# Patient Record
Sex: FEMALE | Race: ASIAN | NOT HISPANIC OR LATINO | Employment: UNEMPLOYED | URBAN - METROPOLITAN AREA
[De-identification: names, ages, dates, MRNs, and addresses within clinical notes are randomized per-mention and may not be internally consistent; named-entity substitution may affect disease eponyms.]

---

## 2018-07-09 ENCOUNTER — LAB (OUTPATIENT)
Dept: LAB | Facility: HOSPITAL | Age: 53
End: 2018-07-09

## 2018-07-09 ENCOUNTER — OFFICE VISIT (OUTPATIENT)
Dept: FAMILY MEDICINE CLINIC | Facility: CLINIC | Age: 53
End: 2018-07-09

## 2018-07-09 VITALS
HEIGHT: 62 IN | WEIGHT: 150 LBS | BODY MASS INDEX: 27.6 KG/M2 | TEMPERATURE: 98.3 F | OXYGEN SATURATION: 99 % | DIASTOLIC BLOOD PRESSURE: 104 MMHG | SYSTOLIC BLOOD PRESSURE: 176 MMHG | HEART RATE: 82 BPM

## 2018-07-09 DIAGNOSIS — J30.89 CHRONIC NON-SEASONAL ALLERGIC RHINITIS, UNSPECIFIED TRIGGER: ICD-10-CM

## 2018-07-09 DIAGNOSIS — N92.6 MENSTRUAL IRREGULARITY: ICD-10-CM

## 2018-07-09 DIAGNOSIS — I10 ESSENTIAL HYPERTENSION: ICD-10-CM

## 2018-07-09 DIAGNOSIS — Z00.00 GENERAL MEDICAL EXAM: Primary | ICD-10-CM

## 2018-07-09 LAB
ANION GAP SERPL CALCULATED.3IONS-SCNC: 9 MMOL/L (ref 3–11)
BASOPHILS # BLD AUTO: 0.04 10*3/MM3 (ref 0–0.2)
BASOPHILS NFR BLD AUTO: 0.5 % (ref 0–1)
BUN BLD-MCNC: 13 MG/DL (ref 9–23)
BUN/CREAT SERPL: 13.1 (ref 7–25)
CALCIUM SPEC-SCNC: 9.2 MG/DL (ref 8.7–10.4)
CHLORIDE SERPL-SCNC: 104 MMOL/L (ref 99–109)
CO2 SERPL-SCNC: 28 MMOL/L (ref 20–31)
CREAT BLD-MCNC: 0.99 MG/DL (ref 0.6–1.3)
DEPRECATED RDW RBC AUTO: 39 FL (ref 37–54)
EOSINOPHIL # BLD AUTO: 0.34 10*3/MM3 (ref 0–0.3)
EOSINOPHIL NFR BLD AUTO: 4.4 % (ref 0–3)
ERYTHROCYTE [DISTWIDTH] IN BLOOD BY AUTOMATED COUNT: 11.9 % (ref 11.3–14.5)
GFR SERPL CREATININE-BSD FRML MDRD: 59 ML/MIN/1.73
GFR SERPL CREATININE-BSD FRML MDRD: 71 ML/MIN/1.73
GLUCOSE BLD-MCNC: 167 MG/DL (ref 70–100)
HCT VFR BLD AUTO: 41.1 % (ref 34.5–44)
HGB BLD-MCNC: 13.5 G/DL (ref 11.5–15.5)
IMM GRANULOCYTES # BLD: 0.01 10*3/MM3 (ref 0–0.03)
IMM GRANULOCYTES NFR BLD: 0.1 % (ref 0–0.6)
IRON 24H UR-MRATE: 72 MCG/DL (ref 50–175)
LYMPHOCYTES # BLD AUTO: 3 10*3/MM3 (ref 0.6–4.8)
LYMPHOCYTES NFR BLD AUTO: 38.8 % (ref 24–44)
MCH RBC QN AUTO: 29.5 PG (ref 27–31)
MCHC RBC AUTO-ENTMCNC: 32.8 G/DL (ref 32–36)
MCV RBC AUTO: 89.7 FL (ref 80–99)
MONOCYTES # BLD AUTO: 0.51 10*3/MM3 (ref 0–1)
MONOCYTES NFR BLD AUTO: 6.6 % (ref 0–12)
NEUTROPHILS # BLD AUTO: 3.84 10*3/MM3 (ref 1.5–8.3)
NEUTROPHILS NFR BLD AUTO: 49.6 % (ref 41–71)
PLATELET # BLD AUTO: 298 10*3/MM3 (ref 150–450)
PMV BLD AUTO: 10.1 FL (ref 6–12)
POTASSIUM BLD-SCNC: 3.8 MMOL/L (ref 3.5–5.5)
RBC # BLD AUTO: 4.58 10*6/MM3 (ref 3.89–5.14)
SODIUM BLD-SCNC: 141 MMOL/L (ref 132–146)
WBC NRBC COR # BLD: 7.74 10*3/MM3 (ref 3.5–10.8)

## 2018-07-09 PROCEDURE — 99386 PREV VISIT NEW AGE 40-64: CPT | Performed by: PHYSICIAN ASSISTANT

## 2018-07-09 PROCEDURE — 83540 ASSAY OF IRON: CPT

## 2018-07-09 PROCEDURE — 85025 COMPLETE CBC W/AUTO DIFF WBC: CPT

## 2018-07-09 PROCEDURE — 80048 BASIC METABOLIC PNL TOTAL CA: CPT

## 2018-07-09 PROCEDURE — 93000 ELECTROCARDIOGRAM COMPLETE: CPT | Performed by: PHYSICIAN ASSISTANT

## 2018-07-09 PROCEDURE — 36415 COLL VENOUS BLD VENIPUNCTURE: CPT

## 2018-07-09 RX ORDER — ATORVASTATIN CALCIUM 20 MG/1
TABLET, FILM COATED ORAL
COMMUNITY
Start: 2018-07-01 | End: 2021-10-23 | Stop reason: HOSPADM

## 2018-07-09 RX ORDER — AMLODIPINE BESYLATE 10 MG/1
10 TABLET ORAL DAILY
Qty: 30 TABLET | Refills: 5 | Status: SHIPPED | OUTPATIENT
Start: 2018-07-09 | End: 2021-10-29 | Stop reason: SDUPTHER

## 2018-07-09 RX ORDER — AMLODIPINE BESYLATE 10 MG/1
TABLET ORAL
COMMUNITY
Start: 2018-06-04 | End: 2018-07-09 | Stop reason: SDUPTHER

## 2018-07-09 RX ORDER — AMOXICILLIN AND CLAVULANATE POTASSIUM 875; 125 MG/1; MG/1
TABLET, FILM COATED ORAL
Refills: 0 | COMMUNITY
Start: 2018-05-08 | End: 2018-07-09

## 2018-07-09 RX ORDER — MEDROXYPROGESTERONE ACETATE 10 MG/1
10 TABLET ORAL DAILY
COMMUNITY
End: 2018-08-13

## 2018-07-09 RX ORDER — AMOXICILLIN 500 MG/1
1000 CAPSULE ORAL 2 TIMES DAILY
COMMUNITY
End: 2018-07-09

## 2018-07-09 RX ORDER — LORATADINE 10 MG/1
10 TABLET ORAL DAILY
Qty: 30 TABLET | Refills: 5 | Status: SHIPPED | OUTPATIENT
Start: 2018-07-09

## 2018-07-09 NOTE — PROGRESS NOTES
Subjective   Shital Guzmán is a 53 y.o. female  Establish Care (New patient establish care ); Hypertension (blood pressure concerns ); and Annual Exam      History of Present Illness  Patient presents today as a new patient to our practice to establish care and for a preventive medical visit.  Patient is here to determine screening labs and tests that are due and to determine immunization status as well.  She states her previous doctor was Dr. Faraz Blanton at [facility].  Patient will be counseled regarding preventative medicine issues such as regular exercise and  healthy diet as well.  Patient states that she ran out of blood pressure medication 1 week ago.    The following portions of the patient's history were reviewed and updated as appropriate: allergies, current medications, past social history and problem list    Review of Systems   Constitutional: Positive for fatigue.   HENT: Positive for congestion ( Clear), postnasal drip, rhinorrhea and sneezing. Negative for sinus pain, sinus pressure, sore throat and voice change.    Eyes: Negative.    Respiratory: Negative.    Cardiovascular: Negative.    Gastrointestinal: Negative.    Endocrine: Negative.    Genitourinary: Positive for menstrual problem ( -Patient states that she has a lot of problems with heavy menstrual bleeding.).   Musculoskeletal: Negative.    Skin: Negative.    Allergic/Immunologic: Negative.    Neurological: Negative.    Hematological: Negative.    Psychiatric/Behavioral: Positive for dysphoric mood ( She states she has felt a little bit low mood lately since losing her job recently but doesn't think she needs medication.  She is not homicidal or suicidal.). Negative for hallucinations, self-injury and suicidal ideas.   All other systems reviewed and are negative.      Objective     Vitals:    07/09/18 1324   BP: (!) 176/104   Pulse: 82   Temp: 98.3 °F (36.8 °C)   SpO2: 99%       Physical Exam   Constitutional: She is oriented to person, place,  and time. She appears well-developed and well-nourished.   HENT:   Head: Normocephalic and atraumatic.   Right Ear: External ear normal.   Left Ear: External ear normal.   Nose: Nose normal.   Mouth/Throat: Oropharynx is clear and moist.   Eyes: Conjunctivae and EOM are normal. Pupils are equal, round, and reactive to light.   Neck: Normal range of motion. Neck supple. No JVD present. Carotid bruit is not present. No thyromegaly present.   Cardiovascular: Normal rate, regular rhythm, normal heart sounds and intact distal pulses.    No murmur heard.  Pulmonary/Chest: Effort normal and breath sounds normal.   Abdominal: Soft. Bowel sounds are normal. She exhibits no mass. There is no tenderness.   Musculoskeletal: Normal range of motion. She exhibits no edema.   Lymphadenopathy:     She has no cervical adenopathy.   Neurological: She is alert and oriented to person, place, and time. She has normal reflexes. No cranial nerve deficit.   Skin: Skin is warm and dry.   Psychiatric: She has a normal mood and affect. Her behavior is normal. Judgment and thought content normal. Her speech is rapid and/or pressured ( Very fast almost continuous speech throughout office visit.). She is not actively hallucinating. Cognition and memory are normal. She is attentive.   Nursing note and vitals reviewed.    ECG 12 Lead  Date/Time: 7/9/2018 4:38 PM  Performed by: CAREY AGRAWAL  Authorized by: CAREY AGRAWAL   Comparison: not compared with previous ECG   Previous ECG: no previous ECG available  Rhythm: sinus rhythm  Rate: normal  BPM: 80  Conduction: conduction normal  ST Segments: ST segments normal  T Waves: T waves normal  QRS axis: normal  Other: no other findings  Clinical impression: normal ECG          Discussed preventative medicine issues with patient including regular exercise, healthy diet, stress reduction, adequate sleep and recommended age-appropriate screening studies.  Assessment/Plan     Diagnoses and all  orders for this visit:    General medical exam    Menstrual irregularity  -     Ambulatory Referral to Gynecology  -     CBC & Differential; Future  -     Iron; Future    Essential hypertension  -     Discontinue: amLODIPine (NORVASC) 10 MG tablet;   -     amLODIPine (NORVASC) 10 MG tablet; Take 1 tablet by mouth Daily.  -     Basic Metabolic Panel; Future  -     ECG 12 Lead    Chronic non-seasonal allergic rhinitis, unspecified trigger  -     loratadine (CLARITIN) 10 MG tablet; Take 1 tablet by mouth Daily.    Other orders  -     atorvastatin (LIPITOR) 20 MG tablet; 1 daily  -     Discontinue: amoxicillin-clavulanate (AUGMENTIN) 875-125 MG per tablet; TAKE 1 TABLET BY MOUTH TWO TIMES A DAY FOR 10 DAYS  -     medroxyPROGESTERone (PROVERA) 10 MG tablet; Take 10 mg by mouth Daily.  -     Discontinue: amoxicillin (AMOXIL) 500 MG capsule; Take 1,000 mg by mouth 2 (Two) Times a Day.      Discussed with patient importance of taking her blood pressure medication daily, she'll follow-up for recheck of blood pressure on medication in 3 weeks.

## 2018-08-10 PROBLEM — Z01.419 WELL WOMAN EXAM WITH ROUTINE GYNECOLOGICAL EXAM: Status: ACTIVE | Noted: 2018-08-10

## 2018-08-13 ENCOUNTER — TELEPHONE (OUTPATIENT)
Dept: FAMILY MEDICINE CLINIC | Facility: CLINIC | Age: 53
End: 2018-08-13

## 2018-08-13 ENCOUNTER — OFFICE VISIT (OUTPATIENT)
Dept: OBSTETRICS AND GYNECOLOGY | Facility: CLINIC | Age: 53
End: 2018-08-13

## 2018-08-13 VITALS
BODY MASS INDEX: 27.98 KG/M2 | DIASTOLIC BLOOD PRESSURE: 90 MMHG | WEIGHT: 153 LBS | RESPIRATION RATE: 14 BRPM | SYSTOLIC BLOOD PRESSURE: 152 MMHG

## 2018-08-13 DIAGNOSIS — Z01.419 WELL WOMAN EXAM WITH ROUTINE GYNECOLOGICAL EXAM: Primary | ICD-10-CM

## 2018-08-13 DIAGNOSIS — N92.1 METRORRHAGIA: ICD-10-CM

## 2018-08-13 PROCEDURE — 99386 PREV VISIT NEW AGE 40-64: CPT | Performed by: OBSTETRICS & GYNECOLOGY

## 2018-08-13 NOTE — TELEPHONE ENCOUNTER
----- Message from Nenita Deng sent at 8/13/2018  4:22 PM EDT -----  Contact: PT  REQUESTS CALL FROM NURSE/MA ABOUT RECENT LABS AND WHETHER SHE NEEDS TO COME IN AND FOLLOW UP OR NOT. [phone]

## 2018-08-13 NOTE — PROGRESS NOTES
Subjective   Chief Complaint   Patient presents with   • Establish Care     Shital Guzmán is a 53 y.o. year old .  Patient's last menstrual period was 2018 (approximate).  She presents to be seen because of troubles with her cycles.  She's had issues with this for many years.  She has a reported history of fibroids.  She last saw somebody at the end of .  Pap smear was done and was normal.  She tells me she's had a history of fibroids diagnosed by ultrasound.  She is uncertain exactly where that was done.  No records are inside the computer for review regarding ultrasounds.    She has been both at [facility] in [place] for a similar problem in the past year he does records not available for review.    Her cycles can be heavy again they are unpredictable.  It is been this way for more than 12 months.  She has had a history of anemia.  She did have recent labs showing her hemoglobin was normal    OTHER THINGS SHE WANTS TO DISCUSS TODAY:  Nothing else    The following portions of the patient's history were reviewed and updated as appropriate:current medications, allergies, past medical history, past social history and past surgical history    Smoking status: Never Smoker                                                                 Smokeless tobacco: Not on file                       Review of Systems  Constitutional POS: nothing reported    NEG: anorexia or night sweats   Genitourinary POS: nothing reported    NEG: dysuria or hematuria   Gastointestinal POS: nothing reported and had colonoscopy in the past 5 year - results are not in record for review    NEG: bloating, change in bowel habits, melena or reflux symptoms   Integument POS: nothing reported    NEG: moles that are changing in size, shape, color or rashes   Breast POS: nothing reported    NEG: persistent breast lump, skin dimpling or nipple discharge         Objective   /90   Resp 14   Wt 69.4 kg (153 lb)   LMP  07/02/2018 (Approximate)   Breastfeeding? No   BMI 27.98 kg/m²     General:  well developed; well nourished  no acute distress   Skin:  No suspicious lesions seen   Thyroid: normal to inspection and palpation   Lungs:  breathing is unlabored   Heart:  Not performed.   Breasts:  Examined in supine position  Symmetric without masses or skin dimpling  Nipples normal without inversion, lesions or discharge  There are no palpable axillary nodes   Abdomen: soft, non-tender; no masses  no umbilical or inginual hernias are present  no hepato-splenomegaly   Pelvis: Clinical staff was present for exam  External genitalia:  normal appearance of the external genitalia including Bartholin's and Murillo's glands.  :  urethral meatus normal;  Vaginal:  normal pink mucosa without prolapse or lesions.  Cervix:  normal appearance.  Uterus:  normal size, shape and consistency.  Adnexa:  non palpable bilaterally.  Rectal:  digital rectal exam not performed; anus visually normal appearing.     Lab Review   No data reviewed    Imaging   No data reviewed        Assessment   1. Normal GYN exam  2. Metrorrhagia - This is a new finding that does need to be worked up further  3. She is up to date on all relevant gynecologic and colorectal screenings except mammography     Plan   1. Pap was done today.  If she does not receive the results of the Pap within 2 weeks  time, she was instructed to call to find out the results.  I explained to Shital that the recommendations for Pap smear interval in a low risk patient's has lengthened to 3 years time.  I encouraged her to be seen yearly for a full physical exam including breast and pelvic exam even during the off years when PAP's will not be performed.  2. She was encouraged to get yearly mammograms.  She should report any palpable breast lump(s) or skin changes regardless of mammographic findings.  I explained to Shital that notification regarding her mammogram results will come from the center  performing the study.  Our office will not be routinely calling with mammogram results.  It is her responsibility to make sure that the results from the mammogram are communicated to her by the breast center.  If she has any questions about the results, she is welcome to call our office anytime. The phone number to schedule a mammogram will be given to Shital today at the time of check out.  I explained that she should be able to call the center directly to schedule her screening mammogram without a physician's order.  So long as she gives them our name, a copy of the mammogram report should be sent to us for review.  3. Ultrasound needs to be done any time that is convenient for her.   4. The importance of keeping all planned follow-up and taking all medications as prescribed was emphasized.  5. Follow up after ultrasound            This note was electronically signed.    Herve Weiss M.D.  August 13, 2018    Note: Speech recognition transcription software may have been used to create portions of this document.  An attempt at proofreading has been made but errors in transcription could still be present.

## 2018-08-15 ENCOUNTER — TELEPHONE (OUTPATIENT)
Dept: OBSTETRICS AND GYNECOLOGY | Facility: CLINIC | Age: 53
End: 2018-08-15

## 2018-08-15 DIAGNOSIS — N95.0 POST-MENOPAUSAL BLEEDING: Primary | ICD-10-CM

## 2018-08-15 NOTE — TELEPHONE ENCOUNTER
Please call Shital and let her know the ultrasound was inconclusive for the reasons why she is bleeding.  I like to get her set up for a saline infusion sonogram to look into this further.  Orders for the saline infusion sonogram have been placed.

## 2018-09-10 ENCOUNTER — OFFICE VISIT (OUTPATIENT)
Dept: OBSTETRICS AND GYNECOLOGY | Facility: CLINIC | Age: 53
End: 2018-09-10

## 2018-09-10 DIAGNOSIS — N92.4 EXCESSIVE BLEEDING IN PREMENOPAUSAL PERIOD: Primary | ICD-10-CM

## 2018-09-10 PROCEDURE — 58340 CATHETER FOR HYSTEROGRAPHY: CPT | Performed by: OBSTETRICS & GYNECOLOGY

## 2018-09-10 NOTE — PROGRESS NOTES
Saline infusion ultrasound     Date of procedure:  September 10, 2018     Risks and benefits discussed? yes  All questions answered? yes  Consents given by the patient  Written consent obtained? yes     Pre-op indication: Menorrhagia     Procedure documentation:  After the patient was identified and informed consent given she was placed in dorsal lithotomy position in stirrups and draped. A sterile speculum was placed inside the vagina with good visualization of the cervix and the cervix was cleaned with Betadine swab ×3. Lidocaine ointment was applied to the anterior lip of the cervix and the cervix was grasped with a single-tooth tenaculum. A balloon catheter was introduced through the cervix without complication and filled with air. The speculum and tenaculum were removed. The uterine cavity was then evaluated with transvaginal ultrasonography. The endometrial stripe was found to be homogenous and approximately 0.6 cm in thickness. There were no signs of submucosal fibroids. The cavity was then filled with approximately 10 mL of normal saline and evaluated on ultrasound. There were signs of pathology (polyp) visualized. The cavity was then drained of saline and the balloon was released and the catheter was removed. No bleeding was noted on the cervical lip and the procedure was completed. The patient tolerated the procedure well and was given follow-up instructions.             Ultrasound Impression: 1.  Possible endometrial polyp          Plan:     1.  Await formal read and evaluation by Dr. Weiss for patient's final plan of care.  Patient voiced understanding and agrees        This note was electronically signed.     Lisandro Sandoval M.D. RAY.  September 10, 2018

## 2018-09-11 ENCOUNTER — TELEPHONE (OUTPATIENT)
Dept: OBSTETRICS AND GYNECOLOGY | Facility: CLINIC | Age: 53
End: 2018-09-11

## 2018-09-11 NOTE — TELEPHONE ENCOUNTER
Called mobile phone in attempt to discuss recent saline infusion sonogram.  Mobile phone ([phone]) with mailbox that was full and could not leave a message.  Then called the home number listed ([phone]).  A gentleman answering the phone and said she no longer had this phone and did not know her current phone number  He would pass a message to her next time he saw her that I had called.  Then called her emergency contact ([phone]) in an effort to reach her.  Left a message on his machine that I was trying to reach his mother about test results and did not have an up-to-date contact phone number with which to reach her

## 2018-09-12 ENCOUNTER — PREP FOR SURGERY (OUTPATIENT)
Dept: OTHER | Facility: HOSPITAL | Age: 53
End: 2018-09-12

## 2018-09-12 ENCOUNTER — TELEPHONE (OUTPATIENT)
Dept: OBSTETRICS AND GYNECOLOGY | Facility: CLINIC | Age: 53
End: 2018-09-12

## 2018-09-12 DIAGNOSIS — N92.1 METRORRHAGIA: Primary | ICD-10-CM

## 2018-09-12 DIAGNOSIS — D25.1 INTRAMURAL AND SUBSEROUS LEIOMYOMA OF UTERUS: ICD-10-CM

## 2018-09-12 DIAGNOSIS — D25.2 INTRAMURAL AND SUBSEROUS LEIOMYOMA OF UTERUS: ICD-10-CM

## 2018-09-12 NOTE — TELEPHONE ENCOUNTER
Called pt to schedule out pt Hysteroscopy surgery and she states she doesn't want the IUD and she wants her uterus removed, confirmed she wanted a Hysterectomy and not hysteroscopy.  She replied Yes.

## 2018-09-12 NOTE — TELEPHONE ENCOUNTER
First step in the process has to be a D&C.  Have to make sure there is no cancer or precancer as the source.

## 2018-09-12 NOTE — TELEPHONE ENCOUNTER
Returned her call and left message that I will try back later today.      Herve Weiss M.D.  September 12, 2018  12:44 PM

## 2018-09-12 NOTE — TELEPHONE ENCOUNTER
She return my call.  Explained that the saline infusion sonogram cannot exclude intracavitary disease such as polyps.  My suspicion is that polyps are not present but I cannot definitively say that.  We need to get her set up for hysteroscopy with D&C with possible Myosure.  Assuming polyps or absent and pathology is all benign, she may be an excellent candidate for either an IUD or a low-dose birth control pill (assuming there are no contraindications)

## 2018-09-16 ENCOUNTER — PREP FOR SURGERY (OUTPATIENT)
Dept: OTHER | Facility: HOSPITAL | Age: 53
End: 2018-09-16

## 2018-09-16 NOTE — H&P
Shital Guzmán  : 1965  MRN: [id number]  [facility]: [id number]    History and Physical    Subjective   Shital Guzmán is a 53 y.o. year old  who present for surgery due to a long-standing history of heavy unpredictable cycles with the saline infusion sonogram with a thickened endometrium and possible endometrial polyp.  Standard transvaginal scanning shows uterine fibroids involving the myometrium and subserosal area.  There is no extension into the cavity.  She had a normal Pap smear in 2018.    Past Medical History:   Diagnosis Date   • Anemia    • Colon polyp    • Essential hypertension    • GERD (gastroesophageal reflux disease)    • H/O gonorrhea 10/2016   • High cholesterol    • Uterine fibroid      Past Surgical History:   Procedure Laterality Date   • LAPAROSCOPIC APPENDECTOMY     • LAPAROSCOPIC TUBAL LIGATION       Obstetric History       T2      L2     SAB0   TAB1   Ectopic0   Molar0   Multiple0   Live Births2       # Outcome Date GA Lbr Todd/2nd Weight Sex Delivery Anes PTL Lv   3 TAB            2 Term     M Vag-Spont   LADI   1 Term     M Vag-Spont   LADI      Obstetric Comments    - Sae    - Pavan     Smoking status: Never Smoker                                                                 Smokeless tobacco: Not on file                         Current Outpatient Prescriptions:   •  amLODIPine (NORVASC) 10 MG tablet, Take 1 tablet by mouth Daily., Disp: 30 tablet, Rfl: 5  •  atorvastatin (LIPITOR) 20 MG tablet, 1 daily, Disp: , Rfl:   •  loratadine (CLARITIN) 10 MG tablet, Take 1 tablet by mouth Daily., Disp: 30 tablet, Rfl: 5    Allergies   Allergen Reactions   • Carvedilol Hives   • Cozaar [Losartan] Hives   • Hydrochlorothiazide Hives   • Hyzaar [Losartan Potassium-Hctz] Hives       Review of Systems      Objective     Vital Signs: See nursing documentation   General: well developed; well nourished  no acute distress   Mental  status: Alert and oriented   Heart: Not performed.   Lungs: breathing is unlabored   Abdomen: soft, non-tender; no masses  no umbilical or inginual hernias are present  no hepato-splenomegaly   Pelvis: Clinical staff was present for exam  External genitalia:  normal appearance of the external genitalia including Bartholin's and Crest's glands.  :  urethral meatus normal;  Vaginal:  normal pink mucosa without prolapse or lesions.  Cervix:  normal appearance.  Uterus:  normal size, shape and consistency.  Adnexa:  normal bimanual exam of the adnexa.  Rectal:  digital rectal exam not performed; anus visually normal appearing.   Labs  Lab Results   Component Value Date     07/09/2018    HGB 13.5 07/09/2018    HCT 41.1 07/09/2018    WBC 7.74 07/09/2018     07/09/2018    K 3.8 07/09/2018     07/09/2018    CO2 28.0 07/09/2018    BUN 13 07/09/2018    CREATININE 0.99 07/09/2018    GLUCOSE 167 (H) 07/09/2018    CALCIUM 9.2 07/09/2018        Assessment   1. Metrorrhagia with thickened endometrium and possible endometrial polypoid disease for saline infusion sonogram  2. Uterine fibroids, intramural and subserosal     Plan   1. Diagnostic hysteroscopy with endometrial sampling and resection of polypoid disease if present via Myosure        Herve Weiss MD       (Pt's PCP is Ela Brown PA-C)

## 2018-09-18 ENCOUNTER — OUTSIDE FACILITY SERVICE (OUTPATIENT)
Dept: OBSTETRICS AND GYNECOLOGY | Facility: CLINIC | Age: 53
End: 2018-09-18

## 2018-09-18 ENCOUNTER — LAB REQUISITION (OUTPATIENT)
Dept: LAB | Facility: HOSPITAL | Age: 53
End: 2018-09-18

## 2018-09-18 DIAGNOSIS — N92.1 EXCESSIVE AND FREQUENT MENSTRUATION WITH IRREGULAR CYCLE: ICD-10-CM

## 2018-09-18 PROCEDURE — 88305 TISSUE EXAM BY PATHOLOGIST: CPT | Performed by: OBSTETRICS & GYNECOLOGY

## 2018-09-18 PROCEDURE — 58558 HYSTEROSCOPY BIOPSY: CPT | Performed by: OBSTETRICS & GYNECOLOGY

## 2018-09-20 LAB
CYTO UR: NORMAL
LAB AP CASE REPORT: NORMAL
LAB AP CLINICAL INFORMATION: NORMAL
LAB AP DIAGNOSIS COMMENT: NORMAL
PATH REPORT.FINAL DX SPEC: NORMAL
PATH REPORT.GROSS SPEC: NORMAL

## 2018-09-24 ENCOUNTER — OFFICE VISIT (OUTPATIENT)
Dept: OBSTETRICS AND GYNECOLOGY | Facility: CLINIC | Age: 53
End: 2018-09-24

## 2018-09-24 VITALS
RESPIRATION RATE: 14 BRPM | DIASTOLIC BLOOD PRESSURE: 82 MMHG | BODY MASS INDEX: 27.25 KG/M2 | WEIGHT: 149 LBS | SYSTOLIC BLOOD PRESSURE: 162 MMHG

## 2018-09-24 DIAGNOSIS — Z98.890 POST-OPERATIVE STATE: Primary | ICD-10-CM

## 2018-09-24 PROBLEM — D25.2 INTRAMURAL AND SUBSEROUS LEIOMYOMA OF UTERUS: Status: ACTIVE | Noted: 2018-01-01

## 2018-09-24 PROBLEM — D25.1 INTRAMURAL AND SUBSEROUS LEIOMYOMA OF UTERUS: Status: ACTIVE | Noted: 2018-01-01

## 2018-09-24 PROCEDURE — 99024 POSTOP FOLLOW-UP VISIT: CPT | Performed by: OBSTETRICS & GYNECOLOGY

## 2018-09-24 NOTE — PROGRESS NOTES
Subjective   Chief Complaint   Patient presents with   • Post-op     Shital Guzmán is a 53 y.o. year old  presenting to be seen for her post-operative visit.  Currently she reports no problems with eating, bowel movements, voiding, or wound drainage and pain is well controlled.  She still would like to have a hysterectomy to solve her problems with bleeding and pain    The pathology results from her procedure are in Shital's record and are benign.      OTHER THINGS SHE WANTS TO DISCUSS TODAY:  Nothing else    The following portions of the patient's history were reviewed and updated as appropriate:current medications and allergies       Objective   /82   Resp 14   Wt 67.6 kg (149 lb)   Breastfeeding? No   BMI 27.25 kg/m²     General:  well developed; well nourished  no acute distress   Abdomen: soft, non-tender; no masses  no umbilical or inginual hernias are present  no hepato-splenomegaly   Pelvis: Clinical staff was present for exam  External genitalia:  normal appearance of the external genitalia including Bartholin's and Shipman's glands.  :  urethral meatus normal;  Vaginal:  normal pink mucosa without prolapse or lesions.  Cervix:  normal appearance.  Uterus:  multiple fibroids are present;  Adnexa:  normal bimanual exam of the adnexa.          Assessment   1. S/P hysteroscopy with biopsy - all benign  2. Intramural and subserosal fibroids - at this point I do not think there is appropriate surgical indication to remove the uterus as per her request  3. Metrorrhagia with dysmenorrhea impacting quality of life - anticipate can be remedied with nonsurgical options     Plan   1. Set up appointment this week for placement of Mirena  2. Product literature shared with patient and bleeding profiles explained  3. The importance of keeping all planned follow-up and taking all medications as prescribed was emphasized.  4. Follow up for IUD placement .         This note was electronically  signed.    Herve Weiss M.D.  September 24, 2018    Note: Speech recognition transcription software may have been used to create portions of this document.  An attempt at proofreading has been made but errors in transcription could still be present.

## 2018-09-26 ENCOUNTER — OFFICE VISIT (OUTPATIENT)
Dept: OBSTETRICS AND GYNECOLOGY | Facility: CLINIC | Age: 53
End: 2018-09-26

## 2018-09-26 VITALS
DIASTOLIC BLOOD PRESSURE: 84 MMHG | WEIGHT: 149 LBS | RESPIRATION RATE: 14 BRPM | SYSTOLIC BLOOD PRESSURE: 156 MMHG | BODY MASS INDEX: 27.25 KG/M2

## 2018-09-26 DIAGNOSIS — N94.6 DYSMENORRHEA: ICD-10-CM

## 2018-09-26 DIAGNOSIS — Z97.5 CONTRACEPTION, DEVICE INTRAUTERINE: Primary | ICD-10-CM

## 2018-09-26 DIAGNOSIS — D25.2 INTRAMURAL AND SUBSEROUS LEIOMYOMA OF UTERUS: ICD-10-CM

## 2018-09-26 DIAGNOSIS — N92.1 METRORRHAGIA: ICD-10-CM

## 2018-09-26 DIAGNOSIS — D25.1 INTRAMURAL AND SUBSEROUS LEIOMYOMA OF UTERUS: ICD-10-CM

## 2018-09-26 PROCEDURE — 58300 INSERT INTRAUTERINE DEVICE: CPT | Performed by: OBSTETRICS & GYNECOLOGY

## 2018-09-26 NOTE — PROGRESS NOTES
IUD Insertion    No LMP recorded.    Date of procedure:  9/26/2018    Risks and benefits discussed? yes  All questions answered? yes  Consents given by The patient  Written consent obtained? yes    Local anesthesia used:  no    Procedure documentation:    After verifying the patient had a low probability of being pregnant and met the criteria for insertion, a sterile speculum has placed and the cervix was cleansed with an antiseptic solution.  Vaginal discharge was scant.  The anterior lip of the cervix was grasped with a tenaculum and the uterine cavity was gently sounded. There was no difficulty passing the sound through the cervix.  Cervical dilation did not need to be performed prior to placing the IUD.  The uterus was anteverted and sounded to 8 cms.  The Mirena was then prepared per the manufacturers instructions.    The Mirena was advanced to a point 2 cms from the fundus and then the arms were released from the sheath.  The device was advanced to the fundus and the device was released fully from the sheath. The string was cut 2 cms in length.  Bleeding from the cervix was scant.    She tolerated the procedure without any difficulty.     Post procedure instructions: It was reviewed that the Mirena will not alter the timing of when she bleeds but it may decrease the quantity of flow and cramps.  Roughly 30% of people will be amenorrheic over time.  Efficacy rate of 99.2% over 5 years was discussed.  Spontaneous expulsion rate of 1-2% was also discussed.  If she has any issue with fever or excessive bleeding or pain she is to call the office immediately.  Otherwise I would like to see her back in 3 months to check bleeding and pain    This note was electronically signed.    Herve Weiss M.D.  September 26, 2018

## 2019-01-09 ENCOUNTER — OFFICE VISIT (OUTPATIENT)
Dept: OBSTETRICS AND GYNECOLOGY | Facility: CLINIC | Age: 54
End: 2019-01-09

## 2019-01-09 VITALS — RESPIRATION RATE: 14 BRPM | WEIGHT: 160 LBS | BODY MASS INDEX: 29.26 KG/M2

## 2019-01-09 DIAGNOSIS — D25.2 INTRAMURAL AND SUBSEROUS LEIOMYOMA OF UTERUS: Primary | ICD-10-CM

## 2019-01-09 DIAGNOSIS — Z30.431 CHECKING OF INTRAUTERINE DEVICE: ICD-10-CM

## 2019-01-09 DIAGNOSIS — N76.0 BACTERIAL VAGINOSIS: ICD-10-CM

## 2019-01-09 DIAGNOSIS — B96.89 BACTERIAL VAGINOSIS: ICD-10-CM

## 2019-01-09 DIAGNOSIS — D25.1 INTRAMURAL AND SUBSEROUS LEIOMYOMA OF UTERUS: Primary | ICD-10-CM

## 2019-01-09 PROCEDURE — 99213 OFFICE O/P EST LOW 20 MIN: CPT | Performed by: OBSTETRICS & GYNECOLOGY

## 2019-01-09 RX ORDER — METRONIDAZOLE 500 MG/1
500 TABLET ORAL 2 TIMES DAILY
Qty: 14 TABLET | Refills: 0 | Status: SHIPPED | OUTPATIENT
Start: 2019-01-09 | End: 2019-01-16

## 2019-01-09 NOTE — PROGRESS NOTES
Subjective   Chief Complaint   Patient presents with   • Imaging Only     Shital Guzmán is a 53 y.o. year old  presenting to be seen for follow-up of her recently placed Mirena.  Overall she's very happy.  The heavy unpredictable and painful bleeding is now gone.  The only issue she has is like discharge with foul odor.  She's had this in the past and used antibiotics to clear.    OTHER THINGS SHE WANTS TO DISCUSS TODAY:  Nothing else       Objective   Resp 14   Wt 72.6 kg (160 lb)   Breastfeeding? No   BMI 29.26 kg/m²     Imaging   No data reviewed       Assessment   1. Metrorrhagia with dysmenorrhea with associated intramural uterine fibroids.  Significantly improved with Mirena  2. Abnormal discharge most consistent with bacterial vaginosis from abnormal bleeding     Plan   1. Empiric use of Flagyl  2. The importance of keeping all planned follow-up and taking all medications as prescribed was emphasized.  3. Follow up for annual exam 2019     New Medications Ordered This Visit   Medications   • metroNIDAZOLE (FLAGYL) 500 MG tablet     Sig: Take 1 tablet by mouth 2 (Two) Times a Day for 7 days.     Dispense:  14 tablet     Refill:  0          Total time spent today with Shital  was 20 minutes (level 3).  Greater than 50% of the time was spent coordinating care, answering her questions and counseling regarding pathophysiology of her presenting problem along with plans for any diagnositc work-up and treatment.    This note was electronically signed.    Herve Weiss M.D.  2019    Note: Speech recognition transcription software may have been used to create portions of this document.  An attempt at proofreading has been made but errors in transcription could still be present.

## 2019-12-09 NOTE — TELEPHONE ENCOUNTER
Continued Stay Note  AMAYA Pittman     Patient Name: Yuriy Bennett  MRN: [id number]  Today's Date: 12/9/2019    Admit Date: 12/1/2019    Discharge Plan     Row Name 12/09/19 1621       Plan    Plan Comments   contacted [facility] to inquire again as to bed availability. They are full and have discharged today or 12/10.        Discharge Codes    No documentation.       Expected Discharge Date and Time     Expected Discharge Date Expected Discharge Time    Dec 9, 2019         SAILAJA Moreno    Phone # [phone]  Cell #[phone]  Fax#[phone]  [email or web address]      SAILAJA Moreno     Patient was verbally notified of lab letter

## 2020-10-02 ENCOUNTER — TELEPHONE (OUTPATIENT)
Dept: OBSTETRICS AND GYNECOLOGY | Facility: CLINIC | Age: 55
End: 2020-10-02

## 2021-01-12 ENCOUNTER — TELEPHONE (OUTPATIENT)
Dept: OBSTETRICS AND GYNECOLOGY | Facility: CLINIC | Age: 56
End: 2021-01-12

## 2021-01-12 NOTE — TELEPHONE ENCOUNTER
[facility] PT CALLED COMPLAINING OF VAGINAL ITCHING AND ODOR.  SHE SCHEDULED AN APPT ON JAN. 21.  FOR SYMPTOMS AND IUD REMOVAL.

## 2021-01-12 NOTE — TELEPHONE ENCOUNTER
Pt was advised she needed to be seen in office, pt is schedule to be seen but was advised she needed to be seen sooner call was foward to the front to schedule pt

## 2021-02-26 ENCOUNTER — LAB (OUTPATIENT)
Dept: LAB | Facility: HOSPITAL | Age: 56
End: 2021-02-26

## 2021-02-26 ENCOUNTER — OFFICE VISIT (OUTPATIENT)
Dept: OBSTETRICS AND GYNECOLOGY | Facility: CLINIC | Age: 56
End: 2021-02-26

## 2021-02-26 VITALS
RESPIRATION RATE: 14 BRPM | SYSTOLIC BLOOD PRESSURE: 138 MMHG | DIASTOLIC BLOOD PRESSURE: 84 MMHG | WEIGHT: 176 LBS | BODY MASS INDEX: 32.19 KG/M2

## 2021-02-26 DIAGNOSIS — Z11.8 SCREENING FOR CHLAMYDIAL DISEASE: ICD-10-CM

## 2021-02-26 DIAGNOSIS — L29.2 VULVAR PRURITUS: Primary | ICD-10-CM

## 2021-02-26 DIAGNOSIS — N91.2 AMENORRHEA: ICD-10-CM

## 2021-02-26 LAB — FSH SERPL-ACNC: 40.7 MIU/ML

## 2021-02-26 PROCEDURE — 83001 ASSAY OF GONADOTROPIN (FSH): CPT

## 2021-02-26 PROCEDURE — 99214 OFFICE O/P EST MOD 30 MIN: CPT | Performed by: OBSTETRICS & GYNECOLOGY

## 2021-02-26 NOTE — PROGRESS NOTES
Subjective   Chief Complaint   Patient presents with   • Contraception     remove iud     Shital Guzmán is a 55 y.o. year old .  She presents to be seen because of concerns that she may need to have her IUD removed.  The main reason she wants it out is proceeded been gaining weight.  Also has some concerns about infection.  She is having itching and odor at times.  Other than over-the-counter Monistat which she used 2 days ago she is really tried nothing else.  Typically she has a white discharge.  Itching is mostly external.  She does use cleaning products that do have scents.      She is sexually active and has had new partners.    OTHER THINGS SHE WANTS TO DISCUSS TODAY:  Nothing else    The following portions of the patient's history were reviewed and updated as appropriate:current medications and allergies    Social History    Tobacco Use      Smoking status: Never Smoker    Review of Systems  Constitutional POS: nothing reported    NEG: anorexia or night sweats   Genitourinary POS: nothing reported    NEG: dysuria or hematuria   Gastointestinal POS: nothing reported    NEG: bloating, change in bowel habits, melena or reflux symptoms   Integument POS: see HPI    NEG: moles that are changing in size, shape, color or rashes   Breast POS: nothing reported    NEG: persistent breast lump, skin dimpling or nipple discharge         Objective   /84   Resp 14   Wt 79.8 kg (176 lb)   Breastfeeding No   BMI 32.19 kg/m²     General:  well developed; well nourished  no acute distress   Pelvis: Clinical staff was present for exam  External genitalia:  normal appearance of the external genitalia including Bartholin's and [place]'s glands. Skin of the vulva is diffusely thickened and hyperkeratotic  :  urethral meatus normal;  Vaginal:  normal pink mucosa without prolapse or lesions.  Cervix:  normal appearance. IUD string present - 1 cms in length;     Lab Review   No data reviewed    Imaging   No data  reviewed        Assessment   1. Subjective weight gain.  Clearly not associated with her IUD use  2. Historical metrorrhagia currently well controlled with Mirena  3. Vulvar itching most consistent with atopic dermatitis/contact dermatitis     Plan   1. The following tests were ordered today: FSH and STD swabs for GC, chlamydia and trichimoniasis.  It was explained to Shital that all lab test should be back within the one week after they are performed. She will be notified about the results, regardless of the findings. If she has not been contacted by the office within 2 weeks after the test has been performed, it is her responsibility to contact us to learn about her results.  2. I explained to Shital that vulvar itching often is due to a contact dermatitis.  The source of this often results from products that directly contact the vulvar skin or residues from cleaning products used to launder clothing.  Any product that has a fragrance or oil the directly contacts the skin or comes in contact with the skin through clothing can be the inciting factor.  I encouraged her to examine all products used for cleansing and to move to hypoallergenic, unscented products.  If this fails to control her symptoms, additional testing for things such as food allergies may be warranted.  3. Scratch/itch cycle discussed.  Use of ice to help relieve itching also discussed.  4. The importance of keeping all planned follow-up and taking all medications as prescribed was emphasized.  5. Follow up pending FSH level     New Medications Ordered This Visit   Medications   • triamcinolone (KENALOG) 0.1 % ointment     Sig: Used twice daily for 2 weeks and then taper to once daily for 2 weeks.  If itching remains well controlled using every other day as needed.     Dispense:  80 g     Refill:  1     Please provide the 80 gm tube          This note was electronically signed.    Herve Weiss M.D.  February 26, 2021    Note: Speech  recognition transcription software may have been used to create portions of this document.  An attempt at proofreading has been made but errors in transcription could still be present.

## 2021-10-21 ENCOUNTER — APPOINTMENT (OUTPATIENT)
Dept: MRI IMAGING | Facility: HOSPITAL | Age: 56
End: 2021-10-21

## 2021-10-21 ENCOUNTER — APPOINTMENT (OUTPATIENT)
Dept: GENERAL RADIOLOGY | Facility: HOSPITAL | Age: 56
End: 2021-10-21

## 2021-10-21 ENCOUNTER — HOSPITAL ENCOUNTER (INPATIENT)
Facility: HOSPITAL | Age: 56
LOS: 2 days | Discharge: HOME | End: 2021-10-23
Attending: EMERGENCY MEDICINE | Admitting: INTERNAL MEDICINE

## 2021-10-21 DIAGNOSIS — R20.0 LEFT SIDED NUMBNESS: ICD-10-CM

## 2021-10-21 DIAGNOSIS — R73.9 HYPERGLYCEMIA: ICD-10-CM

## 2021-10-21 DIAGNOSIS — I63.9 ACUTE STROKE DUE TO ISCHEMIA (HCC): Primary | ICD-10-CM

## 2021-10-21 DIAGNOSIS — I10 ESSENTIAL HYPERTENSION: ICD-10-CM

## 2021-10-21 LAB
ALBUMIN SERPL-MCNC: 4.3 G/DL (ref 3.5–5.2)
ALBUMIN/GLOB SERPL: 1.4 G/DL
ALP SERPL-CCNC: 87 U/L (ref 39–117)
ALT SERPL W P-5'-P-CCNC: 54 U/L (ref 1–33)
ANION GAP SERPL CALCULATED.3IONS-SCNC: 12 MMOL/L (ref 5–15)
AST SERPL-CCNC: 27 U/L (ref 1–32)
BACTERIA UR QL AUTO: ABNORMAL /HPF
BASOPHILS # BLD AUTO: 0.06 10*3/MM3 (ref 0–0.2)
BASOPHILS NFR BLD AUTO: 0.7 % (ref 0–1.5)
BILIRUB SERPL-MCNC: 0.4 MG/DL (ref 0–1.2)
BILIRUB UR QL STRIP: NEGATIVE
BUN SERPL-MCNC: 15 MG/DL (ref 6–20)
BUN/CREAT SERPL: 17.4 (ref 7–25)
CALCIUM SPEC-SCNC: 9.8 MG/DL (ref 8.6–10.5)
CHLORIDE SERPL-SCNC: 100 MMOL/L (ref 98–107)
CLARITY UR: CLEAR
CO2 SERPL-SCNC: 24 MMOL/L (ref 22–29)
COLOR UR: YELLOW
CREAT SERPL-MCNC: 0.86 MG/DL (ref 0.57–1)
DEPRECATED RDW RBC AUTO: 38.1 FL (ref 37–54)
EOSINOPHIL # BLD AUTO: 0.18 10*3/MM3 (ref 0–0.4)
EOSINOPHIL NFR BLD AUTO: 2 % (ref 0.3–6.2)
ERYTHROCYTE [DISTWIDTH] IN BLOOD BY AUTOMATED COUNT: 11.4 % (ref 12.3–15.4)
GFR SERPL CREATININE-BSD FRML MDRD: 68 ML/MIN/1.73
GFR SERPL CREATININE-BSD FRML MDRD: 83 ML/MIN/1.73
GLOBULIN UR ELPH-MCNC: 3 GM/DL
GLUCOSE BLDC GLUCOMTR-MCNC: 203 MG/DL (ref 70–130)
GLUCOSE BLDC GLUCOMTR-MCNC: 368 MG/DL (ref 70–130)
GLUCOSE SERPL-MCNC: 435 MG/DL (ref 65–99)
GLUCOSE UR STRIP-MCNC: ABNORMAL MG/DL
HCT VFR BLD AUTO: 46.2 % (ref 34–46.6)
HGB BLD-MCNC: 15.2 G/DL (ref 12–15.9)
HGB UR QL STRIP.AUTO: ABNORMAL
HYALINE CASTS UR QL AUTO: ABNORMAL /LPF
IMM GRANULOCYTES # BLD AUTO: 0.02 10*3/MM3 (ref 0–0.05)
IMM GRANULOCYTES NFR BLD AUTO: 0.2 % (ref 0–0.5)
KETONES UR QL STRIP: NEGATIVE
LEUKOCYTE ESTERASE UR QL STRIP.AUTO: NEGATIVE
LYMPHOCYTES # BLD AUTO: 3.08 10*3/MM3 (ref 0.7–3.1)
LYMPHOCYTES NFR BLD AUTO: 34.8 % (ref 19.6–45.3)
MCH RBC QN AUTO: 29.7 PG (ref 26.6–33)
MCHC RBC AUTO-ENTMCNC: 32.9 G/DL (ref 31.5–35.7)
MCV RBC AUTO: 90.4 FL (ref 79–97)
MONOCYTES # BLD AUTO: 0.49 10*3/MM3 (ref 0.1–0.9)
MONOCYTES NFR BLD AUTO: 5.5 % (ref 5–12)
NEUTROPHILS NFR BLD AUTO: 5.02 10*3/MM3 (ref 1.7–7)
NEUTROPHILS NFR BLD AUTO: 56.8 % (ref 42.7–76)
NITRITE UR QL STRIP: NEGATIVE
NRBC BLD AUTO-RTO: 0 /100 WBC (ref 0–0.2)
PH UR STRIP.AUTO: <=5 [PH] (ref 5–8)
PLATELET # BLD AUTO: 290 10*3/MM3 (ref 140–450)
PMV BLD AUTO: 10.6 FL (ref 6–12)
POTASSIUM SERPL-SCNC: 4.3 MMOL/L (ref 3.5–5.2)
PROT SERPL-MCNC: 7.3 G/DL (ref 6–8.5)
PROT UR QL STRIP: NEGATIVE
RBC # BLD AUTO: 5.11 10*6/MM3 (ref 3.77–5.28)
RBC # UR: ABNORMAL /HPF
REF LAB TEST METHOD: ABNORMAL
SODIUM SERPL-SCNC: 136 MMOL/L (ref 136–145)
SP GR UR STRIP: 1.03 (ref 1–1.03)
SQUAMOUS #/AREA URNS HPF: ABNORMAL /HPF
TROPONIN T SERPL-MCNC: <0.01 NG/ML (ref 0–0.03)
UROBILINOGEN UR QL STRIP: ABNORMAL
WBC # BLD AUTO: 8.85 10*3/MM3 (ref 3.4–10.8)
WBC UR QL AUTO: ABNORMAL /HPF

## 2021-10-21 PROCEDURE — 80053 COMPREHEN METABOLIC PANEL: CPT | Performed by: EMERGENCY MEDICINE

## 2021-10-21 PROCEDURE — 93005 ELECTROCARDIOGRAM TRACING: CPT | Performed by: EMERGENCY MEDICINE

## 2021-10-21 PROCEDURE — 0 GADOBENATE DIMEGLUMINE 529 MG/ML SOLUTION: Performed by: EMERGENCY MEDICINE

## 2021-10-21 PROCEDURE — U0004 COV-19 TEST NON-CDC HGH THRU: HCPCS | Performed by: INTERNAL MEDICINE

## 2021-10-21 PROCEDURE — 36415 COLL VENOUS BLD VENIPUNCTURE: CPT

## 2021-10-21 PROCEDURE — 70553 MRI BRAIN STEM W/O & W/DYE: CPT

## 2021-10-21 PROCEDURE — 81001 URINALYSIS AUTO W/SCOPE: CPT | Performed by: INTERNAL MEDICINE

## 2021-10-21 PROCEDURE — 99284 EMERGENCY DEPT VISIT MOD MDM: CPT

## 2021-10-21 PROCEDURE — 85025 COMPLETE CBC W/AUTO DIFF WBC: CPT | Performed by: EMERGENCY MEDICINE

## 2021-10-21 PROCEDURE — 99222 1ST HOSP IP/OBS MODERATE 55: CPT | Performed by: NURSE PRACTITIONER

## 2021-10-21 PROCEDURE — 84484 ASSAY OF TROPONIN QUANT: CPT | Performed by: EMERGENCY MEDICINE

## 2021-10-21 PROCEDURE — A9577 INJ MULTIHANCE: HCPCS | Performed by: EMERGENCY MEDICINE

## 2021-10-21 PROCEDURE — 71045 X-RAY EXAM CHEST 1 VIEW: CPT

## 2021-10-21 PROCEDURE — 82962 GLUCOSE BLOOD TEST: CPT

## 2021-10-21 PROCEDURE — 99223 1ST HOSP IP/OBS HIGH 75: CPT | Performed by: INTERNAL MEDICINE

## 2021-10-21 PROCEDURE — 87086 URINE CULTURE/COLONY COUNT: CPT | Performed by: INTERNAL MEDICINE

## 2021-10-21 RX ORDER — ASPIRIN 81 MG/1
81 TABLET, CHEWABLE ORAL DAILY
Status: DISCONTINUED | OUTPATIENT
Start: 2021-10-21 | End: 2021-10-23 | Stop reason: HOSPADM

## 2021-10-21 RX ORDER — ASPIRIN 300 MG/1
300 SUPPOSITORY RECTAL DAILY
Status: DISCONTINUED | OUTPATIENT
Start: 2021-10-21 | End: 2021-10-23 | Stop reason: HOSPADM

## 2021-10-21 RX ORDER — SODIUM CHLORIDE 0.9 % (FLUSH) 0.9 %
10 SYRINGE (ML) INJECTION EVERY 12 HOURS SCHEDULED
Status: DISCONTINUED | OUTPATIENT
Start: 2021-10-21 | End: 2021-10-23 | Stop reason: HOSPADM

## 2021-10-21 RX ORDER — SODIUM CHLORIDE 0.9 % (FLUSH) 0.9 %
10 SYRINGE (ML) INJECTION AS NEEDED
Status: DISCONTINUED | OUTPATIENT
Start: 2021-10-21 | End: 2021-10-23 | Stop reason: HOSPADM

## 2021-10-21 RX ORDER — ONDANSETRON 2 MG/ML
4 INJECTION INTRAMUSCULAR; INTRAVENOUS EVERY 6 HOURS PRN
Status: DISCONTINUED | OUTPATIENT
Start: 2021-10-21 | End: 2021-10-23 | Stop reason: HOSPADM

## 2021-10-21 RX ORDER — ATORVASTATIN CALCIUM 40 MG/1
80 TABLET, FILM COATED ORAL NIGHTLY
Status: DISCONTINUED | OUTPATIENT
Start: 2021-10-21 | End: 2021-10-23 | Stop reason: HOSPADM

## 2021-10-21 RX ORDER — CLOPIDOGREL BISULFATE 75 MG/1
75 TABLET ORAL DAILY
Status: DISCONTINUED | OUTPATIENT
Start: 2021-10-22 | End: 2021-10-23 | Stop reason: HOSPADM

## 2021-10-21 RX ORDER — BISACODYL 10 MG
10 SUPPOSITORY, RECTAL RECTAL DAILY PRN
Status: DISCONTINUED | OUTPATIENT
Start: 2021-10-21 | End: 2021-10-23 | Stop reason: HOSPADM

## 2021-10-21 RX ORDER — POLYETHYLENE GLYCOL 3350 17 G/17G
17 POWDER, FOR SOLUTION ORAL DAILY PRN
Status: DISCONTINUED | OUTPATIENT
Start: 2021-10-21 | End: 2021-10-23 | Stop reason: HOSPADM

## 2021-10-21 RX ORDER — AMOXICILLIN 250 MG
2 CAPSULE ORAL 2 TIMES DAILY
Status: DISCONTINUED | OUTPATIENT
Start: 2021-10-21 | End: 2021-10-23 | Stop reason: HOSPADM

## 2021-10-21 RX ORDER — CLOPIDOGREL BISULFATE 75 MG/1
300 TABLET ORAL ONCE
Status: COMPLETED | OUTPATIENT
Start: 2021-10-21 | End: 2021-10-21

## 2021-10-21 RX ORDER — BISACODYL 5 MG/1
5 TABLET, DELAYED RELEASE ORAL DAILY PRN
Status: DISCONTINUED | OUTPATIENT
Start: 2021-10-21 | End: 2021-10-23 | Stop reason: HOSPADM

## 2021-10-21 RX ADMIN — ATORVASTATIN CALCIUM 80 MG: 40 TABLET, FILM COATED ORAL at 21:04

## 2021-10-21 RX ADMIN — GADOBENATE DIMEGLUMINE 15 ML: 529 INJECTION, SOLUTION INTRAVENOUS at 14:18

## 2021-10-21 RX ADMIN — DOCUSATE SODIUM 50 MG AND SENNOSIDES 8.6 MG 2 TABLET: 8.6; 5 TABLET, FILM COATED ORAL at 21:04

## 2021-10-21 RX ADMIN — ASPIRIN 81 MG CHEWABLE TABLET 81 MG: 81 TABLET CHEWABLE at 18:30

## 2021-10-21 RX ADMIN — CLOPIDOGREL BISULFATE 300 MG: 75 TABLET ORAL at 18:30

## 2021-10-21 RX ADMIN — SODIUM CHLORIDE, PRESERVATIVE FREE 10 ML: 5 INJECTION INTRAVENOUS at 21:04

## 2021-10-21 NOTE — ED PROVIDER NOTES
Subjective   56-year-old female presents for evaluation of paresthesias in her left face, left chest, left arm, and left leg.  The patient reports that she began developing the symptoms yesterday, approximately 24 hours prior to arrival.  They have continued to persist since that given time and were more notable this morning when she awoke from sleep.  She denies observing a focal weakness to her arm or legs, but states that because she could not feel her left leg well when she stepped on it she felt as if it almost gave way on her.  But she does not report weakness to the left leg.  She denies chest pain just tingling to the left chest.  No abdominal pain, no nausea or vomiting, no change in bowel or urinary function she has not been present diagnosed with COVID-19 and has been vaccinated against COVID-19.  She denies any acute infectious or respiratory symptoms.  No reported change in bowel or urinary function.  She does report a previous history of diabetes in the past, but states that she does not take medication for diabetes at this given time.  She also reports no history of hypertension and does not currently take any medication for high blood pressure.  She discontinued all these medications greater than 2 months ago.          Review of Systems   Constitutional: Negative for chills, fatigue and fever.   HENT: Negative for congestion, ear pain, postnasal drip, sinus pressure and sore throat.    Eyes: Negative for pain, redness and visual disturbance.   Respiratory: Negative for cough, chest tightness and shortness of breath.    Cardiovascular: Negative for chest pain, palpitations and leg swelling.   Gastrointestinal: Negative for abdominal pain, anal bleeding, blood in stool, diarrhea, nausea and vomiting.   Endocrine: Negative for polydipsia and polyuria.   Genitourinary: Negative for difficulty urinating, dysuria, frequency and urgency.   Musculoskeletal: Negative for arthralgias, back pain and neck pain.    Skin: Negative for pallor and rash.   Allergic/Immunologic: Negative for environmental allergies and immunocompromised state.   Neurological: Negative for dizziness, weakness and headaches.        Paresthesias left face/arm/chest/leg   Hematological: Negative for adenopathy.   Psychiatric/Behavioral: Negative for confusion, self-injury and suicidal ideas. The patient is not nervous/anxious.    All other systems reviewed and are negative.      Past Medical History:   Diagnosis Date   • Anemia    • Colon polyp 2014   • Essential hypertension 2011   • GERD (gastroesophageal reflux disease)    • H/O gonorrhea 10/2016   • High cholesterol 2015   • Intramural and subserous leiomyoma of uterus 2018   • Uterine fibroid 2008       Allergies   Allergen Reactions   • Carvedilol Hives   • Cozaar [Losartan] Hives   • Hydrochlorothiazide Hives   • Hyzaar [Losartan Potassium-Hctz] Hives       Past Surgical History:   Procedure Laterality Date   • D & C HYSTEROSCOPY  09/18/2018    path was benign   • LAPAROSCOPIC APPENDECTOMY  2018   • LAPAROSCOPIC TUBAL LIGATION  1988       Family History   Problem Relation Age of Onset   • Cancer Mother    • Ovarian cancer Mother        Social History     Socioeconomic History   • Marital status: Single   Tobacco Use   • Smoking status: Never Smoker   • Smokeless tobacco: Never Used   Vaping Use   • Vaping Use: Never used   Substance and Sexual Activity   • Alcohol use: No   • Drug use: Never   • Sexual activity: Defer           Objective   Physical Exam  Vitals and nursing note reviewed.   Constitutional:       General: She is not in acute distress.     Appearance: Normal appearance. She is well-developed. She is not toxic-appearing or diaphoretic.   HENT:      Head: Normocephalic and atraumatic.      Right Ear: External ear normal.      Left Ear: External ear normal.      Nose: Nose normal.   Eyes:      General: Lids are normal.      Pupils: Pupils are equal, round, and reactive to light.    Neck:      Trachea: No tracheal deviation.   Cardiovascular:      Rate and Rhythm: Normal rate and regular rhythm.      Pulses: No decreased pulses.      Heart sounds: Normal heart sounds. No murmur heard.  No friction rub. No gallop.    Pulmonary:      Effort: Pulmonary effort is normal. No respiratory distress.      Breath sounds: Normal breath sounds. No decreased breath sounds, wheezing, rhonchi or rales.   Abdominal:      General: Bowel sounds are normal.      Palpations: Abdomen is soft.      Tenderness: There is no abdominal tenderness. There is no guarding or rebound.   Musculoskeletal:         General: No deformity. Normal range of motion.      Cervical back: Normal range of motion and neck supple.   Lymphadenopathy:      Cervical: No cervical adenopathy.   Skin:     General: Skin is warm and dry.      Findings: No rash.   Neurological:      Mental Status: She is alert and oriented to person, place, and time.      GCS: GCS eye subscore is 4. GCS verbal subscore is 5. GCS motor subscore is 6.      Cranial Nerves: No cranial nerve deficit.      Sensory: No sensory deficit.      Comments: The patient reports subjectively decrease in station of left face, left arm, and left leg.  No weakness throughout the left side.    NIH stroke scale 1   Psychiatric:         Speech: Speech normal.         Behavior: Behavior normal.         Thought Content: Thought content normal.         Judgment: Judgment normal.         Procedures           ED Course                                           MDM  Number of Diagnoses or Management Options  Acute stroke due to ischemia (HCC): new and requires workup  Essential hypertension: new and requires workup  Hyperglycemia: new and requires workup  Left sided numbness: new and requires workup  Diagnosis management comments: NIH Stroke Scale/Score (NIHSS) - MDCalc  Calculated on Oct 21 2021 1:05 PM  1 points -> NIH Stroke Scale    MRI the brain shows scattered acute infarcts in the  right thalamus.  I discussed the patient with the stroke navigator who will evaluate it.    I will discussed the patient with the hospitalist for admission.    Based off last known well time the patient is not a TPA candidate.  Based off NIH stroke scale of 1 the patient is not an interventional candidate.       Amount and/or Complexity of Data Reviewed  Clinical lab tests: ordered and reviewed  Tests in the radiology section of CPT®: ordered and reviewed  Review and summarize past medical records: yes  Discuss the patient with other providers: yes  Independent visualization of images, tracings, or specimens: yes        Final diagnoses:   Acute stroke due to ischemia (HCC)   Left sided numbness   Hyperglycemia   Essential hypertension       ED Disposition  ED Disposition     ED Disposition Condition Comment    Decision to Admit            No follow-up provider specified.       Medication List      No changes were made to your prescriptions during this visit.          Mayuri Kelly MD  10/21/21 1527

## 2021-10-21 NOTE — CONSULTS
Stroke Consult Note    Patient Name: Shital Guzmán   MRN: [id number]  Age: 56 y.o.  Sex: female  : 1965    Primary Care Physician: Ela Brown PA-C  Referring Physician:  No ref. provider found    TIME STROKE TEAM CALLED: 1520 EST     TIME PATIENT SEEN: 1525 EST    Handedness: right  Race:      Chief Complaint/Reason for Consultation: left side paresthesias    HPI:   Shital Guzmán is a 56-year-old female with known medical diagnoses of hypertension, hyperlipidemia, diabetes, medication noncompliance and cocaine abuse.  She presents to [facility] ED today with concerns for left sided paresthesias of the face arm and leg.  This began sometime yesterday she cannot pinpoint the exact time.  Of note patient reports she has not been taking her medications for the last year.  She reports she felt that they were unnecessary.  On arrival she was triaged by the ED doctor and due to the extended window of her symptoms MRI was chosen as initial imaging.  It revealed scattered small acute infarcts in the right thalamus and right corona radiata with chronic right basal ganglia lacunar infarct.  NIH 1.  MRA head and neck has been ordered to evaluate vessel status.  Presenting blood pressure 176/105.  She does not take aspirin, Plavix or blood thinners.  She denies alcohol or tobacco abuse but does report using cocaine occasionally.  Last use of cocaine was approximately 2 days ago.    Last Known Normal Date/Time: Yesterday    Review of Systems   Constitutional: Negative for chills, diaphoresis, fatigue and fever.   Eyes: Negative for photophobia and visual disturbance.   Respiratory: Negative for cough and shortness of breath.    Cardiovascular: Negative for chest pain and palpitations.   Gastrointestinal: Negative for abdominal pain, nausea and vomiting.   Musculoskeletal: Negative for gait problem.   Neurological: Positive for numbness.   Psychiatric/Behavioral: Negative for confusion.   All other systems reviewed  and are negative.       Temp:  [97.8 °F (36.6 °C)] 97.8 °F (36.6 °C)  Heart Rate:  [89-99] 95  Resp:  [15-16] 16  BP: (146-176)/() 158/104    Neurological Exam  Mental Status  Alert. Oriented to person, place, time and situation. Speech is normal. Language is fluent with no aphasia. Fund of knowledge is appropriate for level of education.    Cranial Nerves  CN II: Visual acuity is normal. Visual fields full to confrontation.  CN III, IV, VI: Extraocular movements intact bilaterally. Normal lids and orbits bilaterally. Pupils equal round and reactive to light bilaterally.  CN V:  Right: Facial sensation is normal.  Left: Diminished sensation of the entire left side of the face.  CN VII: Full and symmetric facial movement.  CN XI: Shoulder shrug strength is normal.  CN XII: Tongue midline without atrophy or fasciculations.    Motor   Strength is 5/5 throughout all four extremities.    Sensory  Light touch abnormality: Diminished sensation to left face, arm and leg.     Coordination  Finger-to-nose, rapid alternating movements and heel-to-shin normal bilaterally without dysmetria.    Gait  Not assessed .      Physical Exam  Vitals and nursing note reviewed.   Constitutional:       General: She is not in acute distress.     Appearance: Normal appearance.   HENT:      Head: Normocephalic and atraumatic.   Eyes:      General: Lids are normal.      Extraocular Movements: Extraocular movements intact.      Pupils: Pupils are equal, round, and reactive to light.   Cardiovascular:      Rate and Rhythm: Normal rate.   Pulmonary:      Effort: Pulmonary effort is normal. No respiratory distress.   Skin:     General: Skin is warm and dry.   Neurological:      Mental Status: She is alert.      Coordination: Coordination is intact.      Deep Tendon Reflexes: Strength normal.   Psychiatric:         Mood and Affect: Mood normal.         Speech: Speech normal.         Behavior: Behavior normal.         Acute Stroke  Data    Alteplase (tPA) Inclusion / Exclusion Criteria    Time: 15:39 EDT  Person Administering Scale: ELDON Orona    Inclusion Criteria  []   18 years of age or greater   []   Onset of symptoms < 4.5 hours before beginning treatment (stroke onset = time patient was last seen well or without symptoms).   []   Diagnosis of acute ischemic stroke causing measurable disabling deficit (Complete Hemianopia, Any Aphasia, Visual or Sensory Extinction, Any weakness limiting sustained effort against gravity)   []   Any remaining deficit considered potentially disabling in view of patient and practitioner   Exclusion criteria (Do not proceed with Alteplase if any are checked under exclusion criteria)  [x]   Onset unknown or GREATER than 4.5 hours   []   ICH on CT/MRI   []   CT demonstrates hypodensity representing acute or subacute infarct   []   Significant head trauma or prior stroke in the previous 3 months   []   Symptoms suggestive of subarachnoid hemorrhage   []   History of un-ruptured intracranial aneurysm GREATER than 10 mm   []   Recent intracranial or intraspinal surgery within the last 3 months   []   Arterial puncture at a non-compressible site in the previous 7 days   []   Active internal bleeding   []   Acute bleeding tendency   []   Platelet count LESS than 100,000 for known hematological diseases such as leukemia, thrombocytopenia or chronic cirrhosis   []   Current use of anticoagulant with INR GREATER than 1.7 or PT GREATER than 15 seconds, aPTT GREATER than 40 seconds   []   Heparin received within 48 hours, resulting in abnormally elevated aPTT GREATER than upper limit of normal   []   Current use of direct thrombin inhibitors or direct factor Xa inhibitors in the past 48 hours   []   Elevated blood pressure refractory to treatment (systolic GREATER than 185 mm/Hg or diastolic  GREATER than 110 mm/Hg   []   Suspected infective endocarditis and aortic arch dissection   []   Current use of  therapeutic treatment dose of low-molecular-weight heparin (LMWH) within the previous 24 hours   []   Structural GI malignancy or bleed   Relative exclusion for all patients  []   Only minor non-disabling symptoms   []   Pregnancy   []   Seizure at onset with postictal residual neurological impairments   []   Major surgery or previous trauma within past 14 days   []   History of previous spontaneous ICH, intracranial neoplasm, or AV malformation   []   Postpartum (within previous 14 days)   []   Recent GI or urinary tract hemorrhage (within previous 21 days)   []   Recent acute MI (within previous 3 months)   []   History of un-ruptured intracranial aneurysm LESS than 10 mm   []   History of ruptured intracranial aneurysm   []   Blood glucose LESS than 50 mg/dL (2.7 mmol/L)   []   Dural puncture within the last 7 days   []   Known GREATER than 10 cerebral microbleeds   Additional exclusions for patients with symptoms onset between 3 and 4.5 hours.  []   Age > 80.   []   On any anticoagulants regardless of INR  >>> Warfarin (Coumadin), Heparin, Enoxaparin (Lovenox), fondaparinux (Arixtra), bivalirudin (Angiomax), Argatroban, dabigatran (Pradaxa), rivaroxaban (Xarelto), or apixaban (Eliquis)   []   Severe stroke (NIHSS > 25).   []   History of BOTH diabetes and previous ischemic stroke.   []   The risks and benefits have been discussed with the patient or family related to the administration of IV Alteplase for stroke symptoms.   []   I have discussed and reviewed the patient's case and imaging with the attending prior to IV Alteplase.   Not given  Time Alteplase administered       Past Medical History:   Diagnosis Date   • Anemia    • Colon polyp 2014   • Essential hypertension 2011   • GERD (gastroesophageal reflux disease)    • H/O gonorrhea 10/2016   • High cholesterol 2015   • Intramural and subserous leiomyoma of uterus 2018   • Uterine fibroid 2008     Past Surgical History:   Procedure Laterality Date   • D &  C HYSTEROSCOPY  09/18/2018    path was benign   • LAPAROSCOPIC APPENDECTOMY  2018   • LAPAROSCOPIC TUBAL LIGATION  1988     Family History   Problem Relation Age of Onset   • Cancer Mother    • Ovarian cancer Mother      Social History     Socioeconomic History   • Marital status: Single   Tobacco Use   • Smoking status: Never Smoker   • Smokeless tobacco: Never Used   Vaping Use   • Vaping Use: Never used   Substance and Sexual Activity   • Alcohol use: No   • Drug use: Never   • Sexual activity: Defer     Allergies   Allergen Reactions   • Carvedilol Hives   • Cozaar [Losartan] Hives   • Hydrochlorothiazide Hives   • Hyzaar [Losartan Potassium-Hctz] Hives     Prior to Admission medications    Medication Sig Start Date End Date Taking? Authorizing Provider   amLODIPine (NORVASC) 10 MG tablet Take 1 tablet by mouth Daily. 7/9/18   Ela Brown PA-C   atorvastatin (LIPITOR) 20 MG tablet 1 daily 7/1/18   Provider, MD Jacklyn   loratadine (CLARITIN) 10 MG tablet Take 1 tablet by mouth Daily. 7/9/18   Ela Brown PA-C   triamcinolone (KENALOG) 0.1 % ointment Used twice daily for 2 weeks and then taper to once daily for 2 weeks.  If itching remains well controlled using every other day as needed. 2/26/21   Herve Weiss MD       [facility] Meds:  Scheduled-    Infusions-     PRNs- •  [COMPLETED] Insert peripheral IV **AND** sodium chloride    Functional Status Prior to Current Stroke/Bulloch Score: 0    NIH Stroke Scale  Time: 15:39 EDT  Person Administering Scale: ELDON Orona       1a. Level of Consciousness: 0-->Alert, keenly responsive  1b. LOC Questions: 0-->Answers both questions correctly  1c. LOC Commands: 0-->Performs both tasks correctly  2. Best Gaze: 0-->Normal  3. Visual: 0-->No visual loss  4. Facial Palsy: 0-->Normal symmetrical movements  5a. Motor Arm, Left: 0-->No drift, limb holds 90 (or 45) degrees for full 10 secs  5b. Motor Arm, Right: 0-->No drift, limb holds 90  (or 45) degrees for full 10 secs  6a. Motor Leg, Left: 0-->No drift, leg holds 30 degree position for full 5 secs  6b. Motor Leg, Right: 0-->No drift, leg holds 30 degree position for full 5 secs  7. Limb Ataxia: 0-->Absent  8. Sensory: 1-->Mild-to-moderate sensory loss, patient feels pinprick is less sharp or is dull on the affected side, or there is a loss of superficial pain with pinprick, but patient is aware of being touched  9. Best Language: 0-->No aphasia, normal  10. Dysarthria: 0-->Normal  11. Extinction and Inattention (formerly Neglect): 0-->No abnormality    Total (NIH Stroke Scale): 1      Results Reviewed:  I have personally reviewed current lab, radiology, and data and agree with results.  Lab Results (last 24 hours)     Procedure Component Value Units Date/Time    Comprehensive Metabolic Panel [[id number]]  (Abnormal) Collected: 10/21/21 1321    Specimen: Blood Updated: 10/21/21 1407     Glucose 435 mg/dL      BUN 15 mg/dL      Creatinine 0.86 mg/dL      Sodium 136 mmol/L      Potassium 4.3 mmol/L      Comment: Slight hemolysis detected by analyzer. Results may be affected.        Chloride 100 mmol/L      CO2 24.0 mmol/L      Calcium 9.8 mg/dL      Total Protein 7.3 g/dL      Albumin 4.30 g/dL      ALT (SGPT) 54 U/L      AST (SGOT) 27 U/L      Alkaline Phosphatase 87 U/L      Total Bilirubin 0.4 mg/dL      eGFR Non African Amer 68 mL/min/1.73      eGFR  African Amer 83 mL/min/1.73      Globulin 3.0 gm/dL      A/G Ratio 1.4 g/dL      BUN/Creatinine Ratio 17.4     Anion Gap 12.0 mmol/L     Narrative:      GFR Normal >60  Chronic Kidney Disease <60  Kidney Failure <15      Troponin [[id number]]  (Normal) Collected: 10/21/21 1321    Specimen: Blood Updated: 10/21/21 1357     Troponin T <0.010 ng/mL     Narrative:      Troponin T Reference Range:  <= 0.03 ng/mL-   Negative for AMI  >0.03 ng/mL-     Abnormal for myocardial necrosis.  Clinicians would have to utilize clinical acumen, EKG, Troponin and serial  changes to determine if it is an Acute Myocardial Infarction or myocardial injury due to an underlying chronic condition.       Results may be falsely decreased if patient taking Biotin.      CBC & Differential [[id number]]  (Abnormal) Collected: 10/21/21 1321    Specimen: Blood Updated: 10/21/21 1331    Narrative:      The following orders were created for panel order CBC & Differential.  Procedure                               Abnormality         Status                     ---------                               -----------         ------                     CBC Auto Differential[[id number]]        Abnormal            Final result                 Please view results for these tests on the individual orders.    CBC Auto Differential [[id number]]  (Abnormal) Collected: 10/21/21 1321    Specimen: Blood Updated: 10/21/21 1331     WBC 8.85 10*3/mm3      RBC 5.11 10*6/mm3      Hemoglobin 15.2 g/dL      Hematocrit 46.2 %      MCV 90.4 fL      MCH 29.7 pg      MCHC 32.9 g/dL      RDW 11.4 %      RDW-SD 38.1 fl      MPV 10.6 fL      Platelets 290 10*3/mm3      Neutrophil % 56.8 %      Lymphocyte % 34.8 %      Monocyte % 5.5 %      Eosinophil % 2.0 %      Basophil % 0.7 %      Immature Grans % 0.2 %      Neutrophils, Absolute 5.02 10*3/mm3      Lymphocytes, Absolute 3.08 10*3/mm3      Monocytes, Absolute 0.49 10*3/mm3      Eosinophils, Absolute 0.18 10*3/mm3      Basophils, Absolute 0.06 10*3/mm3      Immature Grans, Absolute 0.02 10*3/mm3      nRBC 0.0 /100 WBC         Imaging Results (Last 24 Hours)     Procedure Component Value Units Date/Time    MRI Brain With & Without Contrast [[id number]] Collected: 10/21/21 1448     Updated: 10/21/21 1519    Narrative:      EXAMINATION: MRI BRAIN W WO CONTRAST-      INDICATION: left facial/extremity paresthesias     TECHNIQUE: Multiplanar, multisequence MR imaging of the brain before and  after IV gadolinium base contrast.     COMPARISON: NONE     FINDINGS:      There are 3 small foci  of acute infarcts, one in the right thalamus and  2 adjacent foci in the right corona radiata/pericallosal white matter  (series 2 image 58 and series 2 image 60-61). On postcontrast sequences  there is some corresponding peripheral enhancement about the right  corona radiata infarct (series 11 image 14). There is evidence of prior  right basal ganglia lacunar infarct. There is background subcortical and  periventricular white matter FLAIR/T2 hyperintensities which are  nonspecific but can be seen in setting of chronic small vessel ischemic  change. No acute intracranial hemorrhage. No intracranial mass. The  midline structures are unremarkable. Normal size and configuration of  the ventricles without evidence of extra-axial collection, midline  shift, or herniation. Flow voids and enhancement of the large  intracranial arterial vasculature appear unremarkable on this  nondedicated exam. Normal appearance of the orbits. The mastoid air  cells and paranasal sinuses are clear. No acute bony findings.          Impression:         Scattered small acute infarcts in the right thalamus and right corona  radiata/pericallosal white matter.     Evidence of chronic right basal ganglia lacunar infarct on the  background of scattered subcortical and periventricular white matter  hyperintensities which may reflect sequelae of chronic small vessel  ischemic change.        Findings discussed with Dr. Kelly by Dr. Fields via telephone at  2:50 PM 10/21/2021. Patient endorses left-sided paresthesias and  symptoms.        This report was finalized on 10/21/2021 3:12 PM by Moises Fields MD.       XR Chest 1 View [[id number]] Collected: 10/21/21 1356     Updated: 10/21/21 1402    Narrative:      EXAMINATION: XR CHEST 1 VW-      INDICATION: left side paresthesias     TECHNIQUE: Frontal view of the chest     COMPARISON: NONE     FINDINGS:      The cardiomediastinal silhouette is normal. No focal consolidation. A  small 3.0 x 1.8 cm  crescentic pleural-based density at the left apex is  noted. No pneumothorax. No acute osseous findings.       Impression:         Small crescentic pleural based density at the left apex is  indeterminant. Consider CT for further evaluation.     The lungs are clear.     This report was finalized on 10/21/2021 1:59 PM by Moises Fields MD.               Assessment/Plan:  This is a 56-year-old female with known medical diagnoses of hypertension, hyperlipidemia, diabetes, medication noncompliance and cocaine abuse.  She presents to [facility] ED today with concerns for left sided paresthesias of the face arm and leg.    1. Small scattered strokes in right hemisphere  -MRI brain without contrast revealed small scattered strokes in right thalamus and corona radiata as well as chronic appearing stroke in right basal ganglia  -MRA head and neck ordered  -TTE, lipid panel, hemoglobin A1c ordered  -Loaded with Plavix 300 mg followed by 75 mg a day after x21 days  -Aspirin 81 mg and Lipitor 80 mg for secondary stroke prevention  -PT/OT/SLP consulted and may work with patient in AM  -bedrest for the night    2.  Hypertension  -SBP<180  -Reinforced importance of compliance with medications    3.  Hyperlipidemia  -Lipid panel  -High intensity statin    4.  DM 2  -check HgbA1c  -presenting glucose 435  -reinforced importance of compliance    5. Cocaine use  -encourage total cessation       Discussed plan of care with patient, ED provider.  Stroke neurology will follow patient.  The you for this consult    ELDON Orona  October 21, 2021  15:39 EDT

## 2021-10-21 NOTE — H&P
[facility] Medicine Services  HISTORY AND PHYSICAL    Patient Name: Shital Guzmán  : 1965  MRN: [id number]  Primary Care Physician: Ela Brown PA-C  Date of admission: 10/21/2021      Subjective   Subjective     Chief Complaint: Left-sided numbness    HPI:  Shital Guzmán is a 56 y.o. female with prior history of hypertension, hyperlipidemia, and type 2 diabetes, (stopped taking meds a few months ago).  She presents with 1 day history of left-sided numbness, this includes left face, left arm and leg.  When she awoke this morning, she was unable to stand on her feet hence her presentation to the ED.  On arrival, she is hypertensive, otherwise VSS.  MRI was obtained that revealed scattered small acute infarcts in the right thalamus and right corona radiata, chronic right basal ganglia lacunar infarct.  Stroke navigators were consulted, hospital medicine was asked to admit.    COVID Details:    Symptoms:    [] NONE [] Fever []  Cough [] Shortness of breath [] Change in taste/smell      The patient has started, but not completed, their COVID-19 vaccination series.    Review of Systems   Constitutional: Awake, alert  Eyes: PERRLA, sclerae anicteric, no conjunctival injection  HENT: NCAT, mucous membranes moist  Neck: Supple, no thyromegaly, no lymphadenopathy, trachea midline  Respiratory: Clear to auscultation bilaterally, nonlabored respirations   Cardiovascular: RRR, no murmurs, rubs, or gallops, palpable pedal pulses bilaterally  Gastrointestinal: Positive bowel sounds, soft, nontender, nondistended  Musculoskeletal: No bilateral ankle edema, no clubbing or cyanosis to extremities  Psychiatric: Appropriate affect, cooperative  Neurologic: Oriented x 3, left facial, upper and lower extremity numbness, strength symmetric in all extremities.    Skin: No rashes    All other systems reviewed and are negative.     Personal History     Past Medical History:   Diagnosis Date   •  Anemia    • Colon polyp 2014   • Essential hypertension 2011   • GERD (gastroesophageal reflux disease)    • H/O gonorrhea 10/2016   • High cholesterol 2015   • Intramural and subserous leiomyoma of uterus 2018   • Uterine fibroid 2008     Past Surgical History:   Procedure Laterality Date   • D & C HYSTEROSCOPY  09/18/2018    path was benign   • LAPAROSCOPIC APPENDECTOMY  2018   • LAPAROSCOPIC TUBAL LIGATION  1988       Family History:  family history includes Cancer in her mother; Ovarian cancer in her mother. Otherwise pertinent FHx was reviewed and unremarkable.     Social History:  reports that she has never smoked. She has never used smokeless tobacco. She reports that she does not drink alcohol and does not use drugs.  Social History     Social History Narrative   • Not on file       Medications:  Available home medication information reviewed.  (Not in a hospital admission)      Allergies   Allergen Reactions   • Carvedilol Hives   • Cozaar [Losartan] Hives   • Hydrochlorothiazide Hives   • Hyzaar [Losartan Potassium-Hctz] Hives       Objective   Objective     Vital Signs:   Temp:  [97.8 °F (36.6 °C)] 97.8 °F (36.6 °C)  Heart Rate:  [89-99] 95  Resp:  [15-16] 16  BP: (146-176)/() 158/104       Physical Exam   Constitutional: Awake, alert  Eyes: PERRLA, sclerae anicteric, no conjunctival injection  HENT: NCAT, mucous membranes moist  Neck: Supple, no thyromegaly, no lymphadenopathy, trachea midline  Respiratory: Clear to auscultation bilaterally, nonlabored respirations   Cardiovascular: RRR, no murmurs, rubs, or gallops, palpable pedal pulses bilaterally  Gastrointestinal: Positive bowel sounds, soft, nontender, nondistended  Musculoskeletal: No bilateral ankle edema, no clubbing or cyanosis to extremities  Psychiatric: Appropriate affect, cooperative  Neurologic: Oriented x 3, strength symmetric in all extremities, Cranial Nerves grossly intact to confrontation, speech clear  Skin: No rashes    Result  Review:  I have personally reviewed the results from the time of this admission to 10/21/2021 15:52 EDT and agree with these findings:  [x]  Laboratory  []  Microbiology  [x]  Radiology  []  EKG/Telemetry   []  Cardiology/Vascular   []  Pathology  []  Old records  []  Other:  Most notable findings include:       LAB RESULTS:      Lab 10/21/21  1321   WBC 8.85   HEMOGLOBIN 15.2   HEMATOCRIT 46.2   PLATELETS 290   NEUTROS ABS 5.02   IMMATURE GRANS (ABS) 0.02   LYMPHS ABS 3.08   MONOS ABS 0.49   EOS ABS 0.18   MCV 90.4         Lab 10/21/21  1321   SODIUM 136   POTASSIUM 4.3   CHLORIDE 100   CO2 24.0   ANION GAP 12.0   BUN 15   CREATININE 0.86   GLUCOSE 435*   CALCIUM 9.8         Lab 10/21/21  1321   TOTAL PROTEIN 7.3   ALBUMIN 4.30   GLOBULIN 3.0   ALT (SGPT) 54*   AST (SGOT) 27   BILIRUBIN 0.4   ALK PHOS 87         Lab 10/21/21  1321   TROPONIN T <0.010                     Microbiology Results (last 10 days)     ** No results found for the last 240 hours. **          MRI Brain With & Without Contrast    Result Date: 10/21/2021  EXAMINATION: MRI BRAIN W WO CONTRAST-  INDICATION: left facial/extremity paresthesias  TECHNIQUE: Multiplanar, multisequence MR imaging of the brain before and after IV gadolinium base contrast.  COMPARISON: NONE  FINDINGS:  There are 3 small foci of acute infarcts, one in the right thalamus and 2 adjacent foci in the right corona radiata/pericallosal white matter (series 2 image 58 and series 2 image 60-61). On postcontrast sequences there is some corresponding peripheral enhancement about the right corona radiata infarct (series 11 image 14). There is evidence of prior right basal ganglia lacunar infarct. There is background subcortical and periventricular white matter FLAIR/T2 hyperintensities which are nonspecific but can be seen in setting of chronic small vessel ischemic change. No acute intracranial hemorrhage. No intracranial mass. The midline structures are unremarkable. Normal size  and configuration of the ventricles without evidence of extra-axial collection, midline shift, or herniation. Flow voids and enhancement of the large intracranial arterial vasculature appear unremarkable on this nondedicated exam. Normal appearance of the orbits. The mastoid air cells and paranasal sinuses are clear. No acute bony findings.       Impression:  Scattered small acute infarcts in the right thalamus and right corona radiata/pericallosal white matter.  Evidence of chronic right basal ganglia lacunar infarct on the background of scattered subcortical and periventricular white matter hyperintensities which may reflect sequelae of chronic small vessel ischemic change.   Findings discussed with Dr. Kelly by Dr. Fields via telephone at 2:50 PM 10/21/2021. Patient endorses left-sided paresthesias and symptoms.   This report was finalized on 10/21/2021 3:12 PM by Moises Fields MD.      XR Chest 1 View    Result Date: 10/21/2021  EXAMINATION: XR CHEST 1 VW-  INDICATION: left side paresthesias  TECHNIQUE: Frontal view of the chest  COMPARISON: NONE  FINDINGS:  The cardiomediastinal silhouette is normal. No focal consolidation. A small 3.0 x 1.8 cm crescentic pleural-based density at the left apex is noted. No pneumothorax. No acute osseous findings.      Impression:  Small crescentic pleural based density at the left apex is indeterminant. Consider CT for further evaluation.  The lungs are clear.  This report was finalized on 10/21/2021 1:59 PM by Moises Fields MD.      Assessment/Plan   Assessment & Plan     Active Hospital Problems    Diagnosis  POA   • **Acute stroke due to ischemia (HCC) [I63.9]  Yes   • Essential hypertension [I10]  Yes   • Hyperlipidemia [E78.5]  Yes     56-year-old female with history of hypertension and hyperlipidemia presented with left-sided numbness found to have acute infarcts in the right thalamus    Right thalamic infarct   -Etiology is unknown, possibly embolic, stroke  navigators following  - stroke work-up per protocol, follow-up echo  -Continue aspirin and statin  -PT/OT to evaluate    Hypertension  -No permissive hypertension    Hyperlipidemia  -Continue statin    History of type 2 diabetes  -Patient stopped taking Metformin since she has lost significant weight  -However has a glucose of 435 on presentation, will start SSI for now, follow-up A1c    DVT prophylaxis: Mechanical:    CODE STATUS: full  There are no questions and answers to display.     Admission Status:  I believe this patient meets INPATIENT status due to CVA.  I feel patient’s risk for adverse outcomes and need for care warrant INPATIENT evaluation and I predict the patient’s care encounter to likely last beyond 2 midnights.    Ayla Reyes MD  10/21/21

## 2021-10-21 NOTE — CASE MANAGEMENT/SOCIAL WORK
Discharge Planning Assessment  [facility]     Patient Name: Shital Guzmán  MRN: [id number]  Today's Date: 10/21/2021    Admit Date: 10/21/2021     Discharge Needs Assessment     Row Name 10/21/21 1545       Living Environment    Lives With alone    Current Living Arrangements home/apartment/condo    Primary Care Provided by self    Provides Primary Care For no one    Family Caregiver if Needed none    Quality of Family Relationships unable to assess    Able to Return to Prior Arrangements yes       Resource/Environmental Concerns    Resource/Environmental Concerns none    Transportation Concerns car, none       Transition Planning    Patient/Family Anticipates Transition to home    Patient/Family Anticipated Services at Transition none    Transportation Anticipated family or friend will provide       Discharge Needs Assessment    Readmission Within the Last 30 Days no previous admission in last 30 days    Equipment Currently Used at Home none    Concerns to be Addressed denies needs/concerns at this time    Anticipated Changes Related to Illness none    Equipment Needed After Discharge other (see comments)  TBD    Discharge Facility/Level of Care Needs other (see comments)  TBD               Discharge Plan     Row Name 10/21/21 1546       Plan    Plan IDP    Patient/Family in Agreement with Plan yes    Plan Comments SW met with pt at bedside for IDP. Pt states she lives in [facility], alone. Pt states her PCP is Ela Brown PA-C. Pt states she has medical coverage through [employer] and is able to afford her medications. Pt states she is independent with ADL’s. Pt denies any DME. Pt denied any HH/O2/BiPAP/CPAP. Pt states her friend will be able to provide transportation at d/c. Pt states goal at d/c is to return home. Pt denied any d/c concerns/needs at this time. CM will continue to follow.    Final Discharge Disposition Code 30 - still a patient              Continued Care and Services - Admitted Since  10/21/2021    Coordination has not been started for this encounter.          Demographic Summary     Row Name 10/21/21 1544       General Information    Arrived From home    Referral Source emergency department    Reason for Consult discharge planning    Preferred Language English     Used During This Interaction no    General Information Comments Pt states her PCP is Ela Brown PA-C.       Contact Information    Contact Information Comments VINCENZO CARVALHO   [phone]               Functional Status     Row Name 10/21/21 1545       Functional Status, IADL    Medications independent    Meal Preparation independent    Housekeeping independent    Laundry independent    Shopping independent       Employment/    Employment Status unemployed               Psychosocial    No documentation.                Abuse/Neglect    No documentation.                Legal    No documentation.                Substance Abuse    No documentation.                Patient Forms    No documentation.                   KAITY Delaney

## 2021-10-21 NOTE — ED NOTES
Called 3E to give report. Receiving RN unavailable. States she will call back.      Melissa David, RN  10/21/21 0123

## 2021-10-22 ENCOUNTER — APPOINTMENT (OUTPATIENT)
Dept: MRI IMAGING | Facility: HOSPITAL | Age: 56
End: 2021-10-22

## 2021-10-22 ENCOUNTER — APPOINTMENT (OUTPATIENT)
Dept: CARDIOLOGY | Facility: HOSPITAL | Age: 56
End: 2021-10-22

## 2021-10-22 LAB
BASOPHILS # BLD MANUAL: 0 10*3/MM3 (ref 0–0.2)
BASOPHILS NFR BLD AUTO: 0 % (ref 0–1.5)
BH CV ECHO MEAS - AO MAX PG (FULL): 17.5 MMHG
BH CV ECHO MEAS - AO MAX PG: 21.2 MMHG
BH CV ECHO MEAS - AO MEAN PG (FULL): 10 MMHG
BH CV ECHO MEAS - AO MEAN PG: 12 MMHG
BH CV ECHO MEAS - AO ROOT AREA (BSA CORRECTED): 1.8
BH CV ECHO MEAS - AO ROOT AREA: 8 CM^2
BH CV ECHO MEAS - AO ROOT DIAM: 3.2 CM
BH CV ECHO MEAS - AO V2 MAX: 230 CM/SEC
BH CV ECHO MEAS - AO V2 MEAN: 165 CM/SEC
BH CV ECHO MEAS - AO V2 VTI: 40.3 CM
BH CV ECHO MEAS - ASC AORTA: 3.5 CM
BH CV ECHO MEAS - AVA(I,A): 1.3 CM^2
BH CV ECHO MEAS - AVA(I,D): 1.3 CM^2
BH CV ECHO MEAS - AVA(V,A): 1.3 CM^2
BH CV ECHO MEAS - AVA(V,D): 1.3 CM^2
BH CV ECHO MEAS - BSA(HAYCOCK): 1.8 M^2
BH CV ECHO MEAS - BSA: 1.7 M^2
BH CV ECHO MEAS - BZI_BMI: 29.3 KILOGRAMS/M^2
BH CV ECHO MEAS - BZI_METRIC_HEIGHT: 157.5 CM
BH CV ECHO MEAS - BZI_METRIC_WEIGHT: 72.6 KG
BH CV ECHO MEAS - EDV(CUBED): 54 ML
BH CV ECHO MEAS - EDV(MOD-SP2): 74 ML
BH CV ECHO MEAS - EDV(MOD-SP4): 61 ML
BH CV ECHO MEAS - EDV(TEICH): 61.2 ML
BH CV ECHO MEAS - EF(CUBED): 62.7 %
BH CV ECHO MEAS - EF(MOD-BP): 59 %
BH CV ECHO MEAS - EF(MOD-SP2): 54.1 %
BH CV ECHO MEAS - EF(MOD-SP4): 59 %
BH CV ECHO MEAS - EF(TEICH): 55 %
BH CV ECHO MEAS - ESV(CUBED): 20.1 ML
BH CV ECHO MEAS - ESV(MOD-SP2): 34 ML
BH CV ECHO MEAS - ESV(MOD-SP4): 25 ML
BH CV ECHO MEAS - ESV(TEICH): 27.5 ML
BH CV ECHO MEAS - FS: 28 %
BH CV ECHO MEAS - IVS/LVPW: 1
BH CV ECHO MEAS - IVSD: 1.3 CM
BH CV ECHO MEAS - LA DIMENSION: 3.1 CM
BH CV ECHO MEAS - LA/AO: 0.97
BH CV ECHO MEAS - LAD MAJOR: 5.1 CM
BH CV ECHO MEAS - LAT PEAK E' VEL: 6 CM/SEC
BH CV ECHO MEAS - LATERAL E/E' RATIO: 12
BH CV ECHO MEAS - LV DIASTOLIC VOL/BSA (35-75): 35.1 ML/M^2
BH CV ECHO MEAS - LV MASS(C)D: 175.8 GRAMS
BH CV ECHO MEAS - LV MASS(C)DI: 101.1 GRAMS/M^2
BH CV ECHO MEAS - LV MAX PG: 3.6 MMHG
BH CV ECHO MEAS - LV MEAN PG: 2 MMHG
BH CV ECHO MEAS - LV SYSTOLIC VOL/BSA (12-30): 14.4 ML/M^2
BH CV ECHO MEAS - LV V1 MAX: 95.2 CM/SEC
BH CV ECHO MEAS - LV V1 MEAN: 60.4 CM/SEC
BH CV ECHO MEAS - LV V1 VTI: 16.2 CM
BH CV ECHO MEAS - LVIDD: 3.8 CM
BH CV ECHO MEAS - LVIDS: 2.7 CM
BH CV ECHO MEAS - LVLD AP2: 7.4 CM
BH CV ECHO MEAS - LVLD AP4: 6.7 CM
BH CV ECHO MEAS - LVLS AP2: 6.2 CM
BH CV ECHO MEAS - LVLS AP4: 6.2 CM
BH CV ECHO MEAS - LVOT AREA (M): 3.1 CM^2
BH CV ECHO MEAS - LVOT AREA: 3.1 CM^2
BH CV ECHO MEAS - LVOT DIAM: 2 CM
BH CV ECHO MEAS - LVPWD: 1.3 CM
BH CV ECHO MEAS - MED PEAK E' VEL: 4.7 CM/SEC
BH CV ECHO MEAS - MEDIAL E/E' RATIO: 15.1
BH CV ECHO MEAS - MV A MAX VEL: 118 CM/SEC
BH CV ECHO MEAS - MV DEC SLOPE: 364 CM/SEC^2
BH CV ECHO MEAS - MV DEC TIME: 0.23 SEC
BH CV ECHO MEAS - MV E MAX VEL: 71.6 CM/SEC
BH CV ECHO MEAS - MV E/A: 0.61
BH CV ECHO MEAS - MV P1/2T MAX VEL: 90.3 CM/SEC
BH CV ECHO MEAS - MV P1/2T: 72.7 MSEC
BH CV ECHO MEAS - MVA P1/2T LCG: 2.4 CM^2
BH CV ECHO MEAS - MVA(P1/2T): 3 CM^2
BH CV ECHO MEAS - PA ACC SLOPE: 680.5 CM/SEC^2
BH CV ECHO MEAS - PA ACC TIME: 0.1 SEC
BH CV ECHO MEAS - PA MAX PG: 3.7 MMHG
BH CV ECHO MEAS - PA PR(ACCEL): 32.7 MMHG
BH CV ECHO MEAS - PA V2 MAX: 95.6 CM/SEC
BH CV ECHO MEAS - PI END-D VEL: 126 CM/SEC
BH CV ECHO MEAS - PULM A REVS VEL: 29.4 CM/SEC
BH CV ECHO MEAS - PULM DIAS VEL: 28.1 CM/SEC
BH CV ECHO MEAS - PULM S/D: 1.6
BH CV ECHO MEAS - PULM SYS VEL: 44.6 CM/SEC
BH CV ECHO MEAS - RAP SYSTOLE: 3 MMHG
BH CV ECHO MEAS - SI(AO): 186.4 ML/M^2
BH CV ECHO MEAS - SI(CUBED): 19.5 ML/M^2
BH CV ECHO MEAS - SI(LVOT): 29.2 ML/M^2
BH CV ECHO MEAS - SI(MOD-SP2): 23 ML/M^2
BH CV ECHO MEAS - SI(MOD-SP4): 20.7 ML/M^2
BH CV ECHO MEAS - SI(TEICH): 19.4 ML/M^2
BH CV ECHO MEAS - SV(AO): 324.1 ML
BH CV ECHO MEAS - SV(CUBED): 33.9 ML
BH CV ECHO MEAS - SV(LVOT): 50.7 ML
BH CV ECHO MEAS - SV(MOD-SP2): 40 ML
BH CV ECHO MEAS - SV(MOD-SP4): 36 ML
BH CV ECHO MEAS - SV(TEICH): 33.7 ML
BH CV ECHO MEAS - TAPSE (>1.6): 2.1 CM
BH CV ECHO MEASUREMENTS AVERAGE E/E' RATIO: 13.38
BH CV XLRA - RV BASE: 3 CM
BH CV XLRA - RV LENGTH: 6.7 CM
BH CV XLRA - RV MID: 3.5 CM
CHOLEST SERPL-MCNC: 268 MG/DL (ref 0–200)
DEPRECATED RDW RBC AUTO: 37.9 FL (ref 37–54)
EOSINOPHIL # BLD MANUAL: 0.31 10*3/MM3 (ref 0–0.4)
EOSINOPHIL NFR BLD MANUAL: 3 % (ref 0.3–6.2)
ERYTHROCYTE [DISTWIDTH] IN BLOOD BY AUTOMATED COUNT: 11.4 % (ref 12.3–15.4)
GLUCOSE BLDC GLUCOMTR-MCNC: 226 MG/DL (ref 70–130)
GLUCOSE BLDC GLUCOMTR-MCNC: 275 MG/DL (ref 70–130)
GLUCOSE BLDC GLUCOMTR-MCNC: 312 MG/DL (ref 70–130)
GLUCOSE BLDC GLUCOMTR-MCNC: 355 MG/DL (ref 70–130)
HBA1C MFR BLD: 11.3 % (ref 4.8–5.6)
HCT VFR BLD AUTO: 43.9 % (ref 34–46.6)
HDLC SERPL-MCNC: 43 MG/DL (ref 40–60)
HGB BLD-MCNC: 14.9 G/DL (ref 12–15.9)
LDLC SERPL CALC-MCNC: 199 MG/DL (ref 0–100)
LDLC/HDLC SERPL: 4.57 {RATIO}
LEFT ATRIUM VOLUME INDEX: 23.6 ML/M^2
LEFT ATRIUM VOLUME: 41 ML
LYMPHOCYTES # BLD MANUAL: 4.52 10*3/MM3 (ref 0.7–3.1)
LYMPHOCYTES NFR BLD MANUAL: 4 % (ref 5–12)
LYMPHOCYTES NFR BLD MANUAL: 41 % (ref 19.6–45.3)
MAXIMAL PREDICTED HEART RATE: 164 BPM
MCH RBC QN AUTO: 30.7 PG (ref 26.6–33)
MCHC RBC AUTO-ENTMCNC: 33.9 G/DL (ref 31.5–35.7)
MCV RBC AUTO: 90.5 FL (ref 79–97)
MONOCYTES # BLD AUTO: 0.41 10*3/MM3 (ref 0.1–0.9)
NEUTROPHILS # BLD AUTO: 5.04 10*3/MM3 (ref 1.7–7)
NEUTROPHILS NFR BLD MANUAL: 48 % (ref 42.7–76)
NEUTS BAND NFR BLD MANUAL: 1 % (ref 0–5)
PLAT MORPH BLD: NORMAL
PLATELET # BLD AUTO: 287 10*3/MM3 (ref 140–450)
PMV BLD AUTO: 10.4 FL (ref 6–12)
RBC # BLD AUTO: 4.85 10*6/MM3 (ref 3.77–5.28)
RBC MORPH BLD: NORMAL
SARS-COV-2 RNA PNL SPEC NAA+PROBE: NOT DETECTED
SMUDGE CELLS BLD QL SMEAR: ABNORMAL
STRESS TARGET HR: 139 BPM
TRIGL SERPL-MCNC: 142 MG/DL (ref 0–150)
VARIANT LYMPHS NFR BLD MANUAL: 3 % (ref 0–5)
VLDLC SERPL-MCNC: 26 MG/DL (ref 5–40)
WBC # BLD AUTO: 10.28 10*3/MM3 (ref 3.4–10.8)

## 2021-10-22 PROCEDURE — 63710000001 INSULIN LISPRO (HUMAN) PER 5 UNITS: Performed by: INTERNAL MEDICINE

## 2021-10-22 PROCEDURE — 99223 1ST HOSP IP/OBS HIGH 75: CPT | Performed by: INTERNAL MEDICINE

## 2021-10-22 PROCEDURE — 63710000001 INSULIN DETEMIR PER 5 UNITS: Performed by: INTERNAL MEDICINE

## 2021-10-22 PROCEDURE — 83036 HEMOGLOBIN GLYCOSYLATED A1C: CPT | Performed by: NURSE PRACTITIONER

## 2021-10-22 PROCEDURE — 97165 OT EVAL LOW COMPLEX 30 MIN: CPT

## 2021-10-22 PROCEDURE — 92523 SPEECH SOUND LANG COMPREHEN: CPT

## 2021-10-22 PROCEDURE — 97161 PT EVAL LOW COMPLEX 20 MIN: CPT

## 2021-10-22 PROCEDURE — 82962 GLUCOSE BLOOD TEST: CPT

## 2021-10-22 PROCEDURE — 93306 TTE W/DOPPLER COMPLETE: CPT

## 2021-10-22 PROCEDURE — 97116 GAIT TRAINING THERAPY: CPT

## 2021-10-22 PROCEDURE — 70547 MR ANGIOGRAPHY NECK W/O DYE: CPT

## 2021-10-22 PROCEDURE — 70544 MR ANGIOGRAPHY HEAD W/O DYE: CPT

## 2021-10-22 PROCEDURE — 93306 TTE W/DOPPLER COMPLETE: CPT | Performed by: INTERNAL MEDICINE

## 2021-10-22 PROCEDURE — 99233 SBSQ HOSP IP/OBS HIGH 50: CPT | Performed by: INTERNAL MEDICINE

## 2021-10-22 PROCEDURE — 93893 TCD STD ICR ART VEN-ART SHNT: CPT

## 2021-10-22 PROCEDURE — 80061 LIPID PANEL: CPT | Performed by: NURSE PRACTITIONER

## 2021-10-22 PROCEDURE — 85025 COMPLETE CBC W/AUTO DIFF WBC: CPT | Performed by: INTERNAL MEDICINE

## 2021-10-22 RX ORDER — LOSARTAN POTASSIUM 25 MG/1
25 TABLET ORAL
Status: DISCONTINUED | OUTPATIENT
Start: 2021-10-22 | End: 2021-10-23 | Stop reason: HOSPADM

## 2021-10-22 RX ORDER — NICOTINE POLACRILEX 4 MG
15 LOZENGE BUCCAL
Status: DISCONTINUED | OUTPATIENT
Start: 2021-10-22 | End: 2021-10-23 | Stop reason: HOSPADM

## 2021-10-22 RX ORDER — NITROFURANTOIN 25; 75 MG/1; MG/1
100 CAPSULE ORAL EVERY 12 HOURS SCHEDULED
Status: DISCONTINUED | OUTPATIENT
Start: 2021-10-22 | End: 2021-10-22

## 2021-10-22 RX ORDER — DEXTROSE MONOHYDRATE 25 G/50ML
25 INJECTION, SOLUTION INTRAVENOUS
Status: DISCONTINUED | OUTPATIENT
Start: 2021-10-22 | End: 2021-10-23 | Stop reason: HOSPADM

## 2021-10-22 RX ORDER — FLUCONAZOLE 100 MG/1
100 TABLET ORAL EVERY 24 HOURS
Status: COMPLETED | OUTPATIENT
Start: 2021-10-22 | End: 2021-10-22

## 2021-10-22 RX ADMIN — INSULIN DETEMIR 10 UNITS: 100 INJECTION, SOLUTION SUBCUTANEOUS at 09:27

## 2021-10-22 RX ADMIN — INSULIN LISPRO 8 UNITS: 100 INJECTION, SOLUTION INTRAVENOUS; SUBCUTANEOUS at 12:43

## 2021-10-22 RX ADMIN — NITROFURANTOIN (MONOHYDRATE/MACROCRYSTALS) 100 MG: 75; 25 CAPSULE ORAL at 12:34

## 2021-10-22 RX ADMIN — ATORVASTATIN CALCIUM 80 MG: 40 TABLET, FILM COATED ORAL at 20:48

## 2021-10-22 RX ADMIN — CLOPIDOGREL BISULFATE 75 MG: 75 TABLET ORAL at 09:27

## 2021-10-22 RX ADMIN — DOCUSATE SODIUM 50 MG AND SENNOSIDES 8.6 MG 2 TABLET: 8.6; 5 TABLET, FILM COATED ORAL at 20:48

## 2021-10-22 RX ADMIN — FLUCONAZOLE 100 MG: 100 TABLET ORAL at 16:20

## 2021-10-22 RX ADMIN — ASPIRIN 81 MG CHEWABLE TABLET 81 MG: 81 TABLET CHEWABLE at 09:27

## 2021-10-22 RX ADMIN — DOCUSATE SODIUM 50 MG AND SENNOSIDES 8.6 MG 2 TABLET: 8.6; 5 TABLET, FILM COATED ORAL at 09:27

## 2021-10-22 RX ADMIN — LOSARTAN POTASSIUM 25 MG: 25 TABLET, FILM COATED ORAL at 16:20

## 2021-10-22 RX ADMIN — SODIUM CHLORIDE, PRESERVATIVE FREE 10 ML: 5 INJECTION INTRAVENOUS at 20:49

## 2021-10-22 RX ADMIN — INSULIN LISPRO 4 UNITS: 100 INJECTION, SOLUTION INTRAVENOUS; SUBCUTANEOUS at 18:15

## 2021-10-22 NOTE — PAYOR COMM NOTE
"Henry Guzmán (56 y.o. Female)             Date of Birth Social Security Number Address Home Phone MRN    1965  [address] [address] [phone] [id number]    [employer] Marital Status             None Single       Admission Date Admission Type Admitting Provider Attending Provider Department, Room/Bed    10/21/21 Emergency Adriana Flanagan MD Seward, Kathryn L, MD [facility] 3E, S345/1    Discharge Date Discharge Disposition Discharge Destination                         Attending Provider: Adriana Flanagan MD    Allergies: Carvedilol, Cozaar [Losartan], Hydrochlorothiazide, Hyzaar [Losartan Potassium-hctz]    Isolation: None   Infection: COVID (rule out) (10/21/21)   Code Status: CPR   Advance Care Planning Activity    Ht: 157.5 cm (62\")   Wt: 72.6 kg (160 lb)    Admission Cmt: None   Principal Problem: Acute stroke due to ischemia (HCC) [I63.9]                 Active Insurance as of 10/21/2021     Primary Coverage     Payor Plan Insurance Group Employer/Plan Group    ANTHEM MEDICAID ANTHEM MEDICAID [id number]     Payor Plan Address Payor Plan Phone Number Payor Plan Fax Number Effective Dates    [address] [address] [phone]  2018 - None Entered    [facility] [phone]       Subscriber Name Subscriber Birth Date Member ID       HENRY GUZMÁN 1965 [id number]                 Emergency Contacts      (Rel.) Home Phone Work Phone Mobile Phone    VINCENZO CARVALHO (Son) -- -- [phone]    NEIGHBORNIXON (Neighbor) -- -- [phone]            Insurance Information                ANTHEM MEDICAID/ANTHEM MEDICAID Phone: [phone]    Subscriber: Henry Guzmán Subscriber#: [id number]    Group#: [id number] Precert#: --             History & Physical      Ayla Reyes MD at 10/21/21 1541              [facility] Medicine Services  HISTORY AND PHYSICAL    Patient Name: Henry Guzmán  : 1965  MRN: " [id number]  Primary Care Physician: Ela Brown PA-C  Date of admission: 10/21/2021      Subjective   Subjective     Chief Complaint: Left-sided numbness    HPI:  Shital Guzmán is a 56 y.o. female with prior history of hypertension, hyperlipidemia, and type 2 diabetes, (stopped taking meds a few months ago).  She presents with 1 day history of left-sided numbness, this includes left face, left arm and leg.  When she awoke this morning, she was unable to stand on her feet hence her presentation to the ED.  On arrival, she is hypertensive, otherwise VSS.  MRI was obtained that revealed scattered small acute infarcts in the right thalamus and right corona radiata, chronic right basal ganglia lacunar infarct.  Stroke navigators were consulted, hospital medicine was asked to admit.    COVID Details:    Symptoms:    [] NONE [] Fever []  Cough [] Shortness of breath [] Change in taste/smell      The patient has started, but not completed, their COVID-19 vaccination series.    Review of Systems   Constitutional: Awake, alert  Eyes: PERRLA, sclerae anicteric, no conjunctival injection  HENT: NCAT, mucous membranes moist  Neck: Supple, no thyromegaly, no lymphadenopathy, trachea midline  Respiratory: Clear to auscultation bilaterally, nonlabored respirations   Cardiovascular: RRR, no murmurs, rubs, or gallops, palpable pedal pulses bilaterally  Gastrointestinal: Positive bowel sounds, soft, nontender, nondistended  Musculoskeletal: No bilateral ankle edema, no clubbing or cyanosis to extremities  Psychiatric: Appropriate affect, cooperative  Neurologic: Oriented x 3, left facial, upper and lower extremity numbness, strength symmetric in all extremities.    Skin: No rashes    All other systems reviewed and are negative.     Personal History     Past Medical History:   Diagnosis Date   • Anemia    • Colon polyp 2014   • Essential hypertension 2011   • GERD (gastroesophageal reflux disease)    • H/O gonorrhea 10/2016   •  High cholesterol 2015   • Intramural and subserous leiomyoma of uterus 2018   • Uterine fibroid 2008     Past Surgical History:   Procedure Laterality Date   • D & C HYSTEROSCOPY  09/18/2018    path was benign   • LAPAROSCOPIC APPENDECTOMY  2018   • LAPAROSCOPIC TUBAL LIGATION  1988       Family History:  family history includes Cancer in her mother; Ovarian cancer in her mother. Otherwise pertinent FHx was reviewed and unremarkable.     Social History:  reports that she has never smoked. She has never used smokeless tobacco. She reports that she does not drink alcohol and does not use drugs.  Social History     Social History Narrative   • Not on file       Medications:  Available home medication information reviewed.  (Not in a hospital admission)      Allergies   Allergen Reactions   • Carvedilol Hives   • Cozaar [Losartan] Hives   • Hydrochlorothiazide Hives   • Hyzaar [Losartan Potassium-Hctz] Hives       Objective   Objective     Vital Signs:   Temp:  [97.8 °F (36.6 °C)] 97.8 °F (36.6 °C)  Heart Rate:  [89-99] 95  Resp:  [15-16] 16  BP: (146-176)/() 158/104       Physical Exam   Constitutional: Awake, alert  Eyes: PERRLA, sclerae anicteric, no conjunctival injection  HENT: NCAT, mucous membranes moist  Neck: Supple, no thyromegaly, no lymphadenopathy, trachea midline  Respiratory: Clear to auscultation bilaterally, nonlabored respirations   Cardiovascular: RRR, no murmurs, rubs, or gallops, palpable pedal pulses bilaterally  Gastrointestinal: Positive bowel sounds, soft, nontender, nondistended  Musculoskeletal: No bilateral ankle edema, no clubbing or cyanosis to extremities  Psychiatric: Appropriate affect, cooperative  Neurologic: Oriented x 3, strength symmetric in all extremities, Cranial Nerves grossly intact to confrontation, speech clear  Skin: No rashes    Result Review:  I have personally reviewed the results from the time of this admission to 10/21/2021 15:52 EDT and agree with these  findings:  [x]  Laboratory  []  Microbiology  [x]  Radiology  []  EKG/Telemetry   []  Cardiology/Vascular   []  Pathology  []  Old records  []  Other:  Most notable findings include:       LAB RESULTS:      Lab 10/21/21  1321   WBC 8.85   HEMOGLOBIN 15.2   HEMATOCRIT 46.2   PLATELETS 290   NEUTROS ABS 5.02   IMMATURE GRANS (ABS) 0.02   LYMPHS ABS 3.08   MONOS ABS 0.49   EOS ABS 0.18   MCV 90.4         Lab 10/21/21  1321   SODIUM 136   POTASSIUM 4.3   CHLORIDE 100   CO2 24.0   ANION GAP 12.0   BUN 15   CREATININE 0.86   GLUCOSE 435*   CALCIUM 9.8         Lab 10/21/21  1321   TOTAL PROTEIN 7.3   ALBUMIN 4.30   GLOBULIN 3.0   ALT (SGPT) 54*   AST (SGOT) 27   BILIRUBIN 0.4   ALK PHOS 87         Lab 10/21/21  1321   TROPONIN T <0.010                     Microbiology Results (last 10 days)     ** No results found for the last 240 hours. **          MRI Brain With & Without Contrast    Result Date: 10/21/2021  EXAMINATION: MRI BRAIN W WO CONTRAST-  INDICATION: left facial/extremity paresthesias  TECHNIQUE: Multiplanar, multisequence MR imaging of the brain before and after IV gadolinium base contrast.  COMPARISON: NONE  FINDINGS:  There are 3 small foci of acute infarcts, one in the right thalamus and 2 adjacent foci in the right corona radiata/pericallosal white matter (series 2 image 58 and series 2 image 60-61). On postcontrast sequences there is some corresponding peripheral enhancement about the right corona radiata infarct (series 11 image 14). There is evidence of prior right basal ganglia lacunar infarct. There is background subcortical and periventricular white matter FLAIR/T2 hyperintensities which are nonspecific but can be seen in setting of chronic small vessel ischemic change. No acute intracranial hemorrhage. No intracranial mass. The midline structures are unremarkable. Normal size and configuration of the ventricles without evidence of extra-axial collection, midline shift, or herniation. Flow voids and  enhancement of the large intracranial arterial vasculature appear unremarkable on this nondedicated exam. Normal appearance of the orbits. The mastoid air cells and paranasal sinuses are clear. No acute bony findings.       Impression:  Scattered small acute infarcts in the right thalamus and right corona radiata/pericallosal white matter.  Evidence of chronic right basal ganglia lacunar infarct on the background of scattered subcortical and periventricular white matter hyperintensities which may reflect sequelae of chronic small vessel ischemic change.   Findings discussed with Dr. Kelly by Dr. Fields via telephone at 2:50 PM 10/21/2021. Patient endorses left-sided paresthesias and symptoms.   This report was finalized on 10/21/2021 3:12 PM by Moises Fields MD.      XR Chest 1 View    Result Date: 10/21/2021  EXAMINATION: XR CHEST 1 VW-  INDICATION: left side paresthesias  TECHNIQUE: Frontal view of the chest  COMPARISON: NONE  FINDINGS:  The cardiomediastinal silhouette is normal. No focal consolidation. A small 3.0 x 1.8 cm crescentic pleural-based density at the left apex is noted. No pneumothorax. No acute osseous findings.      Impression:  Small crescentic pleural based density at the left apex is indeterminant. Consider CT for further evaluation.  The lungs are clear.  This report was finalized on 10/21/2021 1:59 PM by Moises Fields MD.      Assessment/Plan   Assessment & Plan     Active Hospital Problems    Diagnosis  POA   • **Acute stroke due to ischemia (HCC) [I63.9]  Yes   • Essential hypertension [I10]  Yes   • Hyperlipidemia [E78.5]  Yes     56-year-old female with history of hypertension and hyperlipidemia presented with left-sided numbness found to have acute infarcts in the right thalamus    Right thalamic infarct   -Etiology is unknown, possibly embolic, stroke navigators following  - stroke work-up per protocol, follow-up echo  -Continue aspirin and statin  -PT/OT to  evaluate    Hypertension  -No permissive hypertension    Hyperlipidemia  -Continue statin    History of type 2 diabetes  -Patient stopped taking Metformin since she has lost significant weight  -However has a glucose of 435 on presentation, will start SSI for now, follow-up A1c    DVT prophylaxis: Mechanical:    CODE STATUS: full  There are no questions and answers to display.     Admission Status:  I believe this patient meets INPATIENT status due to CVA.  I feel patient’s risk for adverse outcomes and need for care warrant INPATIENT evaluation and I predict the patient’s care encounter to likely last beyond 2 midnights.    Ayla Reyes MD  10/21/21    Electronically signed by Ayla Reyes MD at 10/21/21 7315

## 2021-10-22 NOTE — CASE MANAGEMENT/SOCIAL WORK
Continued Stay Note  [facility]     Patient Name: Shital Guzmán  MRN: [id number]  Today's Date: 10/22/2021    Admit Date: 10/21/2021     Discharge Plan     Row Name 10/22/21 1527       Plan    Plan home    Patient/Family in Agreement with Plan yes    Plan Comments I met with Mrs. Guzmán at the bedside to discuss her discharge plan. She plans on returning home after this hospitalization. She lives alone in [facility]. OT recommends outpatient therapy. She is agreeable to this. I have sent a referral to our outpatient location at the [address] [place] [address] location. They will call Mrs. Guzmán for scheduling. She has a friend that will transport her home at the time of discharge. She denies having any additional discharge needs.    10/23/2021 PT also recommends outpatient therapy. I have sent this ambulatory referral to the [address] [place] [address] location as well. Information has been placed in the AVS for the patient to schedule her first appointment with them.    Final Discharge Disposition Code 01 - home or self-care               Discharge Codes    No documentation.                     Jeromy Dixon RN

## 2021-10-22 NOTE — CONSULTS
Attempted to see patient for diabetes education, patient being attended by other staff at this time. Will attempt to see prior to d/c . Patient qualifies for the outpatient stroke diabetes class. Will tentatively sign her up for 11/2 @ 1130 via phone call. Will notify patient when we are able to see at bedside. Thank you for this referral.

## 2021-10-22 NOTE — PLAN OF CARE
Goal Outcome Evaluation:  Plan of Care Reviewed With: patient        Progress: improving  Outcome Summary: VSS. Pt is AOx4 on room air. NIH-1 for L sided tingling/numbness. Patient has no complaints of pain. Did complain about being on bedrest, but educated patient on the importance of remaining on bedrest. Fall score is 16, alarm is in place. No other problems at this time.

## 2021-10-22 NOTE — PROGRESS NOTES
[facility] Medicine Services  PROGRESS NOTE    Patient Name: Shital Guzmán  : 1965  MRN: [id number]    Date of Admission: 10/21/2021  Primary Care Physician: Ela Brown PA-C    Subjective   Subjective     CC:  CVA    HPI:  Patient is still having left-sided numbness.  She also reports remittent symptoms with fluttering of her heart and shortness of breath.  She denies any diagnosis of atrial fibrillation.    ROS:  General: denies fevers or chills  CV: denies chest pain  Resp: denies shortness of breath  Abd: denies abd pain, nausea      Objective   Objective     Vital Signs:   Temp:  [97.5 °F (36.4 °C)-97.8 °F (36.6 °C)] 97.6 °F (36.4 °C)  Heart Rate:  [] 98  Resp:  [15-18] 16  BP: (140-176)/() 140/93     Physical Exam:  Constitutional: No acute distress, awake, alert  HENT: NCAT, mucous membranes moist  Respiratory: Clear to auscultation bilaterally, respiratory effort normal   Cardiovascular: RRR, no murmurs, rubs, or gallops  Gastrointestinal: Positive bowel sounds, soft, nontender, nondistended  Musculoskeletal: No bilateral ankle edema  Psychiatric: Appropriate affect, cooperative  Neurologic: Oriented x 3, no notable weakness in upper or lower extremities.  No slurred speech or dysarthria.  Skin: No rashes      Results Reviewed:  LAB RESULTS:      Lab 10/21/21  1321   WBC 8.85   HEMOGLOBIN 15.2   HEMATOCRIT 46.2   PLATELETS 290   NEUTROS ABS 5.02   IMMATURE GRANS (ABS) 0.02   LYMPHS ABS 3.08   MONOS ABS 0.49   EOS ABS 0.18   MCV 90.4         Lab 10/21/21  1321   SODIUM 136   POTASSIUM 4.3   CHLORIDE 100   CO2 24.0   ANION GAP 12.0   BUN 15   CREATININE 0.86   GLUCOSE 435*   CALCIUM 9.8         Lab 10/21/21  1321   TOTAL PROTEIN 7.3   ALBUMIN 4.30   GLOBULIN 3.0   ALT (SGPT) 54*   AST (SGOT) 27   BILIRUBIN 0.4   ALK PHOS 87         Lab 10/21/21  1321   TROPONIN T <0.010                 Brief Urine Lab Results  (Last result in the past 365 days)      Color    Clarity   Blood   Leuk Est   Nitrite   Protein   CREAT   Urine HCG        10/21/21 2111 Yellow   Clear   Small (1+)   Negative   Negative   Negative                 Microbiology Results Abnormal     None          MRI Brain With & Without Contrast    Result Date: 10/21/2021  EXAMINATION: MRI BRAIN W WO CONTRAST-  INDICATION: left facial/extremity paresthesias  TECHNIQUE: Multiplanar, multisequence MR imaging of the brain before and after IV gadolinium base contrast.  COMPARISON: NONE  FINDINGS:  There are 3 small foci of acute infarcts, one in the right thalamus and 2 adjacent foci in the right corona radiata/pericallosal white matter (series 2 image 58 and series 2 image 60-61). On postcontrast sequences there is some corresponding peripheral enhancement about the right corona radiata infarct (series 11 image 14). There is evidence of prior right basal ganglia lacunar infarct. There is background subcortical and periventricular white matter FLAIR/T2 hyperintensities which are nonspecific but can be seen in setting of chronic small vessel ischemic change. No acute intracranial hemorrhage. No intracranial mass. The midline structures are unremarkable. Normal size and configuration of the ventricles without evidence of extra-axial collection, midline shift, or herniation. Flow voids and enhancement of the large intracranial arterial vasculature appear unremarkable on this nondedicated exam. Normal appearance of the orbits. The mastoid air cells and paranasal sinuses are clear. No acute bony findings.       Impression:  Scattered small acute infarcts in the right thalamus and right corona radiata/pericallosal white matter.  Evidence of chronic right basal ganglia lacunar infarct on the background of scattered subcortical and periventricular white matter hyperintensities which may reflect sequelae of chronic small vessel ischemic change.   Findings discussed with Dr. Kelly by Dr. Fields via telephone at 2:50 PM  10/21/2021. Patient endorses left-sided paresthesias and symptoms.   This report was finalized on 10/21/2021 3:12 PM by Moises Fields MD.      XR Chest 1 View    Result Date: 10/21/2021  EXAMINATION: XR CHEST 1 VW-  INDICATION: left side paresthesias  TECHNIQUE: Frontal view of the chest  COMPARISON: NONE  FINDINGS:  The cardiomediastinal silhouette is normal. No focal consolidation. A small 3.0 x 1.8 cm crescentic pleural-based density at the left apex is noted. No pneumothorax. No acute osseous findings.      Impression:  Small crescentic pleural based density at the left apex is indeterminant. Consider CT for further evaluation.  The lungs are clear.  This report was finalized on 10/21/2021 1:59 PM by Moises Fields MD.            I have reviewed the medications:  Scheduled Meds:aspirin, 81 mg, Oral, Daily   Or  aspirin, 300 mg, Rectal, Daily  atorvastatin, 80 mg, Oral, Nightly  clopidogrel, 75 mg, Oral, Daily  senna-docusate sodium, 2 tablet, Oral, BID  sodium chloride, 10 mL, Intravenous, Q12H  sodium chloride, 10 mL, Intravenous, Q12H      Continuous Infusions:   PRN Meds:.senna-docusate sodium **AND** polyethylene glycol **AND** bisacodyl **AND** bisacodyl  •  ondansetron  •  [COMPLETED] Insert peripheral IV **AND** sodium chloride  •  sodium chloride  •  sodium chloride    Assessment/Plan   Assessment & Plan     Active Hospital Problems    Diagnosis  POA   • **Acute stroke due to ischemia (HCC) [I63.9]  Yes   • Essential hypertension [I10]  Yes   • Hyperlipidemia [E78.5]  Yes      Resolved Hospital Problems   No resolved problems to display.          56-year-old female with history of hypertension and hyperlipidemia presented with left-sided numbness found to have acute infarcts in the right thalamus    Right thalamic infarct   -Ports episodes of potation's and shortness of breath, suspect paroxysmal atrial fibrillation.  - MRI of the brain with and without contrast showed scattered small acute infarcts  of the right thalamus and right corona radiata, pericallosal white matter.  Evidence of chronic right basal ganglia lacunar infarct.  -Currently in normal sinus rhythm  -Continue ASA, statin  -Loaded with Plavix.  Continue 75 mg a day for 21 days  -PT/OT  -Echo pending     Hypertension  -Goal systolic blood pressure less than 180.     Hyperlipidemia  -Continue statin     History of type 2 diabetes  - A1c uncontrolled at 11.3.  Likely need additional medications on discharge and close follow-up with PCP.  - Patient stopped taking Metformin since she has lost significant weight  -Start Levemir 10 units every morning  -Continue sliding scale insulin  -Diabetes educator    Cocaine abuse  -Medical dependency to see.     DVT prophylaxis: Mechanical:    DVT prophylaxis:  Mechanical DVT prophylaxis orders are present.       AM-PAC 6 Clicks Score (PT): 20 (10/21/21 2000)    Disposition: I expect the patient to be discharged 1-2 days    CODE STATUS:   Code Status and Medical Interventions:   Ordered at: 10/21/21 1637     Level Of Support Discussed With:    Patient     Code Status:    CPR     Medical Interventions (Level of Support Prior to Arrest):    Full     This patient's problems and plans were partially entered by my partner and updated as appropriate by me 10/22/21. Today is my first day evaluating this patient's active medical problems. I Personally reviewed chart and adjusted note to reflect daily changes in management/clinical condition      Adriana Flanagan MD  10/22/21

## 2021-10-22 NOTE — PLAN OF CARE
Goal Outcome Evaluation:    SLP evaluation completed. Will sign-off as no deficits are identified with speech, language or cognition at this time. Please see note for further details and recommendations.

## 2021-10-22 NOTE — PLAN OF CARE
Goal Outcome Evaluation:  Plan of Care Reviewed With: patient           Outcome Summary: PT deacon complete. Pt. performed bed mobility and sit to stand transfers with modified independence. She ambulated 400' w/no assistive device, contact guard assist. She performed 3 steps w/no handrails, contact guard assist. Gait and stair training limited by fatigue. Pt. demonstrates limited safety awareness w/functional mobility. Recommend home with assist and outpatient PT.

## 2021-10-22 NOTE — PLAN OF CARE
Problem: Adult Inpatient Plan of Care  Goal: Plan of Care Review  Recent Flowsheet Documentation  Taken 10/22/2021 1419 by Juan Carlos Garcia, OT  Progress: (OT eval) no change  Plan of Care Reviewed With: patient  Outcome Summary: Initial OT evaluation completed. Pt presents at baseline for ADL performance, demonstrated independence w/ LB dressing, grooming, toileting, and transfers. Mild LUE weakness and L-sided sensation impairment - able to identify all stimuli presented to LUE w/ vision occluded, coordination intact. OT signing off, please re-consult if needed. Recommend d/c to home w/ OP OT if UE symptoms persist.

## 2021-10-22 NOTE — CONSULTS
"[facility]  CARDIOLOGY CONSULTATION NOTE    Shital Guzmán  : 1965  MRN:[id number]  Home Phone:[phone]    Date of Admission:10/21/2021  Date of Consultation: 10/22/21    PCP: Ela Brown PA-C    IDENTIFICATION: A 56 y.o. female resident of [place], [place]     Chief Complaint   Patient presents with   • Left sided numbness     PROBLEM LIST:   1. Acute right hemispheric CVA 2021  a. Presentation with left sided paraesthesias  b. MRI brain 10/21/21: scattered small acute infarcts in right thalamus, right corona radiata/pericallosal white matter; chronic right basal ganglia lacunar infarct   c. MRA head and neck essentially normal   d. Echo 10/22/21: EF 56-60%, negative saline test, mild AS, mild LVH  2. Hypertension   3. Hyperlipidemia  4. DM2  5. Medical noncompliance  6. Cocaine use      ALLERGIES:   Allergies   Allergen Reactions   • Carvedilol Hives   • Cozaar [Losartan] Hives   • Hydrochlorothiazide Hives   • Hyzaar [Losartan Potassium-Hctz] Hives       HOME MEDICINES:   Current Outpatient Medications   Medication Instructions   • amLODIPine (NORVASC) 10 mg, Oral, Daily   • atorvastatin (LIPITOR) 20 MG tablet 1 daily   • loratadine (CLARITIN) 10 mg, Oral, Daily   • triamcinolone (KENALOG) 0.1 % ointment Used twice daily for 2 weeks and then taper to once daily for 2 weeks.  If itching remains well controlled using every other day as needed.     HPI: Mrs. Guzmán is a 57 y/o female with above noted history whom we are seeing in consultation for acute stroke. She has a history of hypertension, hyperlipidemia and diabetes, however stopped taking all her medicine several  months ago as she thought she \"did not need it\" after losing some weight. She has since gained most of her weight back, however never went back on her medicines.  She presented to the hospital yesterday with a 1 day history of left sided paraesthesias. She reports numbness and tingling " "along her left arm and left side of face and head. She denies any other focal neurological deficits. MRI brain revealed small acute scattered infarcts in right thalamus, right corona radiata and pericallosal white matter as well as old right basal ganglia lacunar infarct. Her BP on arrival was 170s/100 mmHg, her glucose was 435, and her LDL is 199. She also reports doing cocaine occasionally, with last use about a month ago. No tobacco or alcohol use. She denies prior history of MI, CVA, DVT or PE. She denies any chest pain or unusual dyspnea. She has occasional brief palpitations which last only a few seconds. She has been fully vaccinated for Covid.       ROS: All systems have been reviewed and are negative with the exception of those mentioned in the HPI and problem list above.    Surgical History:   Past Surgical History:   Procedure Laterality Date   • D & C HYSTEROSCOPY  09/18/2018    path was benign   • LAPAROSCOPIC APPENDECTOMY  2018   • LAPAROSCOPIC TUBAL LIGATION  1988     Social History:   Social History     Socioeconomic History   • Marital status: Single   Tobacco Use   • Smoking status: Never Smoker   • Smokeless tobacco: Never Used   Vaping Use   • Vaping Use: Never used   Substance and Sexual Activity   • Alcohol use: No   • Drug use: Never   • Sexual activity: Defer       Family History:   Family History   Problem Relation Age of Onset   • Cancer Mother    • Ovarian cancer Mother        Objective     /93 (BP Location: Right arm, Patient Position: Lying)   Pulse 93   Temp 98.5 °F (36.9 °C) (Oral)   Resp 18   Ht 157.5 cm (62\")   Wt 72.6 kg (160 lb)   SpO2 93%   BMI 29.26 kg/m²     Intake/Output Summary (Last 24 hours) at 10/22/2021 1341  Last data filed at 10/22/2021 0425  Gross per 24 hour   Intake --   Output 400 ml   Net -400 ml       PHYSICAL EXAM:  CONSTITUTIONAL: Well nourished, cooperative, in no acute distress  HEENT: Normocephalic, atraumatic, PERRLA, no JVD  CARDIOVASCULAR:  " Regular rhythm and normal rate, no murmur, gallop, rub.   RESPIRATORY: Clear to auscultation, normal respiratory effort, no wheezing, rales or rhonchi  GI: Soft, nontender, normal bowel sounds  EXTREMITIES: No gross deformities, no edema. Peripheral pulses are present and equal bilaterally  SKIN: Warm, dry. No bleeding, bruising or rash  NEUROLOGICAL: No gross focal deficits. Detailed exam per Neuro   PSYCHIATRIC: Normal mood and affect. Behavior is normal     Labs/Diagnostic Data  Results from last 7 days   Lab Units 10/21/21  1321   SODIUM mmol/L 136   POTASSIUM mmol/L 4.3   CHLORIDE mmol/L 100   CO2 mmol/L 24.0   BUN mg/dL 15   CREATININE mg/dL 0.86   GLUCOSE mg/dL 435*   CALCIUM mg/dL 9.8     Results from last 7 days   Lab Units 10/21/21  1321   TROPONIN T ng/mL <0.010     Results from last 7 days   Lab Units 10/22/21  0759 10/21/21  1321   WBC 10*3/mm3 10.28 8.85   HEMOGLOBIN g/dL 14.9 15.2   HEMATOCRIT % 43.9 46.2   PLATELETS 10*3/mm3 287 290         Results from last 7 days   Lab Units 10/22/21  0759   CHOLESTEROL mg/dL 268*   TRIGLYCERIDES mg/dL 142   HDL CHOL mg/dL 43   LDL CHOL mg/dL 199*         Results from last 7 days   Lab Units 10/22/21  0759   HEMOGLOBIN A1C % 11.30*     I personally reviewed the patient's EKG/Telemetry data    Radiology Data:   MRI Brain 10/21/21:  IMPRESSION:     Scattered small acute infarcts in the right thalamus and right corona  radiata/pericallosal white matter.     Evidence of chronic right basal ganglia lacunar infarct on the  background of scattered subcortical and periventricular white matter  hyperintensities which may reflect sequelae of chronic small vessel  ischemic change.     MRA Head and neck 10/22/21:  IMPRESSION:  Essentially normal noncontrast MR angiogram of the head and  neck with no evidence of flow-limiting stenosis, large vessel occlusion  or aneurysmal dilatation.    CXR 10/21/21:  IMPRESSION:     Small crescentic pleural based density at the left apex  is  indeterminant. Consider CT for further evaluation.     The lungs are clear.    Echo 10/22/21:  Interpretation Summary    · Left ventricular ejection fraction appears to be 56 - 60%. Left ventricular systolic function is normal.  · Left ventricular diastolic function is consistent with (grade Ia w/high LAP) impaired relaxation.  · Left ventricular wall thickness is consistent with mild concentric hypertrophy.  · Saline test results are negative for right to left atrial level shunt.  · Mild aortic valve stenosis is present.  · Peak velocity of the flow distal to the aortic valve is 230 cm/s. Aortic valve mean pressure gradient is 12 mmHg.      Current Medications:    aspirin, 81 mg, Oral, Daily   Or  aspirin, 300 mg, Rectal, Daily  atorvastatin, 80 mg, Oral, Nightly  clopidogrel, 75 mg, Oral, Daily  fluconazole, 100 mg, Oral, Q24H  insulin detemir, 10 Units, Subcutaneous, Daily  insulin lispro, 0-9 Units, Subcutaneous, TID AC  senna-docusate sodium, 2 tablet, Oral, BID  sodium chloride, 10 mL, Intravenous, Q12H  sodium chloride, 10 mL, Intravenous, Q12H           Assessment and Plan:     1. Acute right hemispheric infarcts   - MRI showed scattered right hemispheric lacunar infarcts, most likely related to small vessel disease  - agree with risk factor modification and DAPT   - echo with normal LVEF, negative saline study  - unlikely to be cardioembolic, however will rule out occult Afib with cardiac monitor (48 hour monitor and if negative 30 day monitor due to insurance restrictions)     2. Hypertension   - begin Losartan 25mg daily (will monitor while in house for allergic reaction listed to previous combination drug Losartan/HCTZ)    3. Hyperlipidemia  - , agree with Lipitor 80mg nightly     4. DM2  - A1C 11%  - per hospitalists     5. Cocaine use   - discussed complete abstinence       Scribed for Faraz Chu MD by Ana Mcnally PA-C. 10/22/2021  14:20 EDT

## 2021-10-22 NOTE — THERAPY DISCHARGE NOTE
Acute Care - Speech Language Pathology Initial Evaluation/Discharge  [facility]   Cognitive-Communication Evaluation       Patient Name: Shital Guzmán  : 1965  MRN: [id number]  Today's Date: 10/22/2021               Admit Date: 10/21/2021     Visit Dx:    ICD-10-CM ICD-9-CM   1. Acute stroke due to ischemia (HCC)  I63.9 434.91   2. Left sided numbness  R20.0 782.0   3. Hyperglycemia  R73.9 790.29   4. Essential hypertension  I10 401.9     Patient Active Problem List   Diagnosis   • Essential hypertension   • Annual GYN exam w/o problems   • Hyperlipidemia   • Intramural and subserous leiomyoma of uterus   • Mirena   • Acute stroke due to ischemia (HCC)   • Essential hypertension     Past Medical History:   Diagnosis Date   • Anemia    • Colon polyp    • Essential hypertension    • GERD (gastroesophageal reflux disease)    • H/O gonorrhea 10/2016   • High cholesterol    • Intramural and subserous leiomyoma of uterus    • Uterine fibroid      Past Surgical History:   Procedure Laterality Date   • D & C HYSTEROSCOPY  2018    path was benign   • LAPAROSCOPIC APPENDECTOMY     • LAPAROSCOPIC TUBAL LIGATION         SLP Recommendation and Plan  SLP Diagnosis: No deficits are identified with speech, language or cognition at this time (10/22/21 1000)     [facility] [place] Criteria for Skilled Therapy Interventions Met: no problems identified which require skilled intervention (10/22/21 1000)  Anticipated Discharge Disposition (SLP): home (10/22/21 1000)                    Reason for Discharge: all goals and outcomes met, no further needs identified (10/22/21 1000)         SLP EVALUATION (last 72 hours)     SLP SLC Evaluation     Row Name 10/22/21 1000                   Communication Assessment/Intervention    Document Type discharge evaluation/summary  -CH        Subjective Information no complaints  -CH        Patient Observations alert; cooperative; agree to therapy  -         Patient/Family/Caregiver Comments/Observations No family present  -        Care Plan Review evaluation/treatment results reviewed; care plan/treatment goals reviewed; risks/benefits reviewed; current/potential barriers reviewed; patient/other agree to care plan  -        Patient Effort good  -        Symptoms Noted During/After Treatment none  -                  General Information    Patient Profile Reviewed yes  -        Pertinent History Of Current Problem Patient admitted w L numbness/tingling, weakness. MRI: R thalamic infarct w exidence f chronic R basal ganglia lacunar infarct. SLC evaluation completed per stroke protocol. NIH currently 1.  -        Precautions/Limitations, Vision WFL; for purposes of eval  -CH        Precautions/Limitations, Hearing WFL; for purposes of eval  -CH        Prior Level of Function-Communication WFL  -        Plans/Goals Discussed with patient; agreed upon  -        Barriers to Rehab none identified  -        Patient's Goals for Discharge return to home  -                  Pain    Additional Documentation Pain Scale: FACES Pre/Post-Treatment (Group)  -                  Pain Scale: FACES Pre/Post-Treatment    Pain: FACES Scale, Pretreatment 0-->no hurt  -        Posttreatment Pain Rating 0-->no hurt  -                  Comprehension Assessment/Intervention    Comprehension Assessment/Intervention Auditory Comprehension; Reading Comprehension  -                  Auditory Comprehension Assessment/Intervention    Auditory Comprehension (Communication) WNL  -        Able to Identify Objects/Pictures (Communication) familiar objects; WNL  -        Answers Questions (Communication) yes/no;  questions; personal; simple; concrete; abstract; WFL  -        Able to Follow Commands (Communication) 3-step; L  -        Narrative Discourse conversational level; WFL  -                  Reading Comprehension Assessment/Intervention    Reading Comprehension  (Communication) WNL  -        Functional Reading Tasks WNL  -                  Expression Assessment/Intervention    Expression Assessment/Intervention verbal expression; graphic expression  -CH                  Verbal Expression Assessment/Intervention    Verbal Expression WNL  -        Automatic Speech (Communication) response to greeting; counting 1-20; WNL  -        Repetition sentences; WNL  -        Phrase Completion unpredictable; WNL  -        Responsive Naming complex; WNL  -        Confrontational Naming low frequency; WNL  -        Spontaneous/Functional Words complex; WNL  -        Sentence Formulation complex; WNL  -        Conversational Discourse/Fluency WN  -                  Graphic Expression Assessment/Intervention    Graphic Expression WNL  -        Biographical Information name; WNL  -                  Oral Motor Structure and Function    Oral Motor Structure and Function WNL  -                  Motor Speech Assessment/Intervention    Motor Speech Function WNL  -                  Cursory Voice Assessment/Intervention    Quality and Resonance (Voice) WNL  -                  Cognitive Assessment Intervention- SLP    Cognitive Function (Cognition) WNL  -        Orientation Status (Cognition) awareness of basic personal information; person; place; time; situation; WNL  -        Memory (Cognitive) simple; functional; immediate; short-term; long-term; delayed; [facility]  -        Attention (Cognitive) selective; sustained; attention to detail; WNL  -        Thought Organization (Cognitive) concrete divergent; abstract divergent; concrete convergent; abstract convergent; verbal sequencing; [facility]  -        Reasoning (Cognitive) simple; deductive; [facility]  -        Problem Solving (Cognitive) simple; temporal; [facility]  -        Functional Math (Cognitive) simple; word problems; [facility]  -        Executive Function (Cognition) WNL  -        Pragmatics (Communication) WNL   -CH        Right Hemisphere Function WNL  -                  SLP Clinical Impressions    SLP Diagnosis No deficits are identified with speech, language or cognition at this time  -        SLC Criteria for Skilled Therapy Interventions Met no problems identified which require skilled intervention  -CH        Functional Impact no impact on function  -                  Recommendations    Therapy Frequency (SLP SLC) evaluation only  -CH        Anticipated Discharge Disposition (SLP) home  -                  SLP Discharge Summary    Discharge Destination unknown  -        Discharge Diagnostic Statement No deficits are identified with speech, language or cognition at this time  -CH        Progress Toward Achieving Short/long Term Goals discharge on same date as initial evaluation  -        Reason for Discharge all goals and outcomes met, no further needs identified  -              User Key  (r) = Recorded By, (t) = Taken By, (c) = Cosigned By    Initials Name Effective Dates    Lydia Melendez MS CCC-SLP 06/16/21 -                Section K & L                   EDUCATION  The patient has been educated in the following areas:   Cognitive Impairment Communication Impairment.                  Time Calculation:    Time Calculation- SLP     Row Name 10/22/21 1046             Time Calculation- [facility]    SLP Start Time 1000  -CH      SLP Received On 10/22/21  -              Untimed Charges    SLP Eval/Re-eval  ST Eval Speech and Production w/ Language - 91918  -      [id number] Eval Speech and Production w/ Language Minutes 42  -CH              Total Minutes    Untimed Charges Total Minutes 42  -CH       Total Minutes 42  -CH            User Key  (r) = Recorded By, (t) = Taken By, (c) = Cosigned By    Initials Name Provider Type    Lydia Melendez MS CCC-SLP Speech and Language Pathologist                Therapy Charges for Today     Code Description Service Date Service Provider Modifiers Qty     [id number]  ST EVAL SPEECH AND PROD W LANG  3 10/22/2021 Lydia Roldan, MS CCC-SLP GN 1                   SLP Discharge Summary  Anticipated Discharge Disposition (SLP): home  Reason for Discharge: all goals and outcomes met, no further needs identified  Progress Toward Achieving Short/long Term Goals: discharge on same date as initial evaluation  Discharge Destination: unknown    Lydia RoldanMS CAMPBELL-SLP  10/22/2021     Patient was not wearing a face mask and did not exhibit coughing during this therapy encounter.  Procedure performed was aerosolizing, involved close contact (within 6 feet for at least 15 minutes or longer), and did not involve contact with infectious secretions or specimens.  Therapist used appropriate personal protective equipment including gloves, standard procedure mask and eye protection.  Appropriate PPE was worn during the entire therapy session.  Hand hygiene was completed before and after therapy session.

## 2021-10-22 NOTE — THERAPY EVALUATION
Patient Name: Shital Guzmán  : 1965    MRN: [id number]                              Today's Date: 10/22/2021       Admit Date: 10/21/2021    Visit Dx:     ICD-10-CM ICD-9-CM   1. Acute stroke due to ischemia (HCC)  I63.9 434.91   2. Left sided numbness  R20.0 782.0   3. Hyperglycemia  R73.9 790.29   4. Essential hypertension  I10 401.9     Patient Active Problem List   Diagnosis   • Essential hypertension   • Annual GYN exam w/o problems   • Hyperlipidemia   • Intramural and subserous leiomyoma of uterus   • Mirena   • Acute stroke due to ischemia (HCC)   • Essential hypertension     Past Medical History:   Diagnosis Date   • Anemia    • Colon polyp    • Essential hypertension    • GERD (gastroesophageal reflux disease)    • H/O gonorrhea 10/2016   • High cholesterol    • Intramural and subserous leiomyoma of uterus    • Uterine fibroid      Past Surgical History:   Procedure Laterality Date   • D & C HYSTEROSCOPY  2018    path was benign   • LAPAROSCOPIC APPENDECTOMY     • LAPAROSCOPIC TUBAL LIGATION        General Information     Row Name 10/22/21 1521          Physical Therapy Time and Intention    Document Type evaluation  -SS     Mode of Treatment physical therapy  -     Row Name 10/22/21 1521          General Information    Patient Profile Reviewed yes  -SS     Prior Level of Function independent:; all household mobility; community mobility; gait; transfer; ADL's; bed mobility; driving; using stairs  -     Existing Precautions/Restrictions fall  L sided sensory deficits  -     Barriers to Rehab medically complex; impaired sensation; ineffective coping  -     Row Name 10/22/21 1521          Living Environment    Lives With alone  -     Row Name 10/22/21 1521          Home Main Entrance    Number of Stairs, Main Entrance three  -SS     Stair Railings, Main Entrance none  -SS     Row Name 10/22/21 1521          Stairs Within Home, Primary    Number of Stairs,  Within Home, Primary none  -SS     Row Name 10/22/21 1521          Cognition    Orientation Status (Cognition) oriented x 4  -SS     Row Name 10/22/21 1521          Safety Issues, Functional Mobility    Safety Issues Affecting Function (Mobility) awareness of need for assistance; impulsivity; insight into deficits/self-awareness; safety precaution awareness; safety precautions follow-through/compliance; sequencing abilities  -     Impairments Affecting Function (Mobility) sensation/sensory awareness; balance; endurance/activity tolerance; strength  -SS           User Key  (r) = Recorded By, (t) = Taken By, (c) = Cosigned By    Initials Name Provider Type     Sharron Flowers, PT Physical Therapist               Mobility     Row Name 10/22/21 1523          Bed Mobility    Bed Mobility supine-sit; scooting/bridging  -     Scooting/Bridging [place] (Bed Mobility) modified independence  -SS     Supine-Sit [place] (Bed Mobility) modified independence  -     Assistive Device (Bed Mobility) bed rails; head of bed elevated  -     Comment (Bed Mobility) Pt. demonstrates appropriate sequencing, asymptomatic; limited safety awareness  -     Row Name 10/22/21 1523          Transfers    Comment (Transfers) VC for hand placement, lowering with eccentric control  -     Row Name 10/22/21 1523          Sit-Stand Transfer    Sit-Stand [place] (Transfers) modified independence  -     Assistive Device (Sit-Stand Transfers) other (see comments)  none  -SS     Row Name 10/22/21 1523          Gait/Stairs (Locomotion)    [place] Level (Gait) contact guard; verbal cues  -     Assistive Device (Gait) other (see comments)  none  -SS     Distance in Feet (Gait) 400  -SS     Deviations/Abnormal Patterns (Gait) bilateral deviations; other (see comments)  gait speed increased, limited safety awareness  -SS     Bilateral Gait Deviations forward flexed posture  -SS     [place] Level (Stairs) contact guard   -SS     Assistive Device (Stairs) --  none  -SS     Handrail Location (Stairs) none  -SS     Number of Steps (Stairs) 3  -SS     Ascending Technique (Stairs) step-to-step  -SS     Descending Technique (Stairs) step-to-step  -SS     Comment (Gait/Stairs) Pt. ambulated with a step through gait pattern at an increased gait speed. Pt. demonstrates some limited safety awareness w/ambulation. VC for safety recommendations - no overt LOB w/head turns, turn 360. Pt. performed 3 steps w/no hand rail, VC for safety recommendations. Gait and stair training limited by fatigue.  -SS           User Key  (r) = Recorded By, (t) = Taken By, (c) = Cosigned By    Initials Name Provider Type    SS Sharron Flowers PT Physical Therapist               Obj/Interventions     Row Name 10/22/21 1530          Range of Motion Comprehensive    General Range of Motion bilateral lower extremity ROM WFL  -SS     Row Name 10/22/21 1530          Strength Comprehensive (MMT)    Comment, General Manual Muscle Testing (MMT) Assessment RLE 4/5, LLE 4-/5  -SS     Row Name 10/22/21 1530          Motor Skills    Motor Skills coordination  -     Coordination gross motor deficit; left; lower extremity; heel to shin; minimal impairment  very minimal  -     Row Name 10/22/21 1530          Balance    Balance Assessment sitting static balance; sitting dynamic balance; standing static balance; standing dynamic balance  -SS     Static Sitting Balance WFL; unsupported; sitting, edge of bed  -SS     Dynamic Sitting Balance WFL; unsupported; sitting, edge of bed  -SS     Static Standing Balance WFL; unsupported  -SS     Dynamic Standing Balance WFL; unsupported  -SS     Balance Interventions sitting; sit to stand; supported; static; dynamic; standing  -     Row Name 10/22/21 1530          Sensory Assessment (Somatosensory)    Sensory Assessment (Somatosensory) LE sensation intact  slightly diminished on LLE compared to R  -SS           User Key  (r) = Recorded  By, (t) = Taken By, (c) = Cosigned By    Initials Name Provider Type    SS Sharron Flowers, PT Physical Therapist               Goals/Plan     Row Name 10/22/21 1538          Bed Mobility Goal 1 (PT)    Activity/Assistive Device (Bed Mobility Goal 1, PT) bed mobility activities, all  -SS     [place] Level/Cues Needed (Bed Mobility Goal 1, PT) independent  -SS     Time Frame (Bed Mobility Goal 1, PT) long term goal (LTG); 10 days  -SS     Row Name 10/22/21 1538          Transfer Goal 1 (PT)    Activity/Assistive Device (Transfer Goal 1, PT) transfers, all; sit-to-stand/stand-to-sit; bed-to-chair/chair-to-bed  -SS     [place] Level/Cues Needed (Transfer Goal 1, PT) independent  -SS     Time Frame (Transfer Goal 1, PT) long term goal (LTG); 10 days  -SS     Row Name 10/22/21 1538          Gait Training Goal 1 (PT)    Activity/Assistive Device (Gait Training Goal 1, PT) gait (walking locomotion)  -SS     [place] Level (Gait Training Goal 1, PT) independent  -SS     Distance (Gait Training Goal 1, PT) 600  -SS     Time Frame (Gait Training Goal 1, PT) long term goal (LTG); 10 days  -     Row Name 10/22/21 1538          Stairs Goal 1 (PT)    Activity/Assistive Device (Stairs Goal 1, PT) stairs, all skills  -SS     [place] Level/Cues Needed (Stairs Goal 1, PT) independent  -SS     Number of Stairs (Stairs Goal 1, PT) 3  -SS     Time Frame (Stairs Goal 1, PT) long term goal (LTG); 10 days  -           User Key  (r) = Recorded By, (t) = Taken By, (c) = Cosigned By    Initials Name Provider Type    SS Sharron Flowers, PT Physical Therapist               Clinical Impression     Row Name 10/22/21 1535          Pain    Additional Documentation Pain Scale: Numbers Pre/Post-Treatment (Group)  -     Row Name 10/22/21 1535          Pain Scale: Numbers Pre/Post-Treatment    Pretreatment Pain Rating 0/10 - no pain  -SS     Posttreatment Pain Rating 0/10 - no pain  -SS     Pain Intervention(s) Repositioned;  Ambulation/increased activity  -     Row Name 10/22/21 1535          Plan of Care Review    Plan of Care Reviewed With patient  -SS     Outcome Summary PT eval complete. Pt. performed bed mobility and sit to stand transfers with modified independence. She ambulated 400' w/no assistive device, contact guard assist. She performed 3 steps w/no handrails, contact guard assist. Gait and stair training limited by fatigue. Pt. demonstrates limited safety awareness w/functional mobility. Recommend home with assist and outpatient PT.  -     Row Name 10/22/21 1535          Therapy Assessment/Plan (PT)    Rehab Potential (PT) good, to achieve stated therapy goals  -     Criteria for Skilled Interventions Met (PT) yes; meets criteria; skilled treatment is necessary  -     Row Name 10/22/21 1535          Vital Signs    Pre Systolic BP Rehab 149  -SS     Pre Treatment Diastolic BP 99  -SS     Post Systolic BP Rehab 154  -SS     Post Treatment Diastolic   -SS     Pretreatment Heart Rate (beats/min) 99  -SS     Intratreatment Heart Rate (beats/min) 113  -SS     Posttreatment Heart Rate (beats/min) 93  -SS     Pre SpO2 (%) 94  -SS     O2 Delivery Pre Treatment room air  -SS     Post SpO2 (%) 94  -SS     O2 Delivery Post Treatment room air  -SS     Pre Patient Position Supine  -SS     Intra Patient Position Standing  -SS     Post Patient Position Sitting  -SS     Row Name 10/22/21 1535          Positioning and Restraints    Pre-Treatment Position in bed  -SS     Post Treatment Position chair  -SS     In Chair notified nsg; reclined; call light within reach; encouraged to call for assist; exit alarm on; legs elevated  -SS           User Key  (r) = Recorded By, (t) = Taken By, (c) = Cosigned By    Initials Name Provider Type    SS Sharron Flowers, PT Physical Therapist               Outcome Measures     Row Name 10/22/21 1540 10/22/21 0800       How much help from another person do you currently need...    Turning from  your back to your side while in flat bed without using bedrails? 4  -SS 4  -CC    Moving from lying on back to sitting on the side of a flat bed without bedrails? 4  -SS 4  -CC    Moving to and from a bed to a chair (including a wheelchair)? 4  -SS 3  -CC    Standing up from a chair using your arms (e.g., wheelchair, bedside chair)? 4  -SS 3  -CC    Climbing 3-5 steps with a railing? 3  -SS 3  -CC    To walk in hospital room? 3  -SS 3  -CC    AM-PAC 6 Clicks Score (PT) 22  -SS 20  -CC    Row Name 10/22/21 1540 10/22/21 1423       Modified [place] Scale    Pre-Stroke Modified [place] Scale -- 6 - Unable to determine (UTD) from the medical record documentation  -    Modified [place] Scale 1 - No significant disability despite symptoms.  Able to carry out all usual duties and activities.  -SS 1 - No significant disability despite symptoms.  Able to carry out all usual duties and activities.  -    Row Name 10/22/21 1540 10/22/21 1423       Functional Assessment    Outcome Measure Options AM-PAC 6 Clicks Basic Mobility (PT); Modified [place]  -SS AM-PAC 6 Clicks Daily Activity (OT); Modified [place]  -CS          User Key  (r) = Recorded By, (t) = Taken By, (c) = Cosigned By    Initials Name Provider Type    CC Lucila Christy, RN Registered Nurse    Juan Carlos Degroot, OT Occupational Therapist    Sharron Jean Baptiste, PT Physical Therapist                             Physical Therapy Education                 Title: PT OT SLP Therapies (In Progress)     Topic: Physical Therapy (Done)     Point: Mobility training (Done)     Learning Progress Summary           Patient DAYNA Cazares, VU,NR by  at 10/22/2021 1541    Comment: Educated pt. safety/technique with transfers, ambulation, stair training, bed mobility, PT POC                   Point: Body mechanics (Done)     Learning Progress Summary           Patient DAYNA Cazares, VU,NR by  at 10/22/2021 1541    Comment: Educated pt. safety/technique with transfers, ambulation,  stair training, bed mobility, PT POC                   Point: Precautions (Done)     Learning Progress Summary           Patient DAYNA Cazares VU,NR by  at 10/22/2021 1541    Comment: Educated pt. safety/technique with transfers, ambulation, stair training, bed mobility, PT POC                               User Key     Initials Effective Dates Name Provider Type Discipline     06/01/21 -  Sharron Flowers, PT Physical Therapist PT              PT Recommendation and Plan  Planned Therapy Interventions (PT): balance training, bed mobility training, gait training, home exercise program, neuromuscular re-education, patient/family education, postural re-education, stair training, strengthening, transfer training  Plan of Care Reviewed With: patient  Outcome Summary: PT eval complete. Pt. performed bed mobility and sit to stand transfers with modified independence. She ambulated 400' w/no assistive device, contact guard assist. She performed 3 steps w/no handrails, contact guard assist. Gait and stair training limited by fatigue. Pt. demonstrates limited safety awareness w/functional mobility. Recommend home with assist and outpatient PT.     Time Calculation:    PT Charges     Row Name 10/22/21 1542             Time Calculation    Start Time 1312  -SS      Stop Time 1340  -SS      Time Calculation (min) 28 min  -SS      PT Received On 10/22/21  -      PT Goal Re-Cert Due Date 11/01/21  -              Timed Charges    67136 - Gait Training Minutes  10  -SS              Untimed Charges    PT Eval/Re-eval Minutes 40  -SS              Total Minutes    Timed Charges Total Minutes 10  -SS      Untimed Charges Total Minutes 40  -SS       Total Minutes 50  -SS            User Key  (r) = Recorded By, (t) = Taken By, (c) = Cosigned By    Initials Name Provider Type     Sharron Flowers, PT Physical Therapist              Therapy Charges for Today     Code Description Service Date Service Provider Modifiers Qty    [id number]   GAIT TRAINING EA 15 MIN 10/22/2021 Sharron Flowers, PT GP 1    [id number] HC PT EVAL LOW COMPLEXITY 3 10/22/2021 Sharron Flowers, PT GP 1          PT G-Codes  Outcome Measure Options: AM-PAC 6 Clicks Basic Mobility (PT), Modified [place]  AM-PAC 6 Clicks Score (PT): 22  AM-PAC 6 Clicks Score (OT): 23  Modified [place] Scale: 1 - No significant disability despite symptoms.  Able to carry out all usual duties and activities.    Sharron Flowers, LAY  10/22/2021

## 2021-10-22 NOTE — THERAPY DISCHARGE NOTE
Patient Name: Shital Guzmán  : 1965    MRN: [id number]                              Today's Date: 10/22/2021       Admit Date: 10/21/2021    Visit Dx:     ICD-10-CM ICD-9-CM   1. Acute stroke due to ischemia (HCC)  I63.9 434.91   2. Left sided numbness  R20.0 782.0   3. Hyperglycemia  R73.9 790.29   4. Essential hypertension  I10 401.9     Patient Active Problem List   Diagnosis   • Essential hypertension   • Annual GYN exam w/o problems   • Hyperlipidemia   • Intramural and subserous leiomyoma of uterus   • Mirena   • Acute stroke due to ischemia (HCC)   • Essential hypertension     Past Medical History:   Diagnosis Date   • Anemia    • Colon polyp    • Essential hypertension    • GERD (gastroesophageal reflux disease)    • H/O gonorrhea 10/2016   • High cholesterol    • Intramural and subserous leiomyoma of uterus    • Uterine fibroid      Past Surgical History:   Procedure Laterality Date   • D & C HYSTEROSCOPY  2018    path was benign   • LAPAROSCOPIC APPENDECTOMY     • LAPAROSCOPIC TUBAL LIGATION        General Information     Row Name 10/22/21 1411          OT Time and Intention    Document Type discharge evaluation/summary  -CS     Mode of Treatment occupational therapy  -CS     Row Name 10/22/21 141          General Information    Patient Profile Reviewed yes  -CS     Prior Level of Function independent:; all household mobility; bed mobility; ADL's; home management; driving  -CS     Existing Precautions/Restrictions fall  L-sided sensory deficits  -CS     Barriers to Rehab medically complex; ineffective coping; impaired sensation  -CS     Row Name 10/22/21 1411          Living Environment    Lives With alone  -CS     Row Name 10/22/21 1411          Home Main Entrance    Number of Stairs, Main Entrance two  -CS     Stair Railings, Main Entrance none  -CS     Row Name 10/22/21 1411          Stairs Within Home, Primary    Stairs, Within Home, Primary One level, walk-in  shower, bath seating reported  -CS     Number of Stairs, Within Home, Primary none  -CS     Row Name 10/22/21 1411          Cognition    Orientation Status (Cognition) oriented x 3  -CS     Row Name 10/22/21 1411          Safety Issues, Functional Mobility    Safety Issues Affecting Function (Mobility) insight into deficits/self-awareness; safety precaution awareness; safety precautions follow-through/compliance  -CS     Impairments Affecting Function (Mobility) sensation/sensory awareness  -CS           User Key  (r) = Recorded By, (t) = Taken By, (c) = Cosigned By    Initials Name Provider Type    CS Juan Carlos Garcia OT Occupational Therapist                 Mobility/ADL's     Row Name 10/22/21 1415          Bed Mobility    Bed Mobility supine-sit; scooting/bridging  -CS     Scooting/Bridging [place] (Bed Mobility) independent  -CS     Supine-Sit [place] (Bed Mobility) independent  -CS     Assistive Device (Bed Mobility) head of bed elevated  -CS     Comment (Bed Mobility) appropriate sequencing, good balance upon sitting, no dizziness reported  -CS     Row Name 10/22/21 1415          Transfers    Transfers sit-stand transfer  -CS     Comment (Transfers) no AD, no LOB  -CS     Sit-Stand [place] (Transfers) independent  -CS     Row Name 10/22/21 1415          Activities of Daily Living    BADL Assessment/Intervention lower body dressing; grooming; toileting; feeding  -CS     Row Name 10/22/21 1415          Lower Body Dressing Assessment/Training    [place] Level (Lower Body Dressing) don; socks; independent  -CS     Position (Lower Body Dressing) edge of bed sitting; unsupported standing  -CS     Comment (Lower Body Dressing) leaned over in unsupported standing to adjust socks, good balance  -CS     Row Name 10/22/21 1415          Grooming Assessment/Training    [place] Level (Grooming) hair care, combing/brushing; independent  -CS     Position (Grooming) sink side; unsupported sitting   -CS     Comment (Grooming) put hair up in bun, bimanual skills intact  -CS     Row Name 10/22/21 1415          Self-Feeding Assessment/Training    [place] Level (Feeding) liquids to mouth; independent  -CS     Position (Self-Feeding) supported sitting  -     Row Name 10/22/21 1415          Toileting Assessment/Training    [place] Level (Toileting) adjust/manage clothing; perform perineal hygiene; independent  -CS           User Key  (r) = Recorded By, (t) = Taken By, (c) = Cosigned By    Initials Name Provider Type     Juan Carlos Garcia, OT Occupational Therapist               Obj/Interventions     Row Name 10/22/21 1417          Sensory Assessment (Somatosensory)    Sensory Assessment (Somatosensory) UE sensation intact  -     Row Name 10/22/21 1417          Vision Assessment/Intervention    Visual Impairment/Limitations WFL; corrective lenses full-time  -     Row Name 10/22/21 1417          Range of Motion Comprehensive    General Range of Motion no range of motion deficits identified  -     Row Name 10/22/21 1417          Strength Comprehensive (MMT)    General Manual Muscle Testing (MMT) Assessment upper extremity strength deficits identified  -     Comment, General Manual Muscle Testing (MMT) Assessment RUE grossly 5/5, LUE grossly 4+/5 - WFL for safe ADL performance and transfers  -     Row Name 10/22/21 1417          Motor Skills    Motor Skills coordination; functional endurance  -     Coordination bilateral; upper extremity; bimanual skills; WFL  -     Functional Endurance O2 sats WBL on RA throughout activities  -     Row Name 10/22/21 1417          Balance    Balance Assessment sitting static balance; sitting dynamic balance; standing static balance; standing dynamic balance  -     Static Sitting Balance WNL; sitting, edge of bed  -     Dynamic Sitting Balance WNL; sitting, edge of bed  -     Static Standing Balance WNL; unsupported; standing  -     Dynamic Standing  Balance WFL; unsupported; standing  -           User Key  (r) = Recorded By, (t) = Taken By, (c) = Cosigned By    Initials Name Provider Type    Juan Carlos Degroot, OT Occupational Therapist               Goals/Plan    No documentation.                Clinical Impression     Row Name 10/22/21 1419          Pain Assessment    Additional Documentation Pain Scale: FACES Pre/Post-Treatment (Group)  -     Row Name 10/22/21 1419          Pain Scale: FACES Pre/Post-Treatment    Pain: FACES Scale, Pretreatment 0-->no hurt  -CS     Posttreatment Pain Rating 0-->no hurt  -CS     Pre/Posttreatment Pain Comment tolerated eval  -CS     Row Name 10/22/21 1419          Plan of Care Review    Plan of Care Reviewed With patient  -CS     Progress no change  OT eval  -CS     Outcome Summary Initial OT evaluation completed. Pt presents at baseline for ADL performance, demonstrated independence w/ LB dressing, grooming, toileting, and transfers. Mild LUE weakness and L-sided sensation impairment - able to identify all stimuli presented to LUE w/ vision occluded, coordination intact. OT signing off, please re-consult if needed. Recommend d/c to home w/ OP OT if UE symptoms persist.  -     Row Name 10/22/21 1419          Therapy Assessment/Plan (OT)    Criteria for Skilled Therapeutic Interventions Met (OT) no; no problems identified which require skilled intervention  -     Row Name 10/22/21 1419          Therapy Plan Review/Discharge Plan (OT)    Anticipated Discharge Disposition (OT) home; home with outpatient therapy services  -     Row Name 10/22/21 1419          Vital Signs    Pre Systolic BP Rehab 154  RN cleared for eval VSS on RA  -CS     Pre Treatment Diastolic   -CS     Post Systolic BP Rehab 149  -CS     Post Treatment Diastolic BP 99  -CS     O2 Delivery Pre Treatment room air  -CS     O2 Delivery Intra Treatment room air  -CS     O2 Delivery Post Treatment room air  -CS     Pre Patient Position Supine  -CS      Intra Patient Position Standing  -CS     Post Patient Position Sitting  -CS     Row Name 10/22/21 1419          Positioning and Restraints    Pre-Treatment Position in bed  -CS     Post Treatment Position chair  -CS     In Chair notified nsg; reclined; sitting; call light within reach; encouraged to call for assist; exit alarm on; legs elevated  -CS           User Key  (r) = Recorded By, (t) = Taken By, (c) = Cosigned By    Initials Name Provider Type    CS Juan Carlos Garcia, OT Occupational Therapist               Outcome Measures     Row Name 10/22/21 1423          How much help from another is currently needed...    Putting on and taking off regular lower body clothing? 4  -CS     Bathing (including washing, rinsing, and drying) 3  -CS     Toileting (which includes using toilet bed pan or urinal) 4  -CS     Putting on and taking off regular upper body clothing 4  -CS     Taking care of personal grooming (such as brushing teeth) 4  -CS     Eating meals 4  -CS     AM-PAC 6 Clicks Score (OT) 23  -CS     Row Name 10/22/21 0800          How much help from another person do you currently need...    Turning from your back to your side while in flat bed without using bedrails? 4  -CC     Moving from lying on back to sitting on the side of a flat bed without bedrails? 4  -CC     Moving to and from a bed to a chair (including a wheelchair)? 3  -CC     Standing up from a chair using your arms (e.g., wheelchair, bedside chair)? 3  -CC     Climbing 3-5 steps with a railing? 3  -CC     To walk in hospital room? 3  -CC     AM-PAC 6 Clicks Score (PT) 20  -CC     Row Name 10/22/21 1423          Modified Reynaldo Scale    Pre-Stroke Modified Saline Scale 6 - Unable to determine (UTD) from the medical record documentation  -CS     Modified Saline Scale 1 - No significant disability despite symptoms.  Able to carry out all usual duties and activities.  -     Row Name 10/22/21 1423          Functional Assessment    Outcome Measure  Options AM-PAC 6 Clicks Daily Activity (OT); Modified [place]  -           User Key  (r) = Recorded By, (t) = Taken By, (c) = Cosigned By    Initials Name Provider Type    CC Lucila Christy, RN Registered Nurse    Juan Carlos Degroot, OT Occupational Therapist                Occupational Therapy Education                 Title: PT OT SLP Therapies (In Progress)     Topic: Occupational Therapy (In Progress)     Point: ADL training (Not Started)     Description:   Instruct learner(s) on proper safety adaptation and remediation techniques during self care or transfers.   Instruct in proper use of assistive devices.              Learner Progress:  Not documented in this visit.          Point: Home exercise program (Not Started)     Description:   Instruct learner(s) on appropriate technique for monitoring, assisting and/or progressing therapeutic exercises/activities.              Learner Progress:  Not documented in this visit.          Point: Precautions (Done)     Description:   Instruct learner(s) on prescribed precautions during self-care and functional transfers.              Learning Progress Summary           Patient Acceptance, E,D, VU,DU by  at 10/22/2021 1425                   Point: Body mechanics (Done)     Description:   Instruct learner(s) on proper positioning and spine alignment during self-care, functional mobility activities and/or exercises.              Learning Progress Summary           Patient Acceptance, E,D, VU,DU by  at 10/22/2021 1425                               User Key     Initials Effective Dates Name Provider Type Discipline     06/16/21 -  Juan Carlos Garcia, OT Occupational Therapist OT              OT Recommendation and Plan     Plan of Care Review  Plan of Care Reviewed With: patient  Progress: no change (OT eval)  Outcome Summary: Initial OT evaluation completed. Pt presents at baseline for ADL performance, demonstrated independence w/ LB dressing, grooming, toileting,  and transfers. Mild LUE weakness and L-sided sensation impairment - able to identify all stimuli presented to LUE w/ vision occluded, coordination intact. OT signing off, please re-consult if needed. Recommend d/c to home w/ OP OT if UE symptoms persist.     Time Calculation:    Time Calculation- OT     Row Name 10/22/21 1425             Time Calculation- OT    OT Start Time 1300  -CS      OT Received On 10/22/21  -CS              Untimed Charges    OT Eval/Re-eval Minutes 42  -CS              Total Minutes    Untimed Charges Total Minutes 42  -CS       Total Minutes 42  -CS            User Key  (r) = Recorded By, (t) = Taken By, (c) = Cosigned By    Initials Name Provider Type    CS Juan Carlos Garcia, OT Occupational Therapist              Therapy Charges for Today     Code Description Service Date Service Provider Modifiers Qty    [id number] HC OT EVAL LOW COMPLEXITY 3 10/22/2021 Juan Carlos Garcia OT GO 1               Juan Carlos Garcia OT  10/22/2021

## 2021-10-23 ENCOUNTER — READMISSION MANAGEMENT (OUTPATIENT)
Dept: CALL CENTER | Facility: HOSPITAL | Age: 56
End: 2021-10-23

## 2021-10-23 VITALS
BODY MASS INDEX: 29.44 KG/M2 | WEIGHT: 160 LBS | HEART RATE: 102 BPM | SYSTOLIC BLOOD PRESSURE: 121 MMHG | TEMPERATURE: 98.4 F | RESPIRATION RATE: 18 BRPM | OXYGEN SATURATION: 97 % | HEIGHT: 62 IN | DIASTOLIC BLOOD PRESSURE: 83 MMHG

## 2021-10-23 LAB
BACTERIA SPEC AEROBE CULT: NORMAL
GLUCOSE BLDC GLUCOMTR-MCNC: 202 MG/DL (ref 70–130)
GLUCOSE BLDC GLUCOMTR-MCNC: 286 MG/DL (ref 70–130)
GLUCOSE BLDC GLUCOMTR-MCNC: 330 MG/DL (ref 70–130)
MAXIMAL PREDICTED HEART RATE: 164 BPM
STRESS TARGET HR: 139 BPM

## 2021-10-23 PROCEDURE — G0108 DIAB MANAGE TRN  PER INDIV: HCPCS

## 2021-10-23 PROCEDURE — 63710000001 INSULIN LISPRO (HUMAN) PER 5 UNITS: Performed by: INTERNAL MEDICINE

## 2021-10-23 PROCEDURE — 99239 HOSP IP/OBS DSCHRG MGMT >30: CPT | Performed by: INTERNAL MEDICINE

## 2021-10-23 PROCEDURE — 82962 GLUCOSE BLOOD TEST: CPT

## 2021-10-23 PROCEDURE — 63710000001 INSULIN DETEMIR PER 5 UNITS: Performed by: INTERNAL MEDICINE

## 2021-10-23 RX ORDER — BLOOD SUGAR DIAGNOSTIC
STRIP MISCELLANEOUS
Qty: 100 EACH | Refills: 1 | Status: SHIPPED | OUTPATIENT
Start: 2021-10-23 | End: 2023-01-31 | Stop reason: SDUPTHER

## 2021-10-23 RX ORDER — ASPIRIN 81 MG/1
81 TABLET, CHEWABLE ORAL DAILY
Qty: 30 TABLET | Refills: 1 | Status: SHIPPED | OUTPATIENT
Start: 2021-10-23 | End: 2021-12-17 | Stop reason: SDUPTHER

## 2021-10-23 RX ORDER — ATORVASTATIN CALCIUM 80 MG/1
80 TABLET, FILM COATED ORAL NIGHTLY
Qty: 30 TABLET | Refills: 0 | Status: SHIPPED | OUTPATIENT
Start: 2021-10-23 | End: 2021-10-29 | Stop reason: SDUPTHER

## 2021-10-23 RX ORDER — LOSARTAN POTASSIUM 25 MG/1
25 TABLET ORAL
Qty: 30 TABLET | Refills: 0 | Status: SHIPPED | OUTPATIENT
Start: 2021-10-24 | End: 2021-10-29 | Stop reason: SDUPTHER

## 2021-10-23 RX ORDER — CLOPIDOGREL BISULFATE 75 MG/1
75 TABLET ORAL DAILY
Qty: 21 TABLET | Refills: 0 | Status: SHIPPED | OUTPATIENT
Start: 2021-10-23 | End: 2021-11-13

## 2021-10-23 RX ADMIN — ASPIRIN 81 MG CHEWABLE TABLET 81 MG: 81 TABLET CHEWABLE at 09:40

## 2021-10-23 RX ADMIN — CLOPIDOGREL BISULFATE 75 MG: 75 TABLET ORAL at 09:40

## 2021-10-23 RX ADMIN — LOSARTAN POTASSIUM 25 MG: 25 TABLET, FILM COATED ORAL at 09:39

## 2021-10-23 RX ADMIN — DOCUSATE SODIUM 50 MG AND SENNOSIDES 8.6 MG 2 TABLET: 8.6; 5 TABLET, FILM COATED ORAL at 09:40

## 2021-10-23 RX ADMIN — INSULIN LISPRO 4 UNITS: 100 INJECTION, SOLUTION INTRAVENOUS; SUBCUTANEOUS at 09:39

## 2021-10-23 RX ADMIN — INSULIN DETEMIR 10 UNITS: 100 INJECTION, SOLUTION SUBCUTANEOUS at 09:41

## 2021-10-23 RX ADMIN — INSULIN LISPRO 7 UNITS: 100 INJECTION, SOLUTION INTRAVENOUS; SUBCUTANEOUS at 13:20

## 2021-10-23 RX ADMIN — SODIUM CHLORIDE, PRESERVATIVE FREE 10 ML: 5 INJECTION INTRAVENOUS at 09:40

## 2021-10-23 NOTE — OUTREACH NOTE
Prep Survey      Responses   [facility] patient discharged from? [place]   Is LACE score < 7 ? Yes   Emergency Room discharge w/ pulse ox? No   Eligibility [facility]   Date of Admission 10/21/21   Date of Discharge 10/23/21   Discharge Disposition Home or Self Care   Discharge diagnosis Acute stroke due to ischemia    Does the patient have one of the following disease processes/diagnoses(primary or secondary)? Stroke (TIA)   Does the patient have Home health ordered? No   Is there a DME ordered? No   Prep survey completed? Yes          Dulce Brown RN

## 2021-10-23 NOTE — CONSULTS
After obtaining permission, seen Ms. Guzmán for diabetes education.  We reviewed her current A1c of 11.3 and discussed its significance. Specifically discussed risk of stroke and MI associated with elevated A1c. Ms. Guzmán states she quit taking all medications a few months ago and admits to drinking several Lydia 8's each day.  We reviewed A1c and glucose goals per ADA and the importance of lowering A1c to avoid any further complications. She was encouraged to limit or quit drinking Lydia 8.  She was encouraged to drink at least 36 ounces of water each day. She states she isn't drinking any. She was encouraged to be as active as tolerated with an eventual goal of 150 minutes.  We discussed limiting carbs to 2-3 servings per meal. She was given examples of how to build a healthy plate.  Ms. Guzmán states she doesn't have a working meter.  She was provided a new donated One Touch meter and demonstrated use. She was able to teach back.  We reviewed medications for diabetes, MOA, and side effects. Ms. Guzmán states she doesn't want to continue Metformin due to causing hypoglycemia. She states she eats 1 meal per day on average. She was encouraged to continue Metformin and to eat three meals per day. She was instructed to take insulin as ordered to avoid future complications. She states she is concerned about having another stroke in the future and plans to adhere to medication regimen.  I reviewed insulin pen and syringe administration, including sites for injection, 2 unit air shot, and using a new needle each time.  She was encouraged to take meter to her PCP appointment in a couple of weeks.  We discussed the possible need for mealtime insulin in the future. She was encouraged to call PCP for trending high glucoses above 250.  We reviewed the Rule of 15 for treating hypoglycemia.  She was given handouts on the connection between diabetes and stroke, a Diabetes Basics Booklet, and a glucose goal sheet. She has a  stroke/diabetes follow up class scheduled for November 2, 2021. She has our contact information for questions in the interim. Thank you.

## 2021-10-23 NOTE — DISCHARGE SUMMARY
[facility] Medicine Services  DISCHARGE SUMMARY    Patient Name: Shital Guzmán  : 1965  MRN: [id number]    Date of Admission: 10/21/2021 12:35 PM  Date of Discharge:  10/23/2021  Primary Care Physician: Ela Brown PA-C    Consults     Date and Time Order Name Status Description    10/22/2021  1:16 PM Inpatient Cardiology Consult Completed     10/21/2021  5:26 PM Inpatient Neurology Consult Stroke Completed           Hospital Course     Presenting Problem:   Acute stroke due to ischemia (HCC) [I63.9]    Active Hospital Problems    Diagnosis  POA   • **Acute stroke due to ischemia (HCC) [I63.9]  Yes   • Essential hypertension [I10]  Yes   • Hyperlipidemia [E78.5]  Yes      Resolved Hospital Problems   No resolved problems to display.          Hospital Course:  Shital Guzmán is a 56 y.o. female  with history of hypertension and hyperlipidemia presented with left-sided numbness found to have acute infarcts in the right thalamus     Right thalamic infarct   - MRI of the brain with and without contrast showed scattered small acute infarcts of the right thalamus and right corona radiata, pericallosal white matter.  Evidence of chronic right basal ganglia lacunar infarct.  Intracranial Dopplers obtained, report pending at time of discharge.  -Neurology consulted, suspect etiology cardio embolic, possibly has paroxysmal atrial fibrillation  -Continue DAPT with aspirin and Plavix for 21 days followed by aspirin monotherapy, continue statin  -Cardiology consulted, plan for 48-hour cardiac monitoring and if negative 30-day monitor due to insurance restrictions (they will contact her on this)  -Loaded with Plavix.  Continue 75 mg a day for 21 days  -PT/OT, evaluated and recommend home with assist and outpatient PT, CM to arrange  -Echo with EF 56-60%, diastolic dysfunction     Hypertension  -Continue amlodipine and losartan 25 mg daily     Hyperlipidemia  -Continue statin     Poorly  controlled type 2 diabetes with A1c 11.3%   -Patient previously on Metformin but discontinued this since she has lost a significant amount of weight   -We will discharge home with Levemir 10 units qam, continue Metformin and recommend close follow-up with PCP in the next 1 to 2 days for continued management.    -Diabetes educator     Cocaine abuse  -chemical dependency was consulted    Medical noncompliance    Discharge Follow Up Recommendations for outpatient labs/diagnostics:  Follow-up with neurology in 4 weeks  Follow-up with PCP in 1 week    Day of Discharge     HPI: No acute events overnight, patient states that she feels better, numbness still present but is mostly on the left extremities    Review of Systems  Gen- No fevers, chills  CV- No chest pain, palpitations  Resp- No cough, dyspnea  GI- No N/V/D, abd pain      Vital Signs:   Temp:  [97.6 °F (36.4 °C)-98.6 °F (37 °C)] 98.4 °F (36.9 °C)  Heart Rate:  [] 112  Resp:  [16-18] 18  BP: (121-154)/() 121/83     Physical Exam:  Constitutional: No acute distress, awake, alert  HENT: NCAT, mucous membranes moist  Respiratory: Clear to auscultation bilaterally, respiratory effort normal   Cardiovascular: RRR, no murmurs, rubs, or gallops  Gastrointestinal: Positive bowel sounds, soft, nontender, nondistended  Musculoskeletal: No bilateral ankle edema  Psychiatric: Appropriate affect, cooperative  Neurologic: Oriented x 3, left-sided numbness  Skin: No rashes    Pertinent  and/or Most Recent Results     LAB RESULTS:      Lab 10/22/21  0759 10/21/21  1321   WBC 10.28 8.85   HEMOGLOBIN 14.9 15.2   HEMATOCRIT 43.9 46.2   PLATELETS 287 290   NEUTROS ABS 5.04 5.02   IMMATURE GRANS (ABS)  --  0.02   LYMPHS ABS  --  3.08   MONOS ABS  --  0.49   EOS ABS 0.31 0.18   MCV 90.5 90.4         Lab 10/22/21  0759 10/21/21  1321   SODIUM  --  136   POTASSIUM  --  4.3   CHLORIDE  --  100   CO2  --  24.0   ANION GAP  --  12.0   BUN  --  15   CREATININE  --  0.86   GLUCOSE   --  435*   CALCIUM  --  9.8   HEMOGLOBIN A1C 11.30*  --          Lab 10/21/21  1321   TOTAL PROTEIN 7.3   ALBUMIN 4.30   GLOBULIN 3.0   ALT (SGPT) 54*   AST (SGOT) 27   BILIRUBIN 0.4   ALK PHOS 87         Lab 10/21/21  1321   TROPONIN T <0.010         Lab 10/22/21  0759   CHOLESTEROL 268*   LDL CHOL 199*   HDL CHOL 43   TRIGLYCERIDES 142             Brief Urine Lab Results  (Last result in the past 365 days)      Color   Clarity   Blood   Leuk Est   Nitrite   Protein   CREAT   Urine HCG        10/21/21 2111 Yellow   Clear   Small (1+)   Negative   Negative   Negative               Microbiology Results (last 10 days)     Procedure Component Value - Date/Time    Urine Culture - Urine, Urine, Clean Catch [[id number]] Collected: 10/21/21 2111    Lab Status: Final result Specimen: Urine, Clean Catch Updated: 10/23/21 1215     Urine Culture 25,000 CFU/mL Mixed Dennys Isolated    Narrative:      Specimen contains mixed organisms of questionable pathogenicity which indicates contamination with commensal dennys.  Further identification is unlikely to provide clinically useful information.  Suggest recollection.    COVID-19, [facility] IN-HOUSE NP/OP SWAB IN UTM/VTM/SALINE TRANSPORT MEDIA 24HR TAT - Swab, Nasopharynx [[id number]]  (Normal) Collected: 10/21/21 1828    Lab Status: Final result Specimen: Swab from Nasopharynx Updated: 10/22/21 1401     COVID19 Not Detected    Narrative:      Fact sheet for providers: https://www.fda.gov/media/463208/download     Fact sheet for patients: https://www.fda.gov/media/289592/download    Test performed by RT PCR.          Adult Transthoracic Echo Complete W/ Cont if Necessary Per Protocol (With Agitated Saline)    Result Date: 10/22/2021  · Left ventricular ejection fraction appears to be 56 - 60%. Left ventricular systolic function is normal. · Left ventricular diastolic function is consistent with (grade Ia w/high LAP) impaired relaxation. · Left ventricular wall thickness is  consistent with mild concentric hypertrophy. · Saline test results are negative for right to left atrial level shunt. · Mild aortic valve stenosis is present. · Peak velocity of the flow distal to the aortic valve is 230 cm/s. Aortic valve mean pressure gradient is 12 mmHg.      MRI Angiogram Head Without Contrast    Result Date: 10/22/2021  EXAMINATION: MRI ANGIOGRAM HEAD WO CONTRAST-, MRI ANGIOGRAM NECK WO CONTRAST-  INDICATION: Stroke, follow up; I63.9-Cerebral infarction, unspecified; R20.0-Anesthesia of skin; R73.9-Hyperglycemia, unspecified; I10-Essential (primary) hypertension  TECHNIQUE: Noncontrast time-of-flight 3-dimensional MR angiogram of the head and neck with three-dimensional maximum intensity projections postprocessed  COMPARISON: Brain MRI 1 day prior  FINDINGS: Noncontrast time-of-flight MR angiogram of the neck demonstrates expected flow related signal within bilateral common carotid arteries. Carotid bifurcations and ICA origins demonstrate no significant atherosclerotic change or luminal narrowing, 0% by NASCET criteria bilaterally. The imaged segments of the vertebral arteries are also normal in course and caliber. Intracranially, the carotid siphons demonstrate no significant atherosclerotic narrowing. The branching [place] of [place] vessels are patent, without evidence of flow-limiting stenosis, large vessel occlusion or aneurysmal dilatation. The vertebrobasilar system is unremarkable without evidence of flow-limiting stenosis.      Essentially normal noncontrast MR angiogram of the head and neck with no evidence of flow-limiting stenosis, large vessel occlusion or aneurysmal dilatation.   This report was finalized on 10/22/2021 9:40 AM by Thanh Tam.      MRI Angiogram Neck Without Contrast    Result Date: 10/22/2021  EXAMINATION: MRI ANGIOGRAM HEAD WO CONTRAST-, MRI ANGIOGRAM NECK WO CONTRAST-  INDICATION: Stroke, follow up; I63.9-Cerebral infarction, unspecified; R20.0-Anesthesia of  skin; R73.9-Hyperglycemia, unspecified; I10-Essential (primary) hypertension  TECHNIQUE: Noncontrast time-of-flight 3-dimensional MR angiogram of the head and neck with three-dimensional maximum intensity projections postprocessed  COMPARISON: Brain MRI 1 day prior  FINDINGS: Noncontrast time-of-flight MR angiogram of the neck demonstrates expected flow related signal within bilateral common carotid arteries. Carotid bifurcations and ICA origins demonstrate no significant atherosclerotic change or luminal narrowing, 0% by NASCET criteria bilaterally. The imaged segments of the vertebral arteries are also normal in course and caliber. Intracranially, the carotid siphons demonstrate no significant atherosclerotic narrowing. The branching [place] of [place]ood vessels are patent, without evidence of flow-limiting stenosis, large vessel occlusion or aneurysmal dilatation. The vertebrobasilar system is unremarkable without evidence of flow-limiting stenosis.      Essentially normal noncontrast MR angiogram of the head and neck with no evidence of flow-limiting stenosis, large vessel occlusion or aneurysmal dilatation.   This report was finalized on 10/22/2021 9:40 AM by Thanh Tam.      MRI Brain With & Without Contrast    Result Date: 10/21/2021  EXAMINATION: MRI BRAIN W WO CONTRAST-  INDICATION: left facial/extremity paresthesias  TECHNIQUE: Multiplanar, multisequence MR imaging of the brain before and after IV gadolinium base contrast.  COMPARISON: NONE  FINDINGS:  There are 3 small foci of acute infarcts, one in the right thalamus and 2 adjacent foci in the right corona radiata/pericallosal white matter (series 2 image 58 and series 2 image 60-61). On postcontrast sequences there is some corresponding peripheral enhancement about the right corona radiata infarct (series 11 image 14). There is evidence of prior right basal ganglia lacunar infarct. There is background subcortical and periventricular white matter  FLAIR/T2 hyperintensities which are nonspecific but can be seen in setting of chronic small vessel ischemic change. No acute intracranial hemorrhage. No intracranial mass. The midline structures are unremarkable. Normal size and configuration of the ventricles without evidence of extra-axial collection, midline shift, or herniation. Flow voids and enhancement of the large intracranial arterial vasculature appear unremarkable on this nondedicated exam. Normal appearance of the orbits. The mastoid air cells and paranasal sinuses are clear. No acute bony findings.        Scattered small acute infarcts in the right thalamus and right corona radiata/pericallosal white matter.  Evidence of chronic right basal ganglia lacunar infarct on the background of scattered subcortical and periventricular white matter hyperintensities which may reflect sequelae of chronic small vessel ischemic change.   Findings discussed with Dr. Kelly by Dr. Fields via telephone at 2:50 PM 10/21/2021. Patient endorses left-sided paresthesias and symptoms.   This report was finalized on 10/21/2021 3:12 PM by Moises Fields MD.      XR Chest 1 View    Result Date: 10/21/2021  EXAMINATION: XR CHEST 1 VW-  INDICATION: left side paresthesias  TECHNIQUE: Frontal view of the chest  COMPARISON: NONE  FINDINGS:  The cardiomediastinal silhouette is normal. No focal consolidation. A small 3.0 x 1.8 cm crescentic pleural-based density at the left apex is noted. No pneumothorax. No acute osseous findings.       Small crescentic pleural based density at the left apex is indeterminant. Consider CT for further evaluation.  The lungs are clear.  This report was finalized on 10/21/2021 1:59 PM by Moises Fields MD.      Doppler Transcranial Microbubble Injection CAR    Result Date: 10/23/2021  Nondiagnostic study due to absent temporal windows                         Results for orders placed during the hospital encounter of 10/21/21    Doppler Transcranial  Microbubble Injection CAR    Interpretation Summary  Nondiagnostic study due to absent temporal windows      Results for orders placed during the hospital encounter of 10/21/21    Doppler Transcranial Microbubble Injection CAR    Interpretation Summary  Nondiagnostic study due to absent temporal windows      Results for orders placed during the hospital encounter of 10/21/21    Adult Transthoracic Echo Complete W/ Cont if Necessary Per Protocol (With Agitated Saline)    Interpretation Summary  · Left ventricular ejection fraction appears to be 56 - 60%. Left ventricular systolic function is normal.  · Left ventricular diastolic function is consistent with (grade Ia w/high LAP) impaired relaxation.  · Left ventricular wall thickness is consistent with mild concentric hypertrophy.  · Saline test results are negative for right to left atrial level shunt.  · Mild aortic valve stenosis is present.  · Peak velocity of the flow distal to the aortic valve is 230 cm/s. Aortic valve mean pressure gradient is 12 mmHg.      Plan for Follow-up of Pending Labs/Results:    Discharge Details        Discharge Medications      New Medications      Instructions Start Date   aspirin 81 MG chewable tablet   81 mg, Oral, Daily      clopidogrel 75 MG tablet  Commonly known as: PLAVIX   75 mg, Oral, Daily      FREESTYLE TEST STRIPS test strip  Generic drug: glucose blood   Use as instructed      Insulin Glargine 100 UNIT/ML injection pen  Commonly known as: LANTUS SOLOSTAR   10 Units, Subcutaneous, Nightly      losartan 25 MG tablet  Commonly known as: COZAAR   25 mg, Oral, Every 24 Hours Scheduled   Start Date: October 24, 2021        Changes to Medications      Instructions Start Date   atorvastatin 80 MG tablet  Commonly known as: LIPITOR  What changed:   · medication strength  · how much to take  · how to take this  · when to take this  · additional instructions   80 mg, Oral, Nightly         Continue These Medications       Instructions Start Date   amLODIPine 10 MG tablet  Commonly known as: NORVASC   10 mg, Oral, Daily      loratadine 10 MG tablet  Commonly known as: Claritin   10 mg, Oral, Daily      triamcinolone 0.1 % ointment  Commonly known as: KENALOG   Used twice daily for 2 weeks and then taper to once daily for 2 weeks.  If itching remains well controlled using every other day as needed.             Allergies   Allergen Reactions   • Carvedilol Hives   • Hydrochlorothiazide Hives       Discharge Disposition:Home  Home or Self Care  Diet:  Hospital:  Diet Order   Procedures   • Diet Regular; Cardiac, Consistent Carbohydrate     Activity:As tolerated    Restrictions or Other Recommendations:  Outpatient PT  48hr Holter monitor       CODE STATUS:    Code Status and Medical Interventions:   Ordered at: 10/21/21 8381     Level Of Support Discussed With:    Patient     Code Status:    CPR     Medical Interventions (Level of Support Prior to Arrest):    Full       Future Appointments   Date Time Provider Department Center   11/2/2021 11:30 AM [facility]       Additional Instructions for the Follow-ups that You Need to Schedule     Ambulatory Referral to Occupational Therapy   As directed      Specialty needed: Evaluate and treat               Ayla Reyes MD  10/23/21    Time Spent on Discharge:  I spent  35  minutes on this discharge activity which included: face-to-face encounter with the patient, reviewing the data in the system, coordination of the care with the nursing staff as well as consultants, documentation, and entering orders.

## 2021-10-25 ENCOUNTER — TRANSITIONAL CARE MANAGEMENT TELEPHONE ENCOUNTER (OUTPATIENT)
Dept: CALL CENTER | Facility: HOSPITAL | Age: 56
End: 2021-10-25

## 2021-10-25 ENCOUNTER — EDUCATION (OUTPATIENT)
Dept: DIABETES SERVICES | Facility: HOSPITAL | Age: 56
End: 2021-10-25

## 2021-10-25 DIAGNOSIS — I48.91 ATRIAL FIBRILLATION, UNSPECIFIED TYPE (HCC): Primary | ICD-10-CM

## 2021-10-25 NOTE — OUTREACH NOTE
Call Center TCM Note      Responses   [facility] patient discharged from? [place]   Does the patient have one of the following disease processes/diagnoses(primary or secondary)? Stroke (TIA)   TCM attempt successful? No   Unsuccessful attempts Attempt 1          Myah Santiago MA    10/25/2021, 12:42 EDT

## 2021-10-25 NOTE — CONSULTS
"Diabetes Education    Patient Name:  Shital Guzmán  YOB: 1965  MRN: [id number]  Admit Date:  (Not on file)        Received voicemail from pt regarding difficulty w/ her one touch meter at home. Chart reviewed and noted pt was seen by diabetes educator while in hospital on 10/23 and was given meter as well as insulin teaching was done. Pt states she presses button to turn meter on and can't seem to get past all of the \"prompts\" for the machine to be ready to accept a sample of blood. Per documentation in chart pt has one touch verio reflect meter, pt verified. Instructed her to turn meter on by simply placing strip into meter which should lead directly to the testing screen/function. She verbalized understanding but does not have meter right in front of her so was unable to attempt while we were on the phone. Also asked about insulin administration as pt was dc'd home on insulin; she states she has been able to start her insulin without difficulty. Offered video visit to help w/ meter, pt states she will try to test again and will call back if she needs further assistance.   Pt called back and states meter is still not working. Offered zoom video visit for assistance--pt agrees. Link for zoom telehealth sent but pt did not join. Called pt back--she states she had another phone call and could not join. Offered to send videos from one touch showing meter set up and use for pt to watch--she would like to try this and then decide if she needs video visit. Sent via email youtube links from  for set up, using meter for blood sugar check, and use of one touch delica landing device that comes with meter. Will follow up.       Electronically signed by:  Lakshmi Gutierres RN, [facility] [place]  10/25/21 13:51 EDT  "

## 2021-10-25 NOTE — OUTREACH NOTE
Call Center TCM Note      Responses   [facility] patient discharged from? [place]   Does the patient have one of the following disease processes/diagnoses(primary or secondary)? Stroke (TIA)   TCM attempt successful? No   Unsuccessful attempts Attempt 2          Myah Santiago MA    10/25/2021, 15:48 EDT

## 2021-10-26 ENCOUNTER — TRANSITIONAL CARE MANAGEMENT TELEPHONE ENCOUNTER (OUTPATIENT)
Dept: CALL CENTER | Facility: HOSPITAL | Age: 56
End: 2021-10-26

## 2021-10-26 NOTE — OUTREACH NOTE
Call Center TCM Note      Responses   [facility] patient discharged from? [place]   Does the patient have one of the following disease processes/diagnoses(primary or secondary)? Stroke (TIA)   TCM attempt successful? Yes   Call start time 1325   Call end time 1330   Discharge diagnosis Acute stroke due to ischemia    Meds reviewed with patient/caregiver? Yes   Is the patient having any side effects they believe may be caused by any medication additions or changes? No   Does the patient have all medications ordered at discharge? Yes   Is the patient taking all medications as directed (includes completed medication regime)? Yes   Does the patient have a primary care provider?  Yes   Does the patient have an appointment with their PCP within 7 days of discharge? Yes   Comments regarding PCP Hosp dc fu apt on 10/29/21 with PCP    Psychosocial issues? No   Did the patient receive a copy of their discharge instructions? Yes   Nursing interventions Reviewed instructions with patient   What is the patient's perception of their health status since discharge? Improving   Is the patient/caregiver able to teach back the risk factors for a stroke? Diabetes,  Physical inactivity and obesity   Is the patient/caregiver able to teach back signs and symptoms related to disease process for when to call PCP? Yes   Is the patient/caregiver able to teach back signs and symptoms related to disease process for when to call 911? Yes   Is the patient/caregiver able to teach back the hierarchy of who to call/visit for symptoms/problems? PCP, Specialist, Home health nurse, Urgent Care, ED, 911 Yes   TCM call completed? Yes   Wrap up additional comments patient ended call on accident when she took another call- was able to address the above questions and concerns          Ghislaine Dasilva RN    10/26/2021, 13:31 EDT

## 2021-10-29 ENCOUNTER — OFFICE VISIT (OUTPATIENT)
Dept: FAMILY MEDICINE CLINIC | Facility: CLINIC | Age: 56
End: 2021-10-29

## 2021-10-29 VITALS
TEMPERATURE: 97.2 F | OXYGEN SATURATION: 99 % | HEART RATE: 93 BPM | SYSTOLIC BLOOD PRESSURE: 138 MMHG | BODY MASS INDEX: 31.65 KG/M2 | WEIGHT: 172 LBS | DIASTOLIC BLOOD PRESSURE: 84 MMHG | HEIGHT: 62 IN

## 2021-10-29 DIAGNOSIS — I10 ESSENTIAL HYPERTENSION: ICD-10-CM

## 2021-10-29 DIAGNOSIS — I63.9 ACUTE STROKE DUE TO ISCHEMIA (HCC): ICD-10-CM

## 2021-10-29 DIAGNOSIS — E11.65 UNCONTROLLED TYPE 2 DIABETES MELLITUS WITH HYPERGLYCEMIA (HCC): Primary | ICD-10-CM

## 2021-10-29 LAB — GLUCOSE BLDC GLUCOMTR-MCNC: 248 MG/DL (ref 70–130)

## 2021-10-29 PROCEDURE — 99214 OFFICE O/P EST MOD 30 MIN: CPT | Performed by: PHYSICIAN ASSISTANT

## 2021-10-29 PROCEDURE — 82962 GLUCOSE BLOOD TEST: CPT | Performed by: PHYSICIAN ASSISTANT

## 2021-10-29 RX ORDER — ATORVASTATIN CALCIUM 80 MG/1
80 TABLET, FILM COATED ORAL DAILY
Qty: 30 TABLET | Refills: 11 | Status: SHIPPED | OUTPATIENT
Start: 2021-10-29 | End: 2021-12-17 | Stop reason: SDUPTHER

## 2021-10-29 RX ORDER — METFORMIN HYDROCHLORIDE 500 MG/1
500 TABLET, EXTENDED RELEASE ORAL
Qty: 60 TABLET | Refills: 2 | Status: SHIPPED | OUTPATIENT
Start: 2021-10-29 | End: 2021-12-17 | Stop reason: SDUPTHER

## 2021-10-29 RX ORDER — BLOOD-GLUCOSE METER
1 KIT MISCELLANEOUS DAILY
Qty: 1 KIT | Refills: 0 | Status: SHIPPED | OUTPATIENT
Start: 2021-10-29

## 2021-10-29 RX ORDER — LOSARTAN POTASSIUM 25 MG/1
TABLET ORAL
Qty: 30 TABLET | Refills: 11 | Status: SHIPPED | OUTPATIENT
Start: 2021-10-29 | End: 2021-10-29 | Stop reason: SDUPTHER

## 2021-10-29 RX ORDER — ATORVASTATIN CALCIUM 80 MG/1
TABLET, FILM COATED ORAL
Qty: 30 TABLET | Refills: 11 | Status: SHIPPED | OUTPATIENT
Start: 2021-10-29 | End: 2021-10-29 | Stop reason: SDUPTHER

## 2021-10-29 RX ORDER — AMLODIPINE BESYLATE 10 MG/1
10 TABLET ORAL DAILY
Qty: 30 TABLET | Refills: 11 | Status: SHIPPED | OUTPATIENT
Start: 2021-10-29 | End: 2022-11-09

## 2021-10-29 RX ORDER — LOSARTAN POTASSIUM 25 MG/1
25 TABLET ORAL DAILY
Qty: 30 TABLET | Refills: 11 | Status: SHIPPED | OUTPATIENT
Start: 2021-10-29 | End: 2021-12-17 | Stop reason: SDUPTHER

## 2021-10-29 RX ORDER — AMLODIPINE BESYLATE 10 MG/1
10 TABLET ORAL DAILY
Qty: 30 TABLET | Refills: 11 | Status: SHIPPED | OUTPATIENT
Start: 2021-10-29 | End: 2021-10-29 | Stop reason: SDUPTHER

## 2021-10-29 NOTE — PROGRESS NOTES
Subjective   Shital Guzmán is a 56 y.o. female  Med Refill (req refill on losartan, atorvastatin and Lantus )      History of Present Illness  Patient is a 56-year-old female who presents today following recent CVA.  I have seen her once previously in 2018, she has not followed up with us since then until today.  She states she stopped her Metformin because she had lost some weight did not feel like she needed it.  She is here today follow-up on uncontrolled diabetes.  She states she has upcoming appoint with neurology, cardiology and diabetic nurse educator.  She was not provided an updated prescription Metformin at her hospital discharge and needs a new prescription.  Her blood sugars continue to stay elevated since hospital discharge.  The following portions of the patient's history were reviewed and updated as appropriate: allergies, current medications, past social history and problem list    Review of Systems   Constitutional: Negative for fatigue and unexpected weight change.   Respiratory: Negative for cough, chest tightness and shortness of breath.    Cardiovascular: Negative for chest pain, palpitations and leg swelling.   Gastrointestinal: Negative for nausea.   Skin: Negative for color change and rash.   Allergic/Immunologic: Positive for immunocompromised state.   Neurological: Negative for dizziness, syncope, weakness and headaches.       Objective     Vitals:    10/29/21 1439   BP: 138/84   Pulse: 93   Temp: 97.2 °F (36.2 °C)   SpO2: 99%       Physical Exam  Vitals and nursing note reviewed.   Constitutional:       General: She is not in acute distress.     Appearance: Normal appearance. She is well-developed. She is obese. She is not ill-appearing, toxic-appearing or diaphoretic.      Comments: BMI 31.4   HENT:      Head: Normocephalic and atraumatic.   Neck:      Vascular: No JVD.   Cardiovascular:      Rate and Rhythm: Normal rate and regular rhythm.      Heart sounds: Normal heart sounds. No murmur  heard.      Pulmonary:      Effort: Pulmonary effort is normal. No respiratory distress.      Breath sounds: Normal breath sounds.   Chest:      Chest wall: No tenderness.   Abdominal:      General: There is no distension.      Palpations: Abdomen is soft.      Tenderness: There is no abdominal tenderness.   Skin:     General: Skin is warm and dry.      Coloration: Skin is not pale.      Findings: No erythema.   Neurological:      Mental Status: She is alert and oriented to person, place, and time.   Psychiatric:         Mood and Affect: Mood normal.         Speech: Speech is rapid and pressured.         Behavior: Behavior normal.     Random glucose today 248.    Assessment/Plan     Diagnoses and all orders for this visit:    1. Uncontrolled type 2 diabetes mellitus with hyperglycemia (HCC) (Primary)  -     POCT Glucose  -     Ambulatory Referral to Endocrinology    2. Essential hypertension  -     Discontinue: amLODIPine (NORVASC) 10 MG tablet; Take 1 tablet by mouth Daily. For BP  Dispense: 30 tablet; Refill: 11  -     amLODIPine (NORVASC) 10 MG tablet; Take 1 tablet by mouth Daily. For BP  Dispense: 30 tablet; Refill: 11    3. Acute stroke due to ischemia (HCC)    Other orders  -     Discontinue: losartan (COZAAR) 25 MG tablet; Take one daily for BP  Dispense: 30 tablet; Refill: 11  -     Discontinue: atorvastatin (LIPITOR) 80 MG tablet; Take one daily for cholesterol  Dispense: 30 tablet; Refill: 11  -     Insulin Glargine (LANTUS SOLOSTAR) 100 UNIT/ML injection pen; Inject 10 Units under the skin into the appropriate area as directed Every Night for 30 days. For diabetes  Dispense: 15 mL; Refill: 2  -     atorvastatin (LIPITOR) 80 MG tablet; Take 1 tablet by mouth Daily for cholesterol  Dispense: 30 tablet; Refill: 11  -     losartan (COZAAR) 25 MG tablet; Take 1 tablet by mouth Daily.  Dispense: 30 tablet; Refill: 11  -     metFORMIN ER (GLUCOPHAGE-XR) 500 MG 24 hr tablet; Take 1 tablet by mouth Daily With  Dinner.  Dispense: 60 tablet; Refill: 2  -     Blood Glucose Monitoring Suppl (OneTouch Verio) w/Device kit; 1 each 2 (Two) Times a Day.  Dispense: 1 kit; Refill: 0    Discussed with patient need to back on metformin along with her insulin and that we would be referring her endocrinology for further consultation regarding medication adjustments to control her A1c under 7.  Discussed importance of follow-up with cardiology and neurology as well as physical therapy as scheduled.

## 2021-11-01 ENCOUNTER — READMISSION MANAGEMENT (OUTPATIENT)
Dept: CALL CENTER | Facility: HOSPITAL | Age: 56
End: 2021-11-01

## 2021-11-01 NOTE — OUTREACH NOTE
Stroke Week 2 Survey      Responses   [facility] patient discharged from? [place]   Does the patient have one of the following disease processes/diagnoses(primary or secondary)? Stroke (TIA)   Week 2 attempt successful? No   Unsuccessful attempts Attempt 1          Rosalba Bailey RN

## 2021-11-02 ENCOUNTER — TELEPHONE (OUTPATIENT)
Dept: NEUROLOGY | Facility: OTHER | Age: 56
End: 2021-11-02

## 2021-11-02 ENCOUNTER — TELEPHONE (OUTPATIENT)
Dept: FAMILY MEDICINE CLINIC | Facility: CLINIC | Age: 56
End: 2021-11-02

## 2021-11-02 ENCOUNTER — HOSPITAL ENCOUNTER (OUTPATIENT)
Dept: DIABETES SERVICES | Facility: HOSPITAL | Age: 56
Setting detail: RECURRING SERIES
Discharge: HOME | End: 2021-11-02

## 2021-11-02 ENCOUNTER — HOSPITAL ENCOUNTER (EMERGENCY)
Facility: HOSPITAL | Age: 56
Discharge: HOME | End: 2021-11-02
Attending: EMERGENCY MEDICINE | Admitting: EMERGENCY MEDICINE

## 2021-11-02 VITALS
WEIGHT: 170 LBS | OXYGEN SATURATION: 98 % | DIASTOLIC BLOOD PRESSURE: 86 MMHG | SYSTOLIC BLOOD PRESSURE: 132 MMHG | HEIGHT: 62 IN | RESPIRATION RATE: 20 BRPM | HEART RATE: 91 BPM | TEMPERATURE: 98.2 F | BODY MASS INDEX: 31.28 KG/M2

## 2021-11-02 DIAGNOSIS — E11.65 UNCONTROLLED TYPE 2 DIABETES MELLITUS WITH HYPERGLYCEMIA (HCC): Primary | ICD-10-CM

## 2021-11-02 DIAGNOSIS — I10 UNCONTROLLED HYPERTENSION: ICD-10-CM

## 2021-11-02 LAB
ALBUMIN SERPL-MCNC: 4.6 G/DL (ref 3.5–5.2)
ALBUMIN/GLOB SERPL: 1.2 G/DL
ALP SERPL-CCNC: 89 U/L (ref 39–117)
ALT SERPL W P-5'-P-CCNC: 35 U/L (ref 1–33)
ANION GAP SERPL CALCULATED.3IONS-SCNC: 14 MMOL/L (ref 5–15)
AST SERPL-CCNC: 21 U/L (ref 1–32)
B-OH-BUTYR SERPL-SCNC: 0.07 MMOL/L (ref 0.02–0.27)
BACTERIA UR QL AUTO: ABNORMAL /HPF
BASOPHILS # BLD AUTO: 0.06 10*3/MM3 (ref 0–0.2)
BASOPHILS NFR BLD AUTO: 0.5 % (ref 0–1.5)
BILIRUB SERPL-MCNC: 0.6 MG/DL (ref 0–1.2)
BILIRUB UR QL STRIP: NEGATIVE
BUN SERPL-MCNC: 14 MG/DL (ref 6–20)
BUN/CREAT SERPL: 20.3 (ref 7–25)
CALCIUM SPEC-SCNC: 10 MG/DL (ref 8.6–10.5)
CHLORIDE SERPL-SCNC: 103 MMOL/L (ref 98–107)
CLARITY UR: ABNORMAL
CO2 SERPL-SCNC: 22 MMOL/L (ref 22–29)
COLOR UR: YELLOW
CREAT SERPL-MCNC: 0.69 MG/DL (ref 0.57–1)
DEPRECATED RDW RBC AUTO: 36.7 FL (ref 37–54)
EOSINOPHIL # BLD AUTO: 0.2 10*3/MM3 (ref 0–0.4)
EOSINOPHIL NFR BLD AUTO: 1.8 % (ref 0.3–6.2)
ERYTHROCYTE [DISTWIDTH] IN BLOOD BY AUTOMATED COUNT: 11.2 % (ref 12.3–15.4)
GFR SERPL CREATININE-BSD FRML MDRD: 107 ML/MIN/1.73
GFR SERPL CREATININE-BSD FRML MDRD: 88 ML/MIN/1.73
GLOBULIN UR ELPH-MCNC: 3.8 GM/DL
GLUCOSE BLDC GLUCOMTR-MCNC: 159 MG/DL (ref 70–130)
GLUCOSE SERPL-MCNC: 160 MG/DL (ref 65–99)
GLUCOSE UR STRIP-MCNC: ABNORMAL MG/DL
HCT VFR BLD AUTO: 44.3 % (ref 34–46.6)
HGB BLD-MCNC: 14.9 G/DL (ref 12–15.9)
HGB UR QL STRIP.AUTO: NEGATIVE
HOLD SPECIMEN: NORMAL
HYALINE CASTS UR QL AUTO: ABNORMAL /LPF
IMM GRANULOCYTES # BLD AUTO: 0.05 10*3/MM3 (ref 0–0.05)
IMM GRANULOCYTES NFR BLD AUTO: 0.5 % (ref 0–0.5)
KETONES UR QL STRIP: NEGATIVE
LEUKOCYTE ESTERASE UR QL STRIP.AUTO: ABNORMAL
LYMPHOCYTES # BLD AUTO: 3.16 10*3/MM3 (ref 0.7–3.1)
LYMPHOCYTES NFR BLD AUTO: 28.5 % (ref 19.6–45.3)
MCH RBC QN AUTO: 30.2 PG (ref 26.6–33)
MCHC RBC AUTO-ENTMCNC: 33.6 G/DL (ref 31.5–35.7)
MCV RBC AUTO: 89.7 FL (ref 79–97)
MONOCYTES # BLD AUTO: 0.57 10*3/MM3 (ref 0.1–0.9)
MONOCYTES NFR BLD AUTO: 5.1 % (ref 5–12)
MUCOUS THREADS URNS QL MICRO: ABNORMAL /HPF
NEUTROPHILS NFR BLD AUTO: 63.6 % (ref 42.7–76)
NEUTROPHILS NFR BLD AUTO: 7.03 10*3/MM3 (ref 1.7–7)
NITRITE UR QL STRIP: NEGATIVE
NRBC BLD AUTO-RTO: 0 /100 WBC (ref 0–0.2)
PH UR STRIP.AUTO: <=5 [PH] (ref 5–8)
PLATELET # BLD AUTO: 346 10*3/MM3 (ref 140–450)
PMV BLD AUTO: 10.1 FL (ref 6–12)
POTASSIUM SERPL-SCNC: 3.7 MMOL/L (ref 3.5–5.2)
PROT SERPL-MCNC: 8.4 G/DL (ref 6–8.5)
PROT UR QL STRIP: ABNORMAL
RBC # BLD AUTO: 4.94 10*6/MM3 (ref 3.77–5.28)
RBC # UR: ABNORMAL /HPF
REF LAB TEST METHOD: ABNORMAL
SODIUM SERPL-SCNC: 139 MMOL/L (ref 136–145)
SP GR UR STRIP: 1.02 (ref 1–1.03)
SQUAMOUS #/AREA URNS HPF: ABNORMAL /HPF
URATE CRY URNS QL MICRO: ABNORMAL /HPF
UROBILINOGEN UR QL STRIP: ABNORMAL
WBC # BLD AUTO: 11.07 10*3/MM3 (ref 3.4–10.8)
WBC UR QL AUTO: ABNORMAL /HPF
WHOLE BLOOD HOLD SPECIMEN: NORMAL
WHOLE BLOOD HOLD SPECIMEN: NORMAL

## 2021-11-02 PROCEDURE — 85025 COMPLETE CBC W/AUTO DIFF WBC: CPT

## 2021-11-02 PROCEDURE — 81001 URINALYSIS AUTO W/SCOPE: CPT | Performed by: EMERGENCY MEDICINE

## 2021-11-02 PROCEDURE — 82962 GLUCOSE BLOOD TEST: CPT

## 2021-11-02 PROCEDURE — 99283 EMERGENCY DEPT VISIT LOW MDM: CPT

## 2021-11-02 PROCEDURE — 82010 KETONE BODYS QUAN: CPT

## 2021-11-02 PROCEDURE — 80053 COMPREHEN METABOLIC PANEL: CPT | Performed by: EMERGENCY MEDICINE

## 2021-11-02 RX ORDER — SODIUM CHLORIDE 0.9 % (FLUSH) 0.9 %
10 SYRINGE (ML) INJECTION AS NEEDED
Status: DISCONTINUED | OUTPATIENT
Start: 2021-11-02 | End: 2021-11-02 | Stop reason: HOSPADM

## 2021-11-02 NOTE — TELEPHONE ENCOUNTER
PT CALLED IN TO SCHEDULE HOSP F/U AFTER STROKE.  WHILE ON PHONE W/ PT, SHE STATED SHE WAS RECEIVING A CALL FROM HER DOCTOR AND WOULD CALL BACK AND DISCONNECTED CALL.      WAS NOT ABLE TO RELAY INFO THAT SHE WAS ALREADY SCHEDULED FOR F/U W/ DR. GALINDO IN JAN.

## 2021-11-02 NOTE — TELEPHONE ENCOUNTER
PT CALLED SAID HER BLOOD SUGAR IS IN 'S AND IS BLEEDING WHEN SHE GOES TO THE RESTROOM SHE SAID YOU COULD LEAVE HER A MESSAGE IF SHE DOES NOT ANSWER THE PHONE

## 2021-11-02 NOTE — DISCHARGE INSTRUCTIONS
monitor your blood pressure.  Monitor your blood glucose.  Follow-up with your primary care physician.

## 2021-11-02 NOTE — ED PROVIDER NOTES
Subjective   Shital Guzmán is a 56 yr old female that presents the emergency department with varying complaints.  Patient advises that her sugar has been elevated.  She said it is around 311 today at home.  It is now 156.  She reports continued bleeding when she has bowel movements she advises she has a hemorrhoid.  She denies straining for a bowel movement she denies any discomfort.  Patient's hemoglobin is 14.  Patient reports that there has been concerned that her blood pressure has been elevated.  Her blood pressure at today's visit is 132/86.  Patient denies any chest pain or shortness of breath.  Negative for nausea, vomiting.  She denies any headaches, dizziness.  Negative for urinary frequency, burning, urgency.  Patient is in no acute distress.  Patient is currently talking on the phone.      History provided by:  Patient   used: No    Illness  Timing:  Unable to specify  Associated symptoms: no abdominal pain, no chest pain, no cough, no diarrhea, no fever, no headaches, no nausea, no shortness of breath and no vomiting        Review of Systems   Constitutional: Negative for fever.   Respiratory: Negative for cough and shortness of breath.    Cardiovascular: Negative for chest pain.   Gastrointestinal: Negative for abdominal pain, diarrhea, nausea and vomiting.   Genitourinary: Negative for difficulty urinating and dysuria.   Neurological: Negative for headaches.   All other systems reviewed and are negative.      Past Medical History:   Diagnosis Date   • Anemia    • Colon polyp 2014   • Essential hypertension 2011   • GERD (gastroesophageal reflux disease)    • H/O gonorrhea 10/2016   • High cholesterol 2015   • Intramural and subserous leiomyoma of uterus 2018   • Uterine fibroid 2008       Allergies   Allergen Reactions   • Carvedilol Hives   • Hydrochlorothiazide Hives       Past Surgical History:   Procedure Laterality Date   • D & C HYSTEROSCOPY  09/18/2018    path was benign   •  LAPAROSCOPIC APPENDECTOMY  2018   • LAPAROSCOPIC TUBAL LIGATION  1988       Family History   Problem Relation Age of Onset   • Cancer Mother    • Ovarian cancer Mother        Social History     Socioeconomic History   • Marital status: Single   Tobacco Use   • Smoking status: Never Smoker   • Smokeless tobacco: Never Used   Vaping Use   • Vaping Use: Never used   Substance and Sexual Activity   • Alcohol use: No   • Drug use: Never   • Sexual activity: Defer           Objective   Physical Exam  Vitals and nursing note reviewed.   Constitutional:       Appearance: Normal appearance. She is well-developed. She is not toxic-appearing.   HENT:      Head: Normocephalic and atraumatic.      Nose: Nose normal.      Mouth/Throat:      Mouth: Mucous membranes are moist.   Eyes:      General: Lids are normal.      Extraocular Movements: Extraocular movements intact.      Conjunctiva/sclera: Conjunctivae normal.      Pupils: Pupils are equal, round, and reactive to light.   Neck:      Trachea: Trachea normal.   Cardiovascular:      Rate and Rhythm: Regular rhythm.      Pulses: Normal pulses.      Heart sounds: Normal heart sounds.   Pulmonary:      Effort: Pulmonary effort is normal. No respiratory distress.      Breath sounds: Normal breath sounds. No decreased breath sounds, wheezing, rhonchi or rales.   Abdominal:      General: Bowel sounds are normal.      Palpations: Abdomen is soft.      Tenderness: There is no abdominal tenderness.   Musculoskeletal:         General: Normal range of motion.      Cervical back: Full passive range of motion without pain and normal range of motion.   Skin:     General: Skin is warm and dry.      Findings: No rash.   Neurological:      Mental Status: She is alert and oriented to person, place, and time.      Cranial Nerves: No cranial nerve deficit.   Psychiatric:         Speech: Speech normal.         Behavior: Behavior normal. Behavior is cooperative.         Procedures           ED  Course  ED Course as of 11/02/21 2031 Tue Nov 02, 2021   1554 Hemoglobin: 14.9 [KG]   1629 Hematocrit: 44.3 [KG]   1629 Glucose(!): 159 [KG]   1703 Leukocytes, UA(!): Moderate (2+) [KG]   1752 Bacteria, UA(!): 1+ [KG]   1752 WBC, UA(!): 6-12 [KG]   1752 Squamous Epithelial Cells, UA(!): 13-20 [KG]   1752 Leukocytes, UA(!): Moderate (2+) [KG]   1754 Urine will be sent for culture.  Patient will be discharged home.  Patient to monitor her blood pressure.  Patient to monitor her blood glucose.  Patient agrees with treatment plan and follow-up plan. [KG]      ED Course User Index  [KG] Arelis Jones APRN                Recent Results (from the past 24 hour(s))   Comprehensive Metabolic Panel    Collection Time: 11/02/21  2:37 PM    Specimen: Blood   Result Value Ref Range    Glucose 160 (H) 65 - 99 mg/dL    BUN 14 6 - 20 mg/dL    Creatinine 0.69 0.57 - 1.00 mg/dL    Sodium 139 136 - 145 mmol/L    Potassium 3.7 3.5 - 5.2 mmol/L    Chloride 103 98 - 107 mmol/L    CO2 22.0 22.0 - 29.0 mmol/L    Calcium 10.0 8.6 - 10.5 mg/dL    Total Protein 8.4 6.0 - 8.5 g/dL    Albumin 4.60 3.50 - 5.20 g/dL    ALT (SGPT) 35 (H) 1 - 33 U/L    AST (SGOT) 21 1 - 32 U/L    Alkaline Phosphatase 89 39 - 117 U/L    Total Bilirubin 0.6 0.0 - 1.2 mg/dL    eGFR Non African Amer 88 >60 mL/min/1.73    eGFR  African Amer 107 >60 mL/min/1.73    Globulin 3.8 gm/dL    A/G Ratio 1.2 g/dL    BUN/Creatinine Ratio 20.3 7.0 - 25.0    Anion Gap 14.0 5.0 - 15.0 mmol/L   Green Top (Gel)    Collection Time: 11/02/21  2:37 PM   Result Value Ref Range    Extra Tube Hold for add-ons.    Lavender Top    Collection Time: 11/02/21  2:37 PM   Result Value Ref Range    Extra Tube hold for add-on    Gold Top - SST    Collection Time: 11/02/21  2:37 PM   Result Value Ref Range    Extra Tube Hold for add-ons.    Gray Top    Collection Time: 11/02/21  2:37 PM   Result Value Ref Range    Extra Tube Hold for add-ons.    Light Blue Top    Collection Time: 11/02/21  2:37  PM   Result Value Ref Range    Extra Tube hold for add-on    CBC Auto Differential    Collection Time: 11/02/21  2:37 PM    Specimen: Blood   Result Value Ref Range    WBC 11.07 (H) 3.40 - 10.80 10*3/mm3    RBC 4.94 3.77 - 5.28 10*6/mm3    Hemoglobin 14.9 12.0 - 15.9 g/dL    Hematocrit 44.3 34.0 - 46.6 %    MCV 89.7 79.0 - 97.0 fL    MCH 30.2 26.6 - 33.0 pg    MCHC 33.6 31.5 - 35.7 g/dL    RDW 11.2 (L) 12.3 - 15.4 %    RDW-SD 36.7 (L) 37.0 - 54.0 fl    MPV 10.1 6.0 - 12.0 fL    Platelets 346 140 - 450 10*3/mm3    Neutrophil % 63.6 42.7 - 76.0 %    Lymphocyte % 28.5 19.6 - 45.3 %    Monocyte % 5.1 5.0 - 12.0 %    Eosinophil % 1.8 0.3 - 6.2 %    Basophil % 0.5 0.0 - 1.5 %    Immature Grans % 0.5 0.0 - 0.5 %    Neutrophils, Absolute 7.03 (H) 1.70 - 7.00 10*3/mm3    Lymphocytes, Absolute 3.16 (H) 0.70 - 3.10 10*3/mm3    Monocytes, Absolute 0.57 0.10 - 0.90 10*3/mm3    Eosinophils, Absolute 0.20 0.00 - 0.40 10*3/mm3    Basophils, Absolute 0.06 0.00 - 0.20 10*3/mm3    Immature Grans, Absolute 0.05 0.00 - 0.05 10*3/mm3    nRBC 0.0 0.0 - 0.2 /100 WBC   Beta Hydroxybutyrate Quantitative    Collection Time: 11/02/21  2:37 PM    Specimen: Blood   Result Value Ref Range    Beta-Hydroxybutyrate Quant 0.070 0.020 - 0.270 mmol/L   POC Glucose Once    Collection Time: 11/02/21  2:38 PM    Specimen: Blood   Result Value Ref Range    Glucose 159 (H) 70 - 130 mg/dL   Urinalysis With Microscopic If Indicated (No Culture) - Urine, Clean Catch    Collection Time: 11/02/21  4:58 PM    Specimen: Urine, Clean Catch   Result Value Ref Range    Color, UA Yellow Yellow, Straw    Appearance, UA Cloudy (A) Clear    pH, UA <=5.0 5.0 - 8.0    Specific Gravity, UA 1.022 1.001 - 1.030    Glucose,  mg/dL (Trace) (A) Negative    Ketones, UA Negative Negative    Bilirubin, UA Negative Negative    Blood, UA Negative Negative    Protein, UA Trace (A) Negative    Leuk Esterase, UA Moderate (2+) (A) Negative    Nitrite, UA Negative Negative     "Urobilinogen, UA 0.2 E.U./dL 0.2 - 1.0 E.U./dL   Urinalysis, Microscopic Only - Urine, Clean Catch    Collection Time: 11/02/21  4:58 PM    Specimen: Urine, Clean Catch   Result Value Ref Range    RBC, UA None Seen None Seen, 0-2 /HPF    WBC, UA 6-12 (A) None Seen, 0-2 /HPF    Bacteria, UA 1+ (A) None Seen, Trace /HPF    Squamous Epithelial Cells, UA 13-20 (A) None Seen, 0-2 /HPF    Hyaline Casts, UA 0-6 0 - 6 /LPF    Uric Acid Crystals, UA Small/1+ None Seen /HPF    Mucus, UA Small/1+ (A) None Seen, Trace /HPF    Methodology Manual Light Microscopy      Note: In addition to lab results from this visit, the labs listed above may include labs taken at another facility or during a different encounter within the last 24 hours. Please correlate lab times with ED admission and discharge times for further clarification of the services performed during this visit.    No orders to display     Vitals:    11/02/21 1427   BP: 132/86   BP Location: Left arm   Patient Position: Sitting   Pulse: 91   Resp: 20   Temp: 98.2 °F (36.8 °C)   TempSrc: Oral   SpO2: 98%   Weight: 77.1 kg (170 lb)   Height: 157.5 cm (62\")     Medications - No data to display  ECG/EMG Results (last 24 hours)     ** No results found for the last 24 hours. **        No orders to display                                  MDM    Final diagnoses:   Uncontrolled type 2 diabetes mellitus with hyperglycemia (HCC)   Uncontrolled hypertension       ED Disposition  ED Disposition     ED Disposition Condition Comment    Discharge Stable Patient discharged to home. Patient paperwork completed, copies given, vitals obtained, IV removed. Patient in no obvious distress at time of discharge.           Ela Brown, PA-C  [address] [facility] [address]  [place] [id number] [phone]               Medication List      No changes were made to your prescriptions during this visit.          Arelis Jones, APRN  11/02/21 2031    "

## 2021-11-02 NOTE — PLAN OF CARE
"Shital Gumzán attended follow up outpatient diabetes management education class in person 45 minute class. Educated on basic diabetes pathophysiology and how related to increased risk for complications, specifically stroke. Discussed s/sx of hyperglycemia and hypoglycemia and self management of both. Education provided on TLC diet, the plate method, as well as exercise recommendations. Stressed importance of ongoing, lifelong follow up with PCP for diabetes and other chronic health condition management. Stressed importance of taking all medications as prescribed. Patient was given opportunity to ask questions, and was also encouraged to pursue further diabetes education class with both RN and RD. Class educational materials were provided to provided; [facility]'s \"Diabetes Basics\" booklet, as well as our contact information for any further questions or needs.   "

## 2021-11-02 NOTE — TELEPHONE ENCOUNTER
Caller: Shital Guzmán    Relationship: Self    Best call back number: [phone]    What medications are you currently taking:   Current Outpatient Medications on File Prior to Visit   Medication Sig Dispense Refill   • amLODIPine (NORVASC) 10 MG tablet Take 1 tablet by mouth Daily. For BP 30 tablet 11   • aspirin 81 MG chewable tablet Chew 1 tablet Daily for 30 days. 30 tablet 1   • atorvastatin (LIPITOR) 80 MG tablet Take 1 tablet by mouth Daily for cholesterol 30 tablet 11   • clopidogrel (PLAVIX) 75 MG tablet Take 1 tablet by mouth Daily for 21 days. 21 tablet 0   • glucose blood (FREESTYLE LITE) test strip Use as instructed 4 times a day 100 each 1   • glucose blood (OneTouch Verio) test strip Use as instructed four times daily 100 each 1   • Blood Glucose Monitoring Suppl (FreeStyle Lite) w/Device kit 1 each Daily. 1 kit 0   • Insulin Glargine (LANTUS SOLOSTAR) 100 UNIT/ML injection pen Inject 10 Units under the skin into the appropriate area as directed Every Night for 30 days. For diabetes 15 mL 2   • Insulin Pen Needle 31G X 5 MM misc Use 1 pen needle as directed daily with insulin 100 each 0   • Lancets (freestyle) lancets Use as instructed 4 times a day 100 each 0   • loratadine (CLARITIN) 10 MG tablet Take 1 tablet by mouth Daily. 30 tablet 5   • losartan (COZAAR) 25 MG tablet Take 1 tablet by mouth Daily. 30 tablet 11   • metFORMIN ER (GLUCOPHAGE-XR) 500 MG 24 hr tablet Take 1 tablet by mouth Daily With Dinner. 60 tablet 2   • triamcinolone (KENALOG) 0.1 % ointment Used twice daily for 2 weeks and then taper to once daily for 2 weeks.  If itching remains well controlled using every other day as needed. 80 g 1     No current facility-administered medications on file prior to visit.          When did you start taking these medications: NA    Which medication are you concerned about: METFORMIN     Who prescribed you this medication: J BLUES    What are your concerns: SHE SAID SHE HAD AN APPT THIS MORNING  WITH DIABETIC EDUCATOR AND SAID THAT SHE NEEDED TO CONTACT THE OFFICE REGARDING HER SUGAR STILL BEING ELEVATED; SHE SAID HER SUGAR  AFTER EATING A PIECE OF BREAD; SHE SAID WHILE SHE IS IN THE HOSPITAL, IT WOULD STAY BETWEEN 240-300 RANGE; SHE SAID THE METFORMIN MAKES HER HUNGRY, BLOATING, BLEEDING ALSO FROM RECTUM--SHE SAYS SHE HAS A HEMMOROID BUT EVERY TIME SHE GOES TO THE BATHROOM, SHE BLEEDS; SHE HAS HAD POLYPS REMOVED A FEW WEEKS AGO...SHE IS ALSO ON ASPIRIN     PLEASE CALL TO ADVISE

## 2021-11-03 ENCOUNTER — TRANSCRIBE ORDERS (OUTPATIENT)
Dept: FAMILY MEDICINE CLINIC | Facility: CLINIC | Age: 56
End: 2021-11-03

## 2021-11-03 ENCOUNTER — TELEPHONE (OUTPATIENT)
Dept: FAMILY MEDICINE CLINIC | Facility: CLINIC | Age: 56
End: 2021-11-03

## 2021-11-03 ENCOUNTER — READMISSION MANAGEMENT (OUTPATIENT)
Dept: CALL CENTER | Facility: HOSPITAL | Age: 56
End: 2021-11-03

## 2021-11-03 DIAGNOSIS — E11.649 UNCONTROLLED TYPE 2 DIABETES MELLITUS WITH HYPOGLYCEMIA WITHOUT COMA (HCC): Primary | ICD-10-CM

## 2021-11-03 NOTE — TELEPHONE ENCOUNTER
----- Message from Ela Brown PA-C sent at 11/3/2021  1:25 PM EDT -----  Regarding: RE: DM ed referral  [place], could you please pend this referral for me? I'm not sure the correct way to order it.  ----- Message -----  From: Birgit Beltrán  Sent: 11/2/2021   3:12 PM EDT  To: Ela Brown PA-C  Subject: DM ed referral                                   Good afternoon,    We saw this pt of yours this morning for a diabetes stroke follow up class, but pt is interested in attending comprehensive diabetes education with both RN and RD. Do you care to place a referral for her, please?

## 2021-11-03 NOTE — OUTREACH NOTE
Stroke Week 2 Survey      Responses   [facility] patient discharged from? [place]   Does the patient have one of the following disease processes/diagnoses(primary or secondary)? Stroke (TIA)   Week 2 attempt successful? Yes   Call start time 1029   Call end time 1036   Discharge diagnosis Acute stroke due to ischemia    Meds reviewed with patient/caregiver? Yes   Is the patient having any side effects they believe may be caused by any medication additions or changes? No   Does the patient have all medications ordered at discharge? Yes   Is the patient taking all medications as directed (includes completed medication regime)? Yes   Does the patient have a primary care provider?  Yes   Does the patient have an appointment with their PCP within 7 days of discharge? Yes   Has the patient kept scheduled appointments due by today? Yes   Home health comments Outpt PT ordered but can't find opening till December, she reports. Suggested calling PCP to inquire about HH PT until can get into to outpt.   Psychosocial issues? No   Does the patient require any assistance with activities of daily living such as eating, bathing, dressing, walking, etc.? No   Does the patient have any residual symptoms from stroke/TIA? Yes   Residual symptoms comments Left side of body numbness/tingling.Able to use arm/hand normal, walking normally but does affect her balance/stability when walking, she reports. Not using walker/cane. Numbness but no droop on left side face but denies issues swallowing/eating.    Does the patient understand the diet ordered at discharge? Yes   Comments Pt has been seen in ER for high glucose. Pt reports this morning gluc 130 prior to eating.    What is the patient's perception of their health status since discharge? Improving   Nursing interventions Nurse provided patient education   Is the patient able to teach back FAST for Stroke? Yes   Is the patient/caregiver able to teach back the risk factors for a  stroke? High blood pressure-goal below 120/80,  Diabetes   Is the patient/caregiver able to teach back signs and symptoms related to disease process for when to call PCP? Yes   If the patient is a current smoker, are they able to teach back resources for cessation? Not a smoker   Is the patient/caregiver able to teach back the hierarchy of who to call/visit for symptoms/problems? PCP, Specialist, Home health nurse, Urgent Care, ED, 911 Yes   Week 2 call completed? Yes          Marianna Ellsworth RN

## 2021-11-04 ENCOUNTER — TELEPHONE (OUTPATIENT)
Dept: CARDIOLOGY | Facility: CLINIC | Age: 56
End: 2021-11-04

## 2021-11-05 ENCOUNTER — TELEPHONE (OUTPATIENT)
Dept: FAMILY MEDICINE CLINIC | Facility: CLINIC | Age: 56
End: 2021-11-05

## 2021-11-05 LAB
QT INTERVAL: 374 MS
QTC INTERVAL: 460 MS

## 2021-11-08 NOTE — TELEPHONE ENCOUNTER
She does not need her insulin level increased at this point.  She needs to continue on the dosage I prescribed her until she seen endocrinology , that may not be another month.

## 2021-11-08 NOTE — TELEPHONE ENCOUNTER
Looks like referral hub has tried to call patient multiple times to get this scheduled and left voicemails. I also left a voicemail and advised patient to check messages and call back to get this scheduled

## 2021-11-09 ENCOUNTER — TREATMENT (OUTPATIENT)
Dept: PHYSICAL THERAPY | Facility: CLINIC | Age: 56
End: 2021-11-09

## 2021-11-09 DIAGNOSIS — I63.9 ACUTE STROKE DUE TO ISCHEMIA (HCC): Primary | ICD-10-CM

## 2021-11-09 PROCEDURE — 97161 PT EVAL LOW COMPLEX 20 MIN: CPT | Performed by: PHYSICAL THERAPIST

## 2021-11-09 NOTE — PROGRESS NOTES
Physical Therapy Initial Evaluation and Plan of Care/Discharge Summary       Patient: Shital Guzmán   : 1965  Diagnosis/ICD-10 Code:  Acute stroke due to ischemia (HCC) [I63.9]  Referring practitioner: Ayla Reyes MD  Date of Initial Visit: 2021  Today's Date: 2021  Patient seen for 1 sessions      Subjective:     Subjective Evaluation    History of Present Illness  Mechanism of injury: Patient reports she had a stroke from drug, hypertension and diabetes are uncontrolled. Her complaints are numbness and tingling throughout the L side of her body. She reports needing medication to fix the N/T so she can sleep       Patient Occupation: unemployed - no plans to work  Pain  Current pain rating: 10  At best pain rating: 10  At worst pain rating: 10  Location: L side of body        SLS: R and L > 20 sec       Objective          Strength/Myotome Testing     Left Hip   Planes of Motion   Flexion: 5  Extension: 4+  Abduction: 5    Right Hip   Planes of Motion   Flexion: 5  Extension: 4+  Abduction: 5    Left Knee   Flexion: 5  Extension: 5    Right Knee   Flexion: 5  Extension: 5    Left Ankle/Foot   Dorsiflexion: 5    Right Ankle/Foot   Dorsiflexion: 5    Ambulation     Comments   Normal gait pattern     Functional Assessment     Comments  Pt is able to feel light touch as well as deep pressure throughout her L side.         PT Neuro         Assessment & Plan     Assessment  Assessment details: Patient is a 56 YOF that presents to OPPT with complaints of N/T due to recent CVA. Patient was requesting medication for the N/T, however it was explained that PT is unable to provide that. She states she was told otherwise. She demonstrated normal strength and balance, as well as no functional deficits. She is able to walk 2 miles per day. Pt does not seem to need PT services and was referred back to PCP or neurologist to discuss medication for her sensory issues.     Plan  Plan details: Patient will be  discharged from OPPT services.         Timed:  Manual Therapy:    0     mins  03518;  Therapeutic Exercise:    0     mins  67217;     Neuromuscular [place]:    0    mins  60025;    Therapeutic Activity:     0     mins  96118;     Gait Trainin     mins  58717;     Electrical Stimulation:    0     mins  33545 ( );    Untimed:  [place] Repositioning    0     mins 42991    Timed Treatment:   0   mins   Total Treatment:     30   mins    PT SIGNATURE: Linda Astudillo, PT, DPT, MSCS, CDP  [place] License #[id number]  DATE TREATMENT INITIATED: 2021      Initial Certification Certification Period: 2021thru2022  I certify that the therapy services are furnished while this patient is under my care.  The services outlined above are required by this patient, and will be reviewed every 90 days.     PHYSICIAN: yAla Reyes MD      DATE:     Please sign and return via fax to [phone].   Thank you,   [facility].

## 2021-11-10 ENCOUNTER — READMISSION MANAGEMENT (OUTPATIENT)
Dept: CALL CENTER | Facility: HOSPITAL | Age: 56
End: 2021-11-10

## 2021-11-10 NOTE — OUTREACH NOTE
Stroke Week 3 Survey      Responses   [facility] patient discharged from? [place]   Does the patient have one of the following disease processes/diagnoses(primary or secondary)? Stroke (TIA)   Week 3 attempt successful? No   Unsuccessful attempts Attempt 1          Marianna Ellsworth RN

## 2021-11-15 ENCOUNTER — READMISSION MANAGEMENT (OUTPATIENT)
Dept: CALL CENTER | Facility: HOSPITAL | Age: 56
End: 2021-11-15

## 2021-11-15 NOTE — OUTREACH NOTE
Stroke Week 3 Survey      Responses   [facility] patient discharged from? [place]   Does the patient have one of the following disease processes/diagnoses(primary or secondary)? Stroke (TIA)   Week 3 attempt successful? Yes   Call start time 1702   Call end time 1708   Discharge diagnosis Acute stroke due to ischemia    Meds reviewed with patient/caregiver? Yes   Is the patient having any side effects they believe may be caused by any medication additions or changes? No   Does the patient have all medications ordered at discharge? Yes   Is the patient taking all medications as directed (includes completed medication regime)? Yes   Does the patient have a primary care provider?  Yes   Does the patient have an appointment with their PCP within 7 days of discharge? Yes   Has the patient kept scheduled appointments due by today? Yes   Has home health visited the patient within 72 hours of discharge? N/A   Psychosocial issues? No   Does the patient require any assistance with activities of daily living such as eating, bathing, dressing, walking, etc.? No   Does the patient have any residual symptoms from stroke/TIA? Yes   Residual symptoms comments numbness   Does the patient understand the diet ordered at discharge? Yes   Did the patient receive a copy of their discharge instructions? Yes   Nursing interventions Reviewed instructions with patient   What is the patient's perception of their health status since discharge? Improving   Nursing interventions Nurse provided patient education   Is the patient able to teach back FAST for Stroke? Yes   Is the patient/caregiver able to teach back the risk factors for a stroke? High blood pressure-goal below 120/80,  Carotid or other artery disease,  History of TIAs   Is the patient/caregiver able to teach back signs and symptoms related to disease process for when to call PCP? Yes   Is the patient/caregiver able to teach back signs and symptoms related to disease process  for when to call 911? Yes   Is the patient/caregiver able to teach back the hierarchy of who to call/visit for symptoms/problems? PCP, Specialist, Home health nurse, Urgent Care, ED, 911 Yes   Week 3 call completed? Yes          Nohemy Walsh RN

## 2021-11-16 ENCOUNTER — OFFICE VISIT (OUTPATIENT)
Dept: NEUROLOGY | Facility: CLINIC | Age: 56
End: 2021-11-16

## 2021-11-16 VITALS — OXYGEN SATURATION: 99 % | HEIGHT: 62 IN | HEART RATE: 85 BPM | WEIGHT: 170 LBS | BODY MASS INDEX: 31.28 KG/M2

## 2021-11-16 DIAGNOSIS — Z86.73 HISTORY OF STROKE: Primary | ICD-10-CM

## 2021-11-16 PROCEDURE — 99204 OFFICE O/P NEW MOD 45 MIN: CPT | Performed by: PSYCHIATRY & NEUROLOGY

## 2021-11-16 RX ORDER — ATORVASTATIN CALCIUM 80 MG/1
80 TABLET, FILM COATED ORAL NIGHTLY
Qty: 30 TABLET | Refills: 0 | OUTPATIENT
Start: 2021-11-16 | End: 2021-12-16

## 2021-11-16 RX ORDER — LOSARTAN POTASSIUM 25 MG/1
25 TABLET ORAL
Qty: 30 TABLET | Refills: 0 | Status: CANCELLED | OUTPATIENT
Start: 2021-11-16 | End: 2021-12-16

## 2021-11-16 RX ORDER — CLOPIDOGREL BISULFATE 75 MG/1
75 TABLET ORAL DAILY
Qty: 21 TABLET | Refills: 0 | OUTPATIENT
Start: 2021-11-16 | End: 2021-12-07

## 2021-11-23 ENCOUNTER — READMISSION MANAGEMENT (OUTPATIENT)
Dept: CALL CENTER | Facility: HOSPITAL | Age: 56
End: 2021-11-23

## 2021-11-23 NOTE — OUTREACH NOTE
Stroke Week 4 Survey      Responses   [facility] patient discharged from? [place]   Does the patient have one of the following disease processes/diagnoses(primary or secondary)? Stroke (TIA)   Week 4 attempt successful? No   Rescheduled Revoked   Revoke Decline to participate          Tisha Malik RN

## 2021-12-17 ENCOUNTER — TELEPHONE (OUTPATIENT)
Dept: FAMILY MEDICINE CLINIC | Facility: CLINIC | Age: 56
End: 2021-12-17

## 2021-12-17 RX ORDER — LANCETS 28 GAUGE
EACH MISCELLANEOUS
Qty: 100 EACH | Refills: 0 | Status: SHIPPED | OUTPATIENT
Start: 2021-12-17 | End: 2023-01-31 | Stop reason: SDUPTHER

## 2021-12-17 RX ORDER — ASPIRIN 81 MG/1
81 TABLET, CHEWABLE ORAL DAILY
Qty: 90 TABLET | Refills: 0 | Status: SHIPPED | OUTPATIENT
Start: 2021-12-17 | End: 2022-04-04

## 2021-12-17 RX ORDER — ATORVASTATIN CALCIUM 80 MG/1
80 TABLET, FILM COATED ORAL DAILY
Qty: 90 TABLET | Refills: 0 | Status: SHIPPED | OUTPATIENT
Start: 2021-12-17 | End: 2022-08-10

## 2021-12-17 RX ORDER — METFORMIN HYDROCHLORIDE 500 MG/1
500 TABLET, EXTENDED RELEASE ORAL
Qty: 180 TABLET | Refills: 0 | Status: SHIPPED | OUTPATIENT
Start: 2021-12-17 | End: 2022-09-14

## 2021-12-17 RX ORDER — LOSARTAN POTASSIUM 25 MG/1
25 TABLET ORAL DAILY
Qty: 90 TABLET | Refills: 0 | Status: SHIPPED | OUTPATIENT
Start: 2021-12-17 | End: 2022-11-09

## 2021-12-17 NOTE — TELEPHONE ENCOUNTER
I saw patient in October I clearly explained her that we would need to refer to endocrinology for uncontrolled diabetes it does not appear that she has got that appointment do not see any notes from endocrinology or any pending appointments.  I also see that she has no showed her appointment with cardiology, physical therapy and some with neurology.  I will refill medications for 3-month supply the patient needs to schedule to come in to see me within the next 4 to 6 weeks so we can discuss this in more detail regarding her need to keep her appointments with her specialists and be compliant with her health care as prescribed.  Please make sure patient schedules an appointment with me within the next 4 to 6 weeks and please look into what is going on regarding the referral I placed for endocrinology.    Routing comment

## 2021-12-17 NOTE — TELEPHONE ENCOUNTER
Caller: Shital Guzmán    Relationship: Self    Best call back number: [phone]    Requested Prescriptions:   Requested Prescriptions     Pending Prescriptions Disp Refills   • metFORMIN ER (GLUCOPHAGE-XR) 500 MG 24 hr tablet 60 tablet 2     Sig: Take 1 tablet by mouth Daily With Dinner.   • losartan (COZAAR) 25 MG tablet 30 tablet 11     Sig: Take 1 tablet by mouth Daily.   • atorvastatin (LIPITOR) 80 MG tablet 30 tablet 11     Sig: Take 1 tablet by mouth Daily for cholesterol   • Insulin Glargine (LANTUS SOLOSTAR) 100 UNIT/ML injection pen 15 mL 2     Sig: Inject 10 Units under the skin into the appropriate area as directed Every Night for 30 days. For diabetes   • Insulin Pen Needle 31G X 5 MM misc 100 each 0     Sig: Use 1 pen needle as directed daily with insulin   • Lancets (freestyle) lancets 100 each 0     Sig: Use as instructed 4 times a day   • aspirin 81 MG chewable tablet 30 tablet 1     Sig: Chew 1 tablet Daily for 30 days.        Pharmacy where request should be sent: [facility]ACY #[id number] [address] [phone] [facility] [phone]      Additional details provided by patient: PATIENT IS REQUESTING A 90 DAY SUPPLY ON ALL THE MEDICATIONS    Does the patient have less than a 3 day supply:  [x] Yes  [] No    Juan J Hui Rep   12/17/21 10:52 EST

## 2022-04-04 RX ORDER — ASPIRIN 81 MG
TABLET,CHEWABLE ORAL
Qty: 90 TABLET | Refills: 0 | Status: SHIPPED | OUTPATIENT
Start: 2022-04-04 | End: 2022-07-22

## 2022-04-18 RX ORDER — FLURBIPROFEN SODIUM 0.3 MG/ML
SOLUTION/ DROPS OPHTHALMIC
Qty: 100 EACH | Refills: 0 | Status: SHIPPED | OUTPATIENT
Start: 2022-04-18 | End: 2022-08-10

## 2022-05-16 ENCOUNTER — OFFICE VISIT (OUTPATIENT)
Dept: NEUROLOGY | Facility: CLINIC | Age: 57
End: 2022-05-16

## 2022-05-16 VITALS
RESPIRATION RATE: 16 BRPM | SYSTOLIC BLOOD PRESSURE: 158 MMHG | BODY MASS INDEX: 30.69 KG/M2 | WEIGHT: 166.8 LBS | HEART RATE: 56 BPM | OXYGEN SATURATION: 99 % | DIASTOLIC BLOOD PRESSURE: 92 MMHG | HEIGHT: 62 IN

## 2022-05-16 DIAGNOSIS — Z86.73 HISTORY OF STROKE: Primary | ICD-10-CM

## 2022-05-16 PROCEDURE — 99214 OFFICE O/P EST MOD 30 MIN: CPT | Performed by: PSYCHIATRY & NEUROLOGY

## 2022-05-16 RX ORDER — CYCLOBENZAPRINE HCL 10 MG
10 TABLET ORAL NIGHTLY
Qty: 30 TABLET | Refills: 3 | Status: SHIPPED | OUTPATIENT
Start: 2022-05-16 | End: 2022-11-09

## 2022-05-16 NOTE — PROGRESS NOTES
Subjective:    CC: Shital Guzmán is in clinic today for follow up for history of right MCA and right thalamic stroke.    HPI:  Initial visit: 11/16/2021: Patient is a 56-year-old female with past medical history of hypertension, hyperlipidemia, type 2 diabetes, history of cocaine use presented to [facility] in October with complaint of sudden onset of left hemibody numbness.  She called 911 and was brought to [facility] ED.  After arrival, she underwent MRI brain without contrast which I reviewed personally and it showed 2 tiny areas of acute ischemia involving the right thalamus and right periventricular white matter involving the right MCA territory.  MR angiogram of brain and neck was done eventually which did not tell any flow-limiting stenosis or large vessel occlusion.  There is no evidence of aneurysm.  2D echocardiogram was normal as well.  Prior to admission, she reported that she had stopped taking all her medications as she wanted to try nonmedication-herbal supplements to bring her blood sugars and cholesterol levels down.  During her hospital stay, HbA1c was 10.8.  Total cholesterol and LDL levels were found to high as well.  She was started on aspirin 81 mg daily along with Lipitor 80 mg daily for secondary stroke prevention.  Since the discharge, she has been taking all her medications regularly.  She does report residual left hemibody numbness which affects her walking.    Follow-up: 5/16/2022: She is in clinic for regular follow-up.  Since her initial visit in #2021, she reports that she continues to have left homey body paresthesias, numbness which is affected her ability to walk for a prolonged period of time, it has affected her balance and she also reports some weakness involving left side.  She works at [employer] and reports that she has to stay on her feet for prolonged period of time which makes it extremely difficult.  No new strokelike symptoms since starting aspirin, atorvastatin.    The  following portions of the patient's history were reviewed and updated as of 05/16/2022: allergies, social history and problem list.       Current Outpatient Medications:   •  amLODIPine (NORVASC) 10 MG tablet, Take 1 tablet by mouth Daily. For BP, Disp: 30 tablet, Rfl: 11  •  Aspirin Low Dose 81 MG chewable tablet, CHEW 1 TABLET DAILY, Disp: 90 tablet, Rfl: 0  •  atorvastatin (LIPITOR) 80 MG tablet, Take 1 tablet by mouth Daily for cholesterol, Disp: 90 tablet, Rfl: 0  •  B-D UF III MINI PEN NEEDLES 31G X 5 MM misc, USE 1 PEN NEEDLE AS DIRECTED DAILY WITH INSULIN, Disp: 100 each, Rfl: 0  •  Blood Glucose Monitoring Suppl (FreeStyle Lite) w/Device kit, 1 each Daily., Disp: 1 kit, Rfl: 0  •  glucose blood (FREESTYLE LITE) test strip, Use as instructed 4 times a day (Patient taking differently: Use as instructed 4 times a day), Disp: 100 each, Rfl: 1  •  glucose blood (OneTouch Verio) test strip, Use as instructed four times daily (Patient taking differently: Use as instructed four times daily), Disp: 100 each, Rfl: 1  •  Lancets (freestyle) lancets, Use as instructed 4 times a day, Disp: 100 each, Rfl: 0  •  loratadine (CLARITIN) 10 MG tablet, Take 1 tablet by mouth Daily., Disp: 30 tablet, Rfl: 5  •  losartan (COZAAR) 25 MG tablet, Take 1 tablet by mouth Daily., Disp: 90 tablet, Rfl: 0  •  metFORMIN ER (GLUCOPHAGE-XR) 500 MG 24 hr tablet, Take 1 tablet by mouth Daily With Dinner., Disp: 180 tablet, Rfl: 0  •  triamcinolone (KENALOG) 0.1 % ointment, Used twice daily for 2 weeks and then taper to once daily for 2 weeks.  If itching remains well controlled using every other day as needed., Disp: 80 g, Rfl: 1  •  cyclobenzaprine (FLEXERIL) 10 MG tablet, Take 1 tablet by mouth Every Night., Disp: 30 tablet, Rfl: 3  •  Insulin Glargine (LANTUS SOLOSTAR) 100 UNIT/ML injection pen, Inject 10 Units under the skin into the appropriate area as directed Every Night for 30 days. For diabetes, Disp: 45 mL, Rfl: 2   Past Medical  "History:   Diagnosis Date   • Anemia    • Colon polyp 2014   • Essential hypertension 2011   • GERD (gastroesophageal reflux disease)    • H/O gonorrhea 10/2016   • High cholesterol 2015   • Intramural and subserous leiomyoma of uterus 2018   • Uterine fibroid 2008      Past Surgical History:   Procedure Laterality Date   • D & C HYSTEROSCOPY  09/18/2018    path was benign   • LAPAROSCOPIC APPENDECTOMY  2018   • LAPAROSCOPIC TUBAL LIGATION  1988      Family History   Problem Relation Age of Onset   • Cancer Mother    • Ovarian cancer Mother         Review of Systems  Objective:    /92   Pulse 56   Resp 16   Ht 157.5 cm (62\")   Wt 75.7 kg (166 lb 12.8 oz)   SpO2 99%   BMI 30.51 kg/m²     Neurology Exam:  General apperance: NAD.     Mental status: Alert, awake and oriented to time place and person.    Recent and Remote memory: Can recall 3/3 objects at 5 minutes. Can recall historical events.     Attention span and Concentration: Serial 7s: Normal.     Fund of knowledge:  Normal.     Language and Speech: No aphasia or dysarthria.    Naming , Repitition and Comprehension:  Can name objects, repeat a sentence and follow commands. Speech is clear and fluent with good repetition, comprehension, and naming.    CN II to XII: Intact.    Opthalmoscopic Exam: No papilledema.    Motor:  Right UE muscle strength 5/5. Normal tone.     Left UE muscle strength 4/5. Normal tone.      Right LE muscle strength 5/5. Normal tone.     Left LE muscle strength 4/5. Normal tone.      Sensory: Reduced light touch, vibration and pinprick sensation in left hand and left f.    DTRs: 2+ bilaterally.    Babinski: Negative bilaterally.    Co-ordination: Normal finger-to-nose, heel to shin B/L.    Rhomberg: Negative.    Gait: Normal.    Cardiovascular: Regular rate and rhythm without murmur, gallop or rub.    Assessment and Plan:  1. History of stroke  Patient with history of right MCA and right thalamic stroke with residual left " hemibody paresthesias and mild left-sided weakness.  Because of ongoing paresthesias, it is difficult for her to stay on her feet for a prolonged period of time and also it has affected her balance and ability to walk.  I am giving her prescription for massage therapy as she reports that in the past, when she got massage, it did help.  Continue with aspirin 81 mg daily and Lipitor 80 mg daily for secondary stroke prevention and I will see her back in 6 months for follow-up.    I spent 30 minutes in patient care: Reviewing records prior to the visit, entering orders and documentation and spent more than valle 50% of this time face-to-face in management, instructions and education regarding above mentioned diagnosis and also on counseling and discussing about taking medication regularly, possible side effects with medication use, importance of good sleep hygiene, good hydration and regular exercise.    Return in about 6 months (around 11/16/2022).

## 2022-07-22 RX ORDER — ASPIRIN 81 MG
TABLET,CHEWABLE ORAL
Qty: 90 TABLET | Refills: 0 | Status: SHIPPED | OUTPATIENT
Start: 2022-07-22 | End: 2022-09-14

## 2022-08-10 RX ORDER — FLURBIPROFEN SODIUM 0.3 MG/ML
SOLUTION/ DROPS OPHTHALMIC
Qty: 100 EACH | Refills: 0 | Status: SHIPPED | OUTPATIENT
Start: 2022-08-10

## 2022-08-10 RX ORDER — ATORVASTATIN CALCIUM 80 MG/1
TABLET, FILM COATED ORAL
Qty: 90 TABLET | Refills: 0 | Status: SHIPPED | OUTPATIENT
Start: 2022-08-10 | End: 2022-11-02

## 2022-09-14 RX ORDER — LORATADINE 10 MG
TABLET ORAL
Qty: 90 TABLET | Refills: 0 | Status: SHIPPED | OUTPATIENT
Start: 2022-09-14 | End: 2023-01-31 | Stop reason: SDUPTHER

## 2022-09-14 RX ORDER — METFORMIN HYDROCHLORIDE 500 MG/1
TABLET, EXTENDED RELEASE ORAL
Qty: 180 TABLET | Refills: 0 | Status: SHIPPED | OUTPATIENT
Start: 2022-09-14 | End: 2023-01-31 | Stop reason: SDUPTHER

## 2022-10-13 DIAGNOSIS — Z12.31 BREAST CANCER SCREENING BY MAMMOGRAM: Primary | ICD-10-CM

## 2022-11-02 RX ORDER — ATORVASTATIN CALCIUM 80 MG/1
TABLET, FILM COATED ORAL
Qty: 30 TABLET | Refills: 0 | Status: SHIPPED | OUTPATIENT
Start: 2022-11-02 | End: 2022-11-21

## 2022-11-08 DIAGNOSIS — I10 ESSENTIAL HYPERTENSION: ICD-10-CM

## 2022-11-09 RX ORDER — AMLODIPINE BESYLATE 10 MG/1
10 TABLET ORAL DAILY
Qty: 30 TABLET | Refills: 0 | Status: SHIPPED | OUTPATIENT
Start: 2022-11-09 | End: 2022-11-21

## 2022-11-09 RX ORDER — CYCLOBENZAPRINE HCL 10 MG
TABLET ORAL
Qty: 30 TABLET | Refills: 3 | Status: SHIPPED | OUTPATIENT
Start: 2022-11-09 | End: 2023-01-31

## 2022-11-09 RX ORDER — LOSARTAN POTASSIUM 25 MG/1
25 TABLET ORAL DAILY
Qty: 30 TABLET | Refills: 0 | Status: SHIPPED | OUTPATIENT
Start: 2022-11-09 | End: 2023-01-31 | Stop reason: SDUPTHER

## 2022-11-09 NOTE — TELEPHONE ENCOUNTER
Rx Refill Note  Requested Prescriptions     Pending Prescriptions Disp Refills   • cyclobenzaprine (FLEXERIL) 10 MG tablet [Pharmacy Med Name: CYCLOBENZAPRINE 10 MG TABLET] 30 tablet 3     Sig: TAKE 1 TABLET BY MOUTH EVERY DAY AT NIGHT      Last filled:05/16/2022  Last office visit with prescribing clinician: 5/16/2022      Next office visit with prescribing clinician: 11/16/2022     Ginette Best MA  11/09/22, 08:02 EST

## 2022-11-19 DIAGNOSIS — I10 ESSENTIAL HYPERTENSION: ICD-10-CM

## 2022-11-21 RX ORDER — AMLODIPINE BESYLATE 10 MG/1
10 TABLET ORAL DAILY
Qty: 30 TABLET | Refills: 0 | Status: SHIPPED | OUTPATIENT
Start: 2022-11-21 | End: 2023-01-31 | Stop reason: SDUPTHER

## 2022-11-21 RX ORDER — ATORVASTATIN CALCIUM 80 MG/1
TABLET, FILM COATED ORAL
Qty: 30 TABLET | Refills: 0 | Status: SHIPPED | OUTPATIENT
Start: 2022-11-21 | End: 2023-01-31 | Stop reason: SDUPTHER

## 2023-01-31 ENCOUNTER — OFFICE VISIT (OUTPATIENT)
Dept: FAMILY MEDICINE CLINIC | Facility: CLINIC | Age: 58
End: 2023-01-31
Payer: MEDICAID

## 2023-01-31 VITALS
WEIGHT: 169 LBS | HEART RATE: 72 BPM | SYSTOLIC BLOOD PRESSURE: 138 MMHG | TEMPERATURE: 98 F | BODY MASS INDEX: 31.1 KG/M2 | DIASTOLIC BLOOD PRESSURE: 80 MMHG | OXYGEN SATURATION: 98 % | HEIGHT: 62 IN

## 2023-01-31 DIAGNOSIS — R01.1 CARDIAC MURMUR: ICD-10-CM

## 2023-01-31 DIAGNOSIS — I10 ESSENTIAL HYPERTENSION: ICD-10-CM

## 2023-01-31 DIAGNOSIS — E78.2 MIXED HYPERLIPIDEMIA: ICD-10-CM

## 2023-01-31 DIAGNOSIS — Z11.59 ENCOUNTER FOR HEPATITIS C SCREENING TEST FOR LOW RISK PATIENT: ICD-10-CM

## 2023-01-31 DIAGNOSIS — E11.65 UNCONTROLLED TYPE 2 DIABETES MELLITUS WITH HYPERGLYCEMIA: Primary | ICD-10-CM

## 2023-01-31 DIAGNOSIS — I63.9 ACUTE STROKE DUE TO ISCHEMIA: ICD-10-CM

## 2023-01-31 DIAGNOSIS — Z12.31 BREAST CANCER SCREENING BY MAMMOGRAM: ICD-10-CM

## 2023-01-31 LAB
EXPIRATION DATE: NORMAL
GLUCOSE BLDC GLUCOMTR-MCNC: 173 MG/DL (ref 70–130)
HBA1C MFR BLD: 7.7 %
Lab: NORMAL

## 2023-01-31 PROCEDURE — 99214 OFFICE O/P EST MOD 30 MIN: CPT | Performed by: PHYSICIAN ASSISTANT

## 2023-01-31 PROCEDURE — 82962 GLUCOSE BLOOD TEST: CPT | Performed by: PHYSICIAN ASSISTANT

## 2023-01-31 PROCEDURE — 83036 HEMOGLOBIN GLYCOSYLATED A1C: CPT | Performed by: PHYSICIAN ASSISTANT

## 2023-01-31 PROCEDURE — 3051F HG A1C>EQUAL 7.0%<8.0%: CPT | Performed by: PHYSICIAN ASSISTANT

## 2023-01-31 RX ORDER — AMLODIPINE BESYLATE 10 MG/1
10 TABLET ORAL DAILY
Qty: 90 TABLET | Refills: 1 | Status: SHIPPED | OUTPATIENT
Start: 2023-01-31

## 2023-01-31 RX ORDER — LANCETS 28 GAUGE
EACH MISCELLANEOUS
Qty: 100 EACH | Refills: 5 | Status: SHIPPED | OUTPATIENT
Start: 2023-01-31

## 2023-01-31 RX ORDER — ASPIRIN 81 MG/1
81 TABLET, CHEWABLE ORAL DAILY
Qty: 90 TABLET | Refills: 3 | Status: SHIPPED | OUTPATIENT
Start: 2023-01-31

## 2023-01-31 RX ORDER — METFORMIN HYDROCHLORIDE 500 MG/1
500 TABLET, EXTENDED RELEASE ORAL
Qty: 180 TABLET | Refills: 1 | Status: SHIPPED | OUTPATIENT
Start: 2023-01-31

## 2023-01-31 RX ORDER — ATORVASTATIN CALCIUM 80 MG/1
80 TABLET, FILM COATED ORAL DAILY
Qty: 90 TABLET | Refills: 3 | Status: SHIPPED | OUTPATIENT
Start: 2023-01-31

## 2023-01-31 RX ORDER — METRONIDAZOLE 500 MG/1
500 TABLET ORAL 2 TIMES DAILY
Qty: 14 TABLET | Refills: 0 | Status: SHIPPED | OUTPATIENT
Start: 2023-01-31

## 2023-01-31 RX ORDER — LOSARTAN POTASSIUM 25 MG/1
25 TABLET ORAL DAILY
Qty: 90 TABLET | Refills: 1 | Status: SHIPPED | OUTPATIENT
Start: 2023-01-31

## 2023-02-20 ENCOUNTER — TELEPHONE (OUTPATIENT)
Dept: FAMILY MEDICINE CLINIC | Facility: CLINIC | Age: 58
End: 2023-02-20
Payer: MEDICAID

## 2023-02-20 NOTE — TELEPHONE ENCOUNTER
I am not able to send in prescription for these antibiotics without an exam and an appointment.  Recommend making an in person appointment

## 2023-02-20 NOTE — TELEPHONE ENCOUNTER
Caller: Shital Guzmán    Relationship: Self    Best call back number: [phone]    What medication are you requesting: FLAGYL AND Z PACK    What are your current symptoms: YEAST AND COLD SYMPTOMS     How long have you been experiencing symptoms: DAYS     Have you had these symptoms before:    [x] Yes  [] No    Have you been treated for these symptoms before:   [x] Yes  [] No    If a prescription is needed, what is your preferred pharmacy and phone number: [facility] [address] [place] [address] [phone] [facility] [phone]      Additional notes: PLEASE ADVISE

## 2023-02-21 ENCOUNTER — TELEPHONE (OUTPATIENT)
Dept: FAMILY MEDICINE CLINIC | Facility: CLINIC | Age: 58
End: 2023-02-21
Payer: MEDICAID

## 2023-02-21 RX ORDER — METRONIDAZOLE 500 MG/1
500 TABLET ORAL 2 TIMES DAILY
Qty: 14 TABLET | Refills: 0 | OUTPATIENT
Start: 2023-02-21

## 2023-02-21 NOTE — TELEPHONE ENCOUNTER
Caller: Guzmán Shital    Relationship: Self    Best call back number: [phone]    What medication are you requesting: ZPAC AND MEDICATION FROM LAST VISIT  MEDICATION: VAGIL    What are your current symptoms: HEADACHE, COUGH, NOSE CONGESTION    Have you had these symptoms before:    [] Yes  [x] No    Have you been treated for these symptoms before:   [] Yes  [x] No    If a prescription is needed, what is your preferred pharmacy and phone number: [address] [phone] [facility] [phone]      Additional notes: PATIENT DID NOT WANT TO MAKE AN EARLIER APPOINTMENT THAN 03/08/23, PATIENT STATE SHE WAS SEEN LAST WEEK. PATIENT STATES SHE WANTS ZPAC AND THAT YOU FORGOT TO CALL IN THE MEDICATION.

## 2023-02-21 NOTE — TELEPHONE ENCOUNTER
Caller: Shital Guzmán    Relationship: Self    Best call back number: [phone]    Requested Prescriptions:   Requested Prescriptions     Pending Prescriptions Disp Refills   • metroNIDAZOLE (Flagyl) 500 MG tablet 14 tablet 0     Sig: Take 1 tablet by mouth 2 (Two) Times a Day.        Pharmacy where request should be sent: [facility] [address] [place] [address] [phone] [facility] [phone] FX     Additional details provided by patient: PATIENT NEEDS REFILL    Does the patient have less than a 3 day supply:  [] Yes  [] No    Would you like a call back once the refill request has been completed: [] Yes [] No    If the office needs to give you a call back, can they leave a voicemail: [] Yes [] No    Juan J Hook Rep   02/21/23 10:52 EST

## 2023-05-16 ENCOUNTER — OFFICE VISIT (OUTPATIENT)
Dept: INTERNAL MEDICINE | Facility: CLINIC | Age: 58
End: 2023-05-16
Payer: MEDICAID

## 2023-05-16 VITALS
OXYGEN SATURATION: 98 % | HEIGHT: 62 IN | HEART RATE: 88 BPM | BODY MASS INDEX: 29.44 KG/M2 | WEIGHT: 160 LBS | SYSTOLIC BLOOD PRESSURE: 147 MMHG | RESPIRATION RATE: 16 BRPM | DIASTOLIC BLOOD PRESSURE: 103 MMHG | TEMPERATURE: 97 F

## 2023-05-16 DIAGNOSIS — I10 ACCELERATED ESSENTIAL HYPERTENSION: Primary | ICD-10-CM

## 2023-05-16 DIAGNOSIS — Z79.4 TYPE 2 DIABETES MELLITUS WITH HYPERGLYCEMIA, WITH LONG-TERM CURRENT USE OF INSULIN: ICD-10-CM

## 2023-05-16 DIAGNOSIS — E11.65 TYPE 2 DIABETES MELLITUS WITH HYPERGLYCEMIA, WITH LONG-TERM CURRENT USE OF INSULIN: ICD-10-CM

## 2023-05-16 DIAGNOSIS — Z01.419 ROUTINE GYNECOLOGICAL EXAMINATION: ICD-10-CM

## 2023-05-16 DIAGNOSIS — E78.2 MIXED HYPERLIPIDEMIA: ICD-10-CM

## 2023-05-16 DIAGNOSIS — I69.90 LATE EFFECTS OF CVA (CEREBROVASCULAR ACCIDENT): ICD-10-CM

## 2023-05-16 RX ORDER — ATORVASTATIN CALCIUM 80 MG/1
80 TABLET, FILM COATED ORAL NIGHTLY
Qty: 30 TABLET | Refills: 3 | Status: SHIPPED | OUTPATIENT
Start: 2023-05-16

## 2023-05-16 RX ORDER — AMLODIPINE BESYLATE 10 MG/1
10 TABLET ORAL NIGHTLY
Qty: 30 TABLET | Refills: 3 | Status: SHIPPED | OUTPATIENT
Start: 2023-05-16

## 2023-05-16 RX ORDER — LOSARTAN POTASSIUM 50 MG/1
50 TABLET ORAL DAILY
Qty: 30 TABLET | Refills: 3 | Status: SHIPPED | OUTPATIENT
Start: 2023-05-16

## 2023-05-16 NOTE — PROGRESS NOTES
Subjective   Shital Guzmán is a 58 y.o. female.     Chief Complaint   Patient presents with   • Establish Care   • Hypertension   • Hyperlipidemia   • Diabetes       History of Present Illness   HPI: Patient is here for an initial visit, patient is here to follow up on the blood pressure which is noted to be elevated today, the patient is taking the blood pressure medications as prescribed and has had no side effects. The patient is also here to follow up on the cholesterol and is trying to follow a diet. The patient is also here to follow on sugar  and is  due to get lab work done . The patient also needs refills on medications .  She states she had a stroke and follows up with neurology, she needs an appointment to see GYN  Hypertension   Pertinent negatives include no chest pain, palpitations or shortness of breath.   Hyperlipidemia   Pertinent negatives include no chest pain or shortness of breath.   Diabetes   Pertinent negatives for hypoglycemia include no dizziness, nervousness/anxiousness or pallor. Pertinent negatives for diabetes include no chest pain, no shortness of breath  Patient is on acei/arb     Review of Systems   All other systems reviewed and are negative.      Past Medical History:   Diagnosis Date   • Anemia    • Colon polyp 2014   • Essential hypertension 2011   • GERD (gastroesophageal reflux disease)    • H/O gonorrhea 10/2016   • High cholesterol 2015   • Intramural and subserous leiomyoma of uterus 2018   • Uterine fibroid 2008       Past Surgical History:   Procedure Laterality Date   • COLONOSCOPY      2022 [facility] clinic   • D & C HYSTEROSCOPY  09/18/2018    path was benign   • LAPAROSCOPIC APPENDECTOMY  2018   • LAPAROSCOPIC TUBAL LIGATION  1988       Family History   Problem Relation Age of Onset   • Cancer Mother    • Ovarian cancer Mother         reports that she has never smoked. She has never used smokeless tobacco. She reports current alcohol use. She reports current drug use. Drug:  "\"Crack\" cocaine.    Allergies   Allergen Reactions   • Carvedilol Hives   • Hydrochlorothiazide Hives   • Losartan Potassium-Hctz Hives           Current Outpatient Medications:   •  amLODIPine (NORVASC) 10 MG tablet, Take 1 tablet by mouth Every Night., Disp: 30 tablet, Rfl: 3  •  aspirin ([facility] Aspirin Adult Low Dose) 81 MG chewable tablet, Chew 1 tablet Daily., Disp: 90 tablet, Rfl: 3  •  atorvastatin (LIPITOR) 80 MG tablet, Take 1 tablet by mouth Every Night., Disp: 30 tablet, Rfl: 3  •  loratadine (CLARITIN) 10 MG tablet, Take 1 tablet by mouth Daily., Disp: 30 tablet, Rfl: 5  •  losartan (COZAAR) 50 MG tablet, Take 1 tablet by mouth Daily., Disp: 30 tablet, Rfl: 3  •  glucose blood test strip, Check sugar once a day, Disp: 30 each, Rfl: 12  •  glucose monitor monitoring kit, 1 each Daily., Disp: 1 each, Rfl: 1  •  Insulin Glargine (LANTUS SOLOSTAR) 100 UNIT/ML injection pen, Inject 10 Units under the skin into the appropriate area as directed Every Night for 30 days. For diabetes, Disp: 15 mL, Rfl: 0  •  Lancets 30G misc, Check sugar daily, Disp: 30 each, Rfl: 12  •  metFORMIN (Glucophage) 500 MG tablet, Take 1 tablet by mouth Daily., Disp: 30 tablet, Rfl: 0      Objective   Blood pressure (!) 147/103, pulse 88, temperature 97 °F (36.1 °C), resp. rate 16, height 157.5 cm (62.01\"), weight 72.6 kg (160 lb), SpO2 98 %, not currently breastfeeding.    Physical Exam  Vitals and nursing note reviewed.   Constitutional:       General: She is not in acute distress.     Appearance: Normal appearance. She is not diaphoretic.   HENT:      Head: Normocephalic and atraumatic.      Right Ear: External ear normal.      Left Ear: External ear normal.      Nose: Nose normal.   Eyes:      Extraocular Movements: Extraocular movements intact.      Conjunctiva/sclera: Conjunctivae normal.   Neck:      Trachea: Trachea normal.   Cardiovascular:      Rate and Rhythm: Normal rate and regular rhythm.      Heart sounds: Normal heart " sounds.   Pulmonary:      Effort: Pulmonary effort is normal. No respiratory distress.      Breath sounds: Normal breath sounds.   Abdominal:      General: Abdomen is flat.   Musculoskeletal:      Cervical back: Neck supple.      Comments: Moves all limbs   Skin:     General: Skin is warm.   Neurological:      Mental Status: She is alert and oriented to person, place, and time.      Comments: Mild left side weakness         BMI is >= 25 and <30. (Overweight) The following options were offered after discussion;: weight loss educational material (shared in after visit summary), exercise counseling/recommendations and nutrition counseling/recommendations      Results for orders placed or performed in visit on 05/16/23   Comprehensive Metabolic Panel    Specimen: Blood   Result Value Ref Range    Glucose 174 (H) 65 - 99 mg/dL    BUN 14 6 - 20 mg/dL    Creatinine 0.89 0.57 - 1.00 mg/dL    EGFR Result 75.3 >60.0 mL/min/1.73    BUN/Creatinine Ratio 15.7 7.0 - 25.0    Sodium 146 (H) 136 - 145 mmol/L    Potassium 3.7 3.5 - 5.2 mmol/L    Chloride 102 98 - 107 mmol/L    Total CO2 29.2 (H) 22.0 - 29.0 mmol/L    Calcium 10.3 8.6 - 10.5 mg/dL    Total Protein 7.6 6.0 - 8.5 g/dL    Albumin 4.7 3.5 - 5.2 g/dL    Globulin 2.9 gm/dL    A/G Ratio 1.6 g/dL    Total Bilirubin 0.8 0.0 - 1.2 mg/dL    Alkaline Phosphatase 90 39 - 117 U/L    AST (SGOT) 16 1 - 32 U/L    ALT (SGPT) 14 1 - 33 U/L   Lipid Panel    Specimen: Blood   Result Value Ref Range    Total Cholesterol 231 (H) 0 - 200 mg/dL    Triglycerides 244 (H) 0 - 150 mg/dL    HDL Cholesterol 46 40 - 60 mg/dL    VLDL Cholesterol Moi 44 (H) 5 - 40 mg/dL    LDL Chol Calc (NIH) 141 (H) 0 - 100 mg/dL   Hemoglobin A1c    Specimen: Blood   Result Value Ref Range    Hemoglobin A1C 8.40 (H) 4.80 - 5.60 %   Microalbumin / Creatinine Urine Ratio - Urine, Clean Catch    Specimen: Urine, Clean Catch   Result Value Ref Range    Creatinine, Urine 292.4 Not Estab. mg/dL    Microalbumin, Urine 91.3  Not Estab. ug/mL    Microalbumin/Creatinine Ratio 31 (H) 0 - 29 mg/g creat   CBC & Differential    Specimen: Blood   Result Value Ref Range    WBC 10.81 (H) 3.40 - 10.80 10*3/mm3    RBC 5.39 (H) 3.77 - 5.28 10*6/mm3    Hemoglobin 16.2 (H) 12.0 - 15.9 g/dL    Hematocrit 47.7 (H) 34.0 - 46.6 %    MCV 88.5 79.0 - 97.0 fL    MCH 30.1 26.6 - 33.0 pg    MCHC 34.0 31.5 - 35.7 g/dL    RDW 12.1 (L) 12.3 - 15.4 %    Platelets 330 140 - 450 10*3/mm3    Neutrophil Rel % 48.8 42.7 - 76.0 %    Lymphocyte Rel % 42.0 19.6 - 45.3 %    Monocyte Rel % 5.6 5.0 - 12.0 %    Eosinophil Rel % 2.8 0.3 - 6.2 %    Basophil Rel % 0.5 0.0 - 1.5 %    Neutrophils Absolute 5.28 1.70 - 7.00 10*3/mm3    Lymphocytes Absolute 4.54 (H) 0.70 - 3.10 10*3/mm3    Monocytes Absolute 0.61 0.10 - 0.90 10*3/mm3    Eosinophils Absolute 0.30 0.00 - 0.40 10*3/mm3    Basophils Absolute 0.05 0.00 - 0.20 10*3/mm3    Immature Granulocyte Rel % 0.3 0.0 - 0.5 %    Immature Grans Absolute 0.03 0.00 - 0.05 10*3/mm3    nRBC 0.0 0.0 - 0.2 /100 WBC         Assessment & Plan   Diagnoses and all orders for this visit:    1. Accelerated essential hypertension (Primary)  -     losartan (COZAAR) 50 MG tablet; Take 1 tablet by mouth Daily.  Dispense: 30 tablet; Refill: 3  -     amLODIPine (NORVASC) 10 MG tablet; Take 1 tablet by mouth Every Night.  Dispense: 30 tablet; Refill: 3    2. Mixed hyperlipidemia  -     atorvastatin (LIPITOR) 80 MG tablet; Take 1 tablet by mouth Every Night.  Dispense: 30 tablet; Refill: 3  -     CBC & Differential  -     Comprehensive Metabolic Panel  -     Lipid Panel    3. Type 2 diabetes mellitus with hyperglycemia, with long-term current use of insulin  -     Ambulatory Referral to Endocrinology  -     Ambulatory Referral for Diabetic Eye Exam-Ophthalmology  -     Hemoglobin A1c  -     Microalbumin / Creatinine Urine Ratio - Urine, Clean Catch  -     glucose monitor monitoring kit; 1 each Daily.  Dispense: 1 each; Refill: 1  -     glucose blood test  strip; Check sugar once a day  Dispense: 30 each; Refill: 12  -     Lancets 30G misc; Check sugar daily  Dispense: 30 each; Refill: 12    4. Late effects of CVA (cerebrovascular accident)    5. Routine gynecological examination  -     Ambulatory Referral to Gynecology      Plan:  1.  Accelerated  essential hypertension: Will continue current medication and increase to losartan 50 mg po qd , low-sodium diet advised, Counseled to regularly check BP at home with goal averaging <130/80.  Has been referred to cardiology  2.mixed hyperlipidemia: will obtain   fasting CMP and lipid panel.  Diet and exercise counseled,  Will continue current medications  3. Diabetes  Mellitus  : will obtain   fasting CMP  and hba1c  , diet and exercise counseled , Will continue current medications, refer to endocrinology   4.  Late effects of cva : per neurology , on asa and lipitor ,   5. Routine gyn exam : refer to gyn      I spent 45  minutes caring for Shital    on this date of service. This time includes time spent by me in the following activities:preparing for the visit, reviewing tests, performing a medically appropriate examination and/or evaluation , counseling and educating the patient/family/caregiver, ordering medications, tests, or procedures and documenting information in the medical record           Halle Judd MD

## 2023-05-17 ENCOUNTER — TELEPHONE (OUTPATIENT)
Dept: INTERNAL MEDICINE | Facility: CLINIC | Age: 58
End: 2023-05-17
Payer: MEDICAID

## 2023-05-17 RX ORDER — METFORMIN HYDROCHLORIDE 500 MG/1
500 TABLET, EXTENDED RELEASE ORAL
Qty: 180 TABLET | Refills: 1 | Status: CANCELLED | OUTPATIENT
Start: 2023-05-17

## 2023-05-17 RX ORDER — BLOOD-GLUCOSE METER
1 KIT MISCELLANEOUS DAILY
Qty: 1 EACH | Refills: 1 | Status: SHIPPED | OUTPATIENT
Start: 2023-05-17

## 2023-05-17 NOTE — TELEPHONE ENCOUNTER
Rx Refill Note  Requested Prescriptions     Pending Prescriptions Disp Refills   • metFORMIN ER (GLUCOPHAGE-XR) 500 MG 24 hr tablet 180 tablet 1     Sig: Take 1 tablet by mouth Daily With Dinner.      Last office visit with prescribing clinician: 5/16/2023   Last telemedicine visit with prescribing clinician: 5/16/2023   Next office visit with prescribing clinician: 6/7/2023     She usually get this from Ela Brown

## 2023-05-17 NOTE — TELEPHONE ENCOUNTER
Caller: Shital Guzmán    Relationship: Self    Best call back number: [phone]    Requested Prescriptions:   Requested Prescriptions      No prescriptions requested or ordered in this encounter        Pharmacy where request should be sent:  [facility] [address] [place] [address] - [phone] [facility] [phone] FX  P: [phone]  F: [phone]      Last office visit with prescribing clinician: 5/16/2023   Last telemedicine visit with prescribing clinician: 5/17/2023   Next office visit with prescribing clinician: 6/7/2023     Additional details provided by patient: PATIENT IS REQUESTING THE ONE TOUCH GLUCOSE METER AND TEST STRIPS    Does the patient have less than a 3 day supply:  [x] Yes  [] No      Juan J Selby Rep   05/17/23 13:20 EDT

## 2023-05-17 NOTE — TELEPHONE ENCOUNTER
Caller: Shital Guzmán    Relationship: Self    Best call back number: [phone]    Requested Prescriptions:   Requested Prescriptions     Pending Prescriptions Disp Refills   • metFORMIN ER (GLUCOPHAGE-XR) 500 MG 24 hr tablet 180 tablet 1     Sig: Take 1 tablet by mouth Daily With Dinner.        Pharmacy where request should be sent: [facility] [address] [place] [address] [phone] [facility] [phone]      Last office visit with prescribing clinician: 5/16/2023   Last telemedicine visit with prescribing clinician: 5/16/2023   Next office visit with prescribing clinician: 6/7/2023     Additional details provided by patient:     Does the patient have less than a 3 day supply:  [] Yes  [x] No    Would you like a call back once the refill request has been completed: [] Yes [x] No    If the office needs to give you a call back, can they leave a voicemail: [] Yes [x] No    Juan J Arriaga Rep   05/17/23 12:53 EDT

## 2023-05-17 NOTE — TELEPHONE ENCOUNTER
Rx Refill Note  Requested Prescriptions     Pending Prescriptions Disp Refills   • metFORMIN ER (GLUCOPHAGE-XR) 500 MG 24 hr tablet 180 tablet 1     Sig: Take 1 tablet by mouth Daily With Dinner.      Last office visit with prescribing clinician: 5/16/2023   Last telemedicine visit with prescribing clinician: 5/16/2023   Next office visit with prescribing clinician: 5/17/2023                         Would you like a call back once the refill request has been completed: [] Yes [] No    If the office needs to give you a call back, can they leave a voicemail: [] Yes [] No    Abbe Traore CMA  05/17/23, 14:01 EDT

## 2023-05-18 ENCOUNTER — TELEPHONE (OUTPATIENT)
Dept: INTERNAL MEDICINE | Facility: CLINIC | Age: 58
End: 2023-05-18
Payer: MEDICAID

## 2023-05-18 ENCOUNTER — TELEPHONE (OUTPATIENT)
Dept: INTERNAL MEDICINE | Facility: CLINIC | Age: 58
End: 2023-05-18

## 2023-05-18 RX ORDER — METFORMIN HYDROCHLORIDE 500 MG/1
500 TABLET, EXTENDED RELEASE ORAL
Qty: 180 TABLET | Refills: 1 | Status: CANCELLED | OUTPATIENT
Start: 2023-05-18

## 2023-05-18 NOTE — TELEPHONE ENCOUNTER
A user error has taken place: encounter opened in error, closed for administrative reasons.    PATIENT HUNG UP BEFORE I COULD FINISH THE ENCOUNTER.

## 2023-05-18 NOTE — TELEPHONE ENCOUNTER
Caller: Shital Guzmán    Relationship: Self    Best call back number: [phone]    Requested Prescriptions:   Requested Prescriptions      No prescriptions requested or ordered in this encounter        metFORMIN ER (GLUCOPHAGE-XR) 500 MG 24 hr tablet    Insulin Glargine (LANTUS SOLOSTAR) 100 UNIT/ML injection pen ()    Pharmacy where request should be sent:      [facility] [address] [place] [address] [phone] [facility] [phone]      Last office visit with prescribing clinician: 2023   Last telemedicine visit with prescribing clinician: 2023   Next office visit with prescribing clinician: 2023     Additional details provided by patient:     COMPLETELY OUT OF MEDICATION    Does the patient have less than a 3 day supply:    [x] Yes  [] No    Would you like a call back once the refill request has been completed: [] Yes [x] No    If the office needs to give you a call back, can they leave a voicemail: [] Yes [x] No    Elma Sommer, PCT   23 13:09 EDT

## 2023-05-18 NOTE — TELEPHONE ENCOUNTER
Rx Refill Note  Requested Prescriptions     Pending Prescriptions Disp Refills   • metFORMIN ER (GLUCOPHAGE-XR) 500 MG 24 hr tablet 180 tablet 1     Sig: Take 1 tablet by mouth Daily With Dinner.   • Insulin Glargine (LANTUS SOLOSTAR) 100 UNIT/ML injection pen 45 mL 5     Sig: Inject 10 Units under the skin into the appropriate area as directed Every Night for 30 days. For diabetes      Last office visit with prescribing clinician: 5/16/2023   Last telemedicine visit with prescribing clinician: 5/18/2023   Next office visit with prescribing clinician: 6/7/2023                         Would you like a call back once the refill request has been completed: [] Yes [] No    If the office needs to give you a call back, can they leave a voicemail: [] Yes [] No    Lissette Johnson LPN  05/18/23, 13:18 EDT

## 2023-05-19 LAB
ALBUMIN SERPL-MCNC: 4.7 G/DL (ref 3.5–5.2)
ALBUMIN/CREAT UR: 31 MG/G CREAT (ref 0–29)
ALBUMIN/GLOB SERPL: 1.6 G/DL
ALP SERPL-CCNC: 90 U/L (ref 39–117)
ALT SERPL-CCNC: 14 U/L (ref 1–33)
AST SERPL-CCNC: 16 U/L (ref 1–32)
BASOPHILS # BLD AUTO: 0.05 10*3/MM3 (ref 0–0.2)
BASOPHILS NFR BLD AUTO: 0.5 % (ref 0–1.5)
BILIRUB SERPL-MCNC: 0.8 MG/DL (ref 0–1.2)
BUN SERPL-MCNC: 14 MG/DL (ref 6–20)
BUN/CREAT SERPL: 15.7 (ref 7–25)
CALCIUM SERPL-MCNC: 10.3 MG/DL (ref 8.6–10.5)
CHLORIDE SERPL-SCNC: 102 MMOL/L (ref 98–107)
CHOLEST SERPL-MCNC: 231 MG/DL (ref 0–200)
CO2 SERPL-SCNC: 29.2 MMOL/L (ref 22–29)
CREAT SERPL-MCNC: 0.89 MG/DL (ref 0.57–1)
CREAT UR-MCNC: 292.4 MG/DL
EGFRCR SERPLBLD CKD-EPI 2021: 75.3 ML/MIN/1.73
EOSINOPHIL # BLD AUTO: 0.3 10*3/MM3 (ref 0–0.4)
EOSINOPHIL NFR BLD AUTO: 2.8 % (ref 0.3–6.2)
ERYTHROCYTE [DISTWIDTH] IN BLOOD BY AUTOMATED COUNT: 12.1 % (ref 12.3–15.4)
GLOBULIN SER CALC-MCNC: 2.9 GM/DL
GLUCOSE SERPL-MCNC: 174 MG/DL (ref 65–99)
HBA1C MFR BLD: 8.4 % (ref 4.8–5.6)
HCT VFR BLD AUTO: 47.7 % (ref 34–46.6)
HDLC SERPL-MCNC: 46 MG/DL (ref 40–60)
HGB BLD-MCNC: 16.2 G/DL (ref 12–15.9)
IMM GRANULOCYTES # BLD AUTO: 0.03 10*3/MM3 (ref 0–0.05)
IMM GRANULOCYTES NFR BLD AUTO: 0.3 % (ref 0–0.5)
LDLC SERPL CALC-MCNC: 141 MG/DL (ref 0–100)
LYMPHOCYTES # BLD AUTO: 4.54 10*3/MM3 (ref 0.7–3.1)
LYMPHOCYTES NFR BLD AUTO: 42 % (ref 19.6–45.3)
MCH RBC QN AUTO: 30.1 PG (ref 26.6–33)
MCHC RBC AUTO-ENTMCNC: 34 G/DL (ref 31.5–35.7)
MCV RBC AUTO: 88.5 FL (ref 79–97)
MICROALBUMIN UR-MCNC: 91.3 UG/ML
MONOCYTES # BLD AUTO: 0.61 10*3/MM3 (ref 0.1–0.9)
MONOCYTES NFR BLD AUTO: 5.6 % (ref 5–12)
NEUTROPHILS # BLD AUTO: 5.28 10*3/MM3 (ref 1.7–7)
NEUTROPHILS NFR BLD AUTO: 48.8 % (ref 42.7–76)
NRBC BLD AUTO-RTO: 0 /100 WBC (ref 0–0.2)
PLATELET # BLD AUTO: 330 10*3/MM3 (ref 140–450)
POTASSIUM SERPL-SCNC: 3.7 MMOL/L (ref 3.5–5.2)
PROT SERPL-MCNC: 7.6 G/DL (ref 6–8.5)
RBC # BLD AUTO: 5.39 10*6/MM3 (ref 3.77–5.28)
SODIUM SERPL-SCNC: 146 MMOL/L (ref 136–145)
TRIGL SERPL-MCNC: 244 MG/DL (ref 0–150)
VLDLC SERPL CALC-MCNC: 44 MG/DL (ref 5–40)
WBC # BLD AUTO: 10.81 10*3/MM3 (ref 3.4–10.8)

## 2023-05-22 ENCOUNTER — TELEPHONE (OUTPATIENT)
Dept: INTERNAL MEDICINE | Facility: CLINIC | Age: 58
End: 2023-05-22
Payer: MEDICAID

## 2023-05-22 NOTE — TELEPHONE ENCOUNTER
She was already taking Lantus and I just refilled it, this is a medication that she has been taking in the past  Due to elevated A1c I change the extended release into immediate release  She has been referred to endocrinology for further adjustment in her medications

## 2023-05-22 NOTE — TELEPHONE ENCOUNTER
Caller: Shital Guzmán    Relationship: Self    Best call back number: [phone]    What medications are you currently taking:   Current Outpatient Medications on File Prior to Visit   Medication Sig Dispense Refill   • amLODIPine (NORVASC) 10 MG tablet Take 1 tablet by mouth Every Night. 30 tablet 3   • aspirin ([facility] Aspirin Adult Low Dose) 81 MG chewable tablet Chew 1 tablet Daily. 90 tablet 3   • atorvastatin (LIPITOR) 80 MG tablet Take 1 tablet by mouth Every Night. 30 tablet 3   • glucose blood test strip Check sugar once a day 30 each 12   • glucose monitor monitoring kit 1 each Daily. 1 each 1   • Insulin Glargine (LANTUS SOLOSTAR) 100 UNIT/ML injection pen Inject 10 Units under the skin into the appropriate area as directed Every Night for 30 days. For diabetes 15 mL 0   • Lancets 30G misc Check sugar daily 30 each 12   • loratadine (CLARITIN) 10 MG tablet Take 1 tablet by mouth Daily. 30 tablet 5   • losartan (COZAAR) 50 MG tablet Take 1 tablet by mouth Daily. 30 tablet 3   • metFORMIN (Glucophage) 500 MG tablet Take 1 tablet by mouth Daily. 30 tablet 0     No current facility-administered medications on file prior to visit.          When did you start taking these medications: 05/18/2023    Which medication are you concerned about:   Insulin Glargine (LANTUS SOLOSTAR) 100 UNIT/ML injection pen    Who prescribed you this medication:   DELMER LOREDO MD     What are your concerns:   PATIENT STATED THAT THIS MEDICATION IS NOT HELPING WITH HER BLOOD SUGAR AND CAUSING HER TO FEEL NAUSEA AND FATIGUE AND WOULD LIKE FOR THIS MEDICATION TO BE CHANGED     PATIENT STATED THAT SHE WOULD LIKE A CALL BACK REGARDING   metFORMIN (Glucophage) 500 MG tablet  BEING CALLED INTO THE PHARMACY AS NOT THE EXTEND RELEASE WHICH PATIENT STATED THAT ONLY THE EXTENDED RELEASE HELPS HER

## 2023-05-23 ENCOUNTER — TELEPHONE (OUTPATIENT)
Dept: INTERNAL MEDICINE | Facility: CLINIC | Age: 58
End: 2023-05-23
Payer: MEDICAID

## 2023-05-23 ENCOUNTER — TELEPHONE (OUTPATIENT)
Dept: INTERNAL MEDICINE | Facility: CLINIC | Age: 58
End: 2023-05-23

## 2023-05-23 NOTE — TELEPHONE ENCOUNTER
Caller: Shital Guzmán    Relationship: Self    Best call back number: [phone]    What is the medical concern/diagnosis: GLAUCOMA     What specialty or service is being requested:  EYE DOCTOR     What is the office location:  [facility]

## 2023-05-23 NOTE — TELEPHONE ENCOUNTER
Caller: Shital Guzmán    Relationship: Self    Best call back number: [phone]    What form or medical record are you requesting:  THE PATIENT WILL DROP OFF PAPERWORK ON  5-24-23 FOR EQUINE THERAPY

## 2023-06-12 ENCOUNTER — TELEPHONE (OUTPATIENT)
Dept: INTERNAL MEDICINE | Facility: CLINIC | Age: 58
End: 2023-06-12
Payer: MEDICAID

## 2023-06-12 NOTE — TELEPHONE ENCOUNTER
Pt called regarding her equine therapy paperwork. She has an appt on 6.13.2023 and would like to pick it up then.

## 2023-06-28 ENCOUNTER — TELEPHONE (OUTPATIENT)
Dept: INTERNAL MEDICINE | Facility: CLINIC | Age: 58
End: 2023-06-28

## 2023-06-28 NOTE — TELEPHONE ENCOUNTER
Caller: Shital Guzmán    Relationship: Self    Best call back number: [phone]     What medication are you requesting: IBUPROFEN 800    What are your current symptoms: LEG PAIN    How long have you been experiencing symptoms: ONGOING    Have you had these symptoms before:    [x] Yes  [] No    Have you been treated for these symptoms before:   [] Yes  [] No    If a prescription is needed, what is your preferred pharmacy and phone number: [facility] [address] [place] [address] [phone] [facility] [phone] FX     Additional notes: PLEASE ADVISE

## 2023-07-03 PROBLEM — I35.0 AORTIC STENOSIS, MILD: Status: ACTIVE | Noted: 2023-07-03

## 2023-07-05 ENCOUNTER — TELEPHONE (OUTPATIENT)
Dept: INTERNAL MEDICINE | Facility: CLINIC | Age: 58
End: 2023-07-05

## 2023-07-05 NOTE — TELEPHONE ENCOUNTER
Pt called regarding test strips. She states she checks her sugar 2x in the AM and 2x in the PM. She needs an rx sent to [facility] that reflects this since she is out of strips.

## 2023-07-06 ENCOUNTER — TELEPHONE (OUTPATIENT)
Dept: INTERNAL MEDICINE | Facility: CLINIC | Age: 58
End: 2023-07-06

## 2023-07-06 NOTE — TELEPHONE ENCOUNTER
Caller: Shital Guzmán    Relationship: Self    Best call back number: [phone]     What form or medical record are you requesting: LETTER STATING SHE IS UNABLE WORK DUE TO HEALTH ISSUESFOR SNAP BENEFITS AND MEDICAID    Who is requesting this form or medical record from you: MEDICAID    How would you like to receive the form or medical records (pick-up, mail, fax): FAX  If fax, what is the fax number: [phone]  Timeframe paperwork needed: ASAP    Additional notes: CALL WHEN SENT

## 2023-08-30 ENCOUNTER — OFFICE VISIT (OUTPATIENT)
Dept: ENDOCRINOLOGY | Facility: CLINIC | Age: 58
End: 2023-08-30
Payer: MEDICAID

## 2023-08-30 ENCOUNTER — TELEPHONE (OUTPATIENT)
Dept: ENDOCRINOLOGY | Facility: CLINIC | Age: 58
End: 2023-08-30

## 2023-08-30 VITALS
HEIGHT: 62 IN | HEART RATE: 82 BPM | OXYGEN SATURATION: 98 % | DIASTOLIC BLOOD PRESSURE: 76 MMHG | SYSTOLIC BLOOD PRESSURE: 134 MMHG | WEIGHT: 162 LBS | BODY MASS INDEX: 29.81 KG/M2

## 2023-08-30 DIAGNOSIS — E11.65 TYPE 2 DIABETES MELLITUS WITH HYPERGLYCEMIA, WITHOUT LONG-TERM CURRENT USE OF INSULIN: Primary | Chronic | ICD-10-CM

## 2023-08-30 LAB
EXPIRATION DATE: ABNORMAL
EXPIRATION DATE: NORMAL
GLUCOSE BLDC GLUCOMTR-MCNC: 161 MG/DL (ref 70–130)
HBA1C MFR BLD: 7.5 %
Lab: ABNORMAL
Lab: NORMAL

## 2023-08-30 RX ORDER — ACYCLOVIR 400 MG/1
1 TABLET ORAL
Qty: 9 EACH | Refills: 3 | Status: SHIPPED | OUTPATIENT
Start: 2023-08-30 | End: 2023-09-01 | Stop reason: CLARIF

## 2023-08-30 RX ORDER — ACYCLOVIR 400 MG/1
1 TABLET ORAL CONTINUOUS
Qty: 1 EACH | Refills: 0 | Status: SHIPPED | OUTPATIENT
Start: 2023-08-30 | End: 2023-09-01 | Stop reason: CLARIF

## 2023-08-30 RX ORDER — SEMAGLUTIDE 0.68 MG/ML
INJECTION, SOLUTION SUBCUTANEOUS
Qty: 9 ML | Refills: 1 | Status: SHIPPED | OUTPATIENT
Start: 2023-08-30

## 2023-08-30 NOTE — LETTER
August 30, 2023    Shital Atkinsonute  [address]  [address]  [facility] [address]      Stop metformin and Lantus  Start ozempic 0.25 mg weekly for 4 weeks then increase to 0.5 mg weekly  Start Jardiance 25 mg daily                          Martita Mcdonough PA-C

## 2023-08-30 NOTE — PATIENT INSTRUCTIONS
Stop metformin and Lantus  Start ozempic 0.25 mg weekly for 4 weeks then increase to 0.5 mg weekly  Start Jardiance 25 mg daily

## 2023-08-30 NOTE — LETTER
August 30, 2023     Halle Judd MD  [address] [facility] [address]  [facility] [address]    Patient: Shital Guzmán   YOB: 1965   Date of Visit: 8/30/2023     Dear Halle Judd MD:       Thank you for referring Shital Guzmán to me for evaluation. Below are the relevant portions of my assessment and plan of care.    If you have questions, please do not hesitate to call me. I look forward to following Shital along with you.         Sincerely,        Martita Mcdonough PA-C        CC: No Recipients    Martita Mcdonough PA-C  08/30/23 0846  Sign when Signing Visit  Chief Complaint  Establish care for Diabetes Mellitus.    HPI   Shital Guzmán is a 58 y.o. female who is here today for evaluation of Diabetes Mellitus type 2. The initial diagnosis of diabetes was made 2007.    She had lost a lot of weight and came off all diabetes meds in 2013. Was lost to follow up. Gained weight back and had a CVA in 2020. She has residual numbness left side of body. Able to ambulate without issues.   She is gaining weight. Would like to try medicine that may help with weight loss.     Diabetic complications: cerebrovascular disease  Eye exam current (within one year): updated 6/2023 - glaucoma    Current diabetic medications include:  Metformin 500 mg daily  Lantus 10 u daily    Statin: lipitor 80    Past medications: none    Diabetic Monitoring  - no meter or log for review, interested in CGM    The following portions of the patient's history were reviewed and updated by me as appropriate: allergies, current medications, past family history, past social history, past surgical history and problem list.    Past Medical History:   Diagnosis Date    Anemia     Colon polyp 2014    Diabetes insipidus     Essential hypertension 2011    GERD (gastroesophageal reflux disease)     H/O gonorrhea 10/2016    High cholesterol 2015    Intramural and subserous leiomyoma of uterus 2018    Type 2 diabetes mellitus 2007    Uterine fibroid  "2008       Medications    Current Outpatient Medications:     amLODIPine (NORVASC) 10 MG tablet, Take 1 tablet by mouth Every Night., Disp: 30 tablet, Rfl: 3    aspirin ([employer]S Aspirin Adult Low Dose) 81 MG chewable tablet, Chew 1 tablet Daily., Disp: 90 tablet, Rfl: 3    atorvastatin (LIPITOR) 80 MG tablet, Take 1 tablet by mouth Every Night., Disp: 30 tablet, Rfl: 3    Blood Glucose Monitoring Suppl (ONE TOUCH ULTRA 2) w/Device kit, USE TO CHECK GLUCOSE ONCE DAILY, Disp: , Rfl:     glucose blood test strip, Check sugar once a day, Disp: 30 each, Rfl: 12    Lancets 30G misc, Check sugar daily, Disp: 30 each, Rfl: 12    loratadine (CLARITIN) 10 MG tablet, Take 1 tablet by mouth Daily., Disp: 30 tablet, Rfl: 5    losartan (Cozaar) 100 MG tablet, Take 1 tablet by mouth Daily., Disp: 90 tablet, Rfl: 1    Continuous Blood Gluc  (Dexcom G7 ) device, Use 1 each Continuous., Disp: 1 each, Rfl: 0    Continuous Blood Gluc Sensor (Dexcom G7 Sensor) misc, Use 1 each Every 10 (Ten) Days., Disp: 9 each, Rfl: 3    empagliflozin (Jardiance) 25 MG tablet tablet, Take 1 tablet by mouth Daily., Disp: 90 tablet, Rfl: 1    Insulin Glargine (LANTUS SOLOSTAR) 100 UNIT/ML injection pen, Inject 10 Units under the skin into the appropriate area as directed Every Night for 30 days. For diabetes, Disp: 15 mL, Rfl: 0    Semaglutide,0.25 or 0.5MG/DOS, (Ozempic, 0.25 or 0.5 MG/DOSE,) 2 MG/3ML solution pen-injector, Start with 0.25 mg weekly for 4 weeks then increase to 0.5 mg weekly, Disp: 9 mL, Rfl: 1    Review of Systems  Review of Systems   Constitutional:  Positive for unexpected weight change (weight gain).   All other systems reviewed and are negative.     Physical Exam    /76   Pulse 82   Ht 157.5 cm (62\")   Wt 73.5 kg (162 lb)   SpO2 98%   BMI 29.63 kg/mý Body mass index is 29.63 kg/mý.  Physical Exam  Constitutional:       General: She is not in acute distress.     Appearance: She is well-developed. " She is not diaphoretic.   Eyes:      General: Lids are normal.   Neck:      Thyroid: No thyroid mass or thyromegaly.      Vascular: No JVD.      Trachea: No tracheal deviation.   Cardiovascular:      Rate and Rhythm: Normal rate and regular rhythm.      Pulses:           Dorsalis pedis pulses are 2+ on the right side and 2+ on the left side.        Posterior tibial pulses are 2+ on the right side and 2+ on the left side.      Heart sounds: Normal heart sounds. No murmur heard.  Pulmonary:      Effort: Pulmonary effort is normal. No respiratory distress.      Breath sounds: Normal breath sounds. No wheezing.   Musculoskeletal:         General: No tenderness.      Right foot: No deformity or Charcot foot.      Left foot: No deformity or Charcot foot.   Feet:      Right foot:      Protective Sensation: 5 sites tested.  5 sites sensed.      Skin integrity: No ulcer, blister, skin breakdown, erythema, warmth or callus.      Left foot:      Protective Sensation: 5 sites tested.  5 sites sensed.      Skin integrity: No ulcer, blister, skin breakdown, erythema, warmth or callus.      Comments: Diabetic Foot Exam Performed and Monofilament Test Performed, sensation in left foot decreased but not diminished from hx of CVA      Lymphadenopathy:      Cervical: No cervical adenopathy.   Skin:     General: Skin is warm and dry.      Findings: No erythema or rash.   Neurological:      Mental Status: She is alert and oriented to person, place, and time.   Psychiatric:         Speech: Speech normal.         Behavior: Behavior normal.         Thought Content: Thought content normal.       Labs and Imaging   Lab Results   Component Value Date    HGBA1C 7.5 08/30/2023    HGBA1C 8.40 (H) 05/18/2023    HGBA1C 7.7 01/31/2023     CMP          5/18/2023    09:04   CMP   Glucose 174    BUN 14    Creatinine 0.89    Sodium 146    Potassium 3.7    Chloride 102    Calcium 10.3    Total Protein 7.6    Albumin 4.7    Globulin 2.9    Total  Bilirubin 0.8    Alkaline Phosphatase 90    AST (SGOT) 16    ALT (SGPT) 14    BUN/Creatinine Ratio 15.7      CBC          5/18/2023    09:04   CBC   WBC 10.81    RBC 5.39    Hemoglobin 16.2    Hematocrit 47.7    MCV 88.5    MCH 30.1    MCHC 34.0    RDW 12.1    Platelets 330      Lipid Panel          5/18/2023    09:04   Lipid Panel   Total Cholesterol 231    Triglycerides 244    HDL Cholesterol 46    VLDL Cholesterol 44    LDL Cholesterol  141        Most Recent A1C          8/30/2023    08:15   HGBA1C Most Recent   Hemoglobin A1C 7.5      Microalbumin          5/18/2023    09:04   Microalbumin   Microalbumin, Urine 91.3        Assessment / Plan   Diagnoses and all orders for this visit:    1. Type 2 diabetes mellitus with hyperglycemia, without long-term current use of insulin (Primary)  -     POC Glycosylated Hemoglobin (Hb A1C)  -     POC Glucose, Blood  -     Semaglutide,0.25 or 0.5MG/DOS, (Ozempic, 0.25 or 0.5 MG/DOSE,) 2 MG/3ML solution pen-injector; Start with 0.25 mg weekly for 4 weeks then increase to 0.5 mg weekly  Dispense: 9 mL; Refill: 1  -     empagliflozin (Jardiance) 25 MG tablet tablet; Take 1 tablet by mouth Daily.  Dispense: 90 tablet; Refill: 1  -     Continuous Blood Gluc Sensor (Dexcom G7 Sensor) misc; Use 1 each Every 10 (Ten) Days.  Dispense: 9 each; Refill: 3  -     Continuous Blood Gluc  (Dexcom G7 ) device; Use 1 each Continuous.  Dispense: 1 each; Refill: 0        Diabetes Mellitus 2 is under inadequate control.  -A1c 7.5, down from 8.4 5/2023  -stop metformin and lantus  -add glp-1 agonist and sglt2i for added cv/renal benefits  -start ozempic 0.25 mg weekly for 4 weeks then increase to 0.5 mg weekly, risks reviewed  -Start Jardiance 25 mg daily  -Discussed avoiding sugary drinks  -Foot exam updated today, eye exam is up-to-date, labs are up-to-date      Patient Instructions   Stop metformin and Lantus  Start ozempic 0.25 mg weekly for 4 weeks then increase to 0.5 mg  weekly  Start Jardiance 25 mg daily      Follow up: Return in about 3 months (around 11/30/2023).    Signed by: Martita Mcdonough PA-C  Endocrinology

## 2023-08-30 NOTE — TELEPHONE ENCOUNTER
PATIENT IS CALLING STATING INSURANCE IS REQUIRING PRIOR AUTH FOR DEXCOM G7. SHE NEEDS US TO GET PRIOR AUTH. SHE WILL CALL PHARMACY TO MAKE SURE THEY SEND US THE DENIAL. PHONE NUMBER [phone]

## 2023-08-30 NOTE — PROGRESS NOTES
Chief Complaint  Establish care for Diabetes Mellitus.    HPI   Shital Guzmán is a 58 y.o. female who is here today for evaluation of Diabetes Mellitus type 2. The initial diagnosis of diabetes was made 2007.    She had lost a lot of weight and came off all diabetes meds in 2013. Was lost to follow up. Gained weight back and had a CVA in 2020. She has residual numbness left side of body. Able to ambulate without issues.   She is gaining weight. Would like to try medicine that may help with weight loss.     Diabetic complications: cerebrovascular disease  Eye exam current (within one year): updated 6/2023 - glaucoma    Current diabetic medications include:  Metformin 500 mg daily  Lantus 10 u daily    Statin: lipitor 80    Past medications: none    Diabetic Monitoring  - no meter or log for review, interested in CGM    The following portions of the patient's history were reviewed and updated by me as appropriate: allergies, current medications, past family history, past social history, past surgical history and problem list.    Past Medical History:   Diagnosis Date    Anemia     Colon polyp 2014    Diabetes insipidus     Essential hypertension 2011    GERD (gastroesophageal reflux disease)     H/O gonorrhea 10/2016    High cholesterol 2015    Intramural and subserous leiomyoma of uterus 2018    Type 2 diabetes mellitus 2007    Uterine fibroid 2008       Medications    Current Outpatient Medications:     amLODIPine (NORVASC) 10 MG tablet, Take 1 tablet by mouth Every Night., Disp: 30 tablet, Rfl: 3    aspirin ([employer] Aspirin Adult Low Dose) 81 MG chewable tablet, Chew 1 tablet Daily., Disp: 90 tablet, Rfl: 3    atorvastatin (LIPITOR) 80 MG tablet, Take 1 tablet by mouth Every Night., Disp: 30 tablet, Rfl: 3    Blood Glucose Monitoring Suppl (ONE TOUCH ULTRA 2) w/Device kit, USE TO CHECK GLUCOSE ONCE DAILY, Disp: , Rfl:     glucose blood test strip, Check sugar once a day, Disp: 30 each, Rfl: 12    Lancets 30G misc, Check  "sugar daily, Disp: 30 each, Rfl: 12    loratadine (CLARITIN) 10 MG tablet, Take 1 tablet by mouth Daily., Disp: 30 tablet, Rfl: 5    losartan (Cozaar) 100 MG tablet, Take 1 tablet by mouth Daily., Disp: 90 tablet, Rfl: 1    Continuous Blood Gluc  (Dexcom G7 ) device, Use 1 each Continuous., Disp: 1 each, Rfl: 0    Continuous Blood Gluc Sensor (Dexcom G7 Sensor) misc, Use 1 each Every 10 (Ten) Days., Disp: 9 each, Rfl: 3    empagliflozin (Jardiance) 25 MG tablet tablet, Take 1 tablet by mouth Daily., Disp: 90 tablet, Rfl: 1    Insulin Glargine (LANTUS SOLOSTAR) 100 UNIT/ML injection pen, Inject 10 Units under the skin into the appropriate area as directed Every Night for 30 days. For diabetes, Disp: 15 mL, Rfl: 0    Semaglutide,0.25 or 0.5MG/DOS, (Ozempic, 0.25 or 0.5 MG/DOSE,) 2 MG/3ML solution pen-injector, Start with 0.25 mg weekly for 4 weeks then increase to 0.5 mg weekly, Disp: 9 mL, Rfl: 1    Review of Systems  Review of Systems   Constitutional:  Positive for unexpected weight change (weight gain).   All other systems reviewed and are negative.     Physical Exam    /76   Pulse 82   Ht 157.5 cm (62\")   Wt 73.5 kg (162 lb)   SpO2 98%   BMI 29.63 kg/mý Body mass index is 29.63 kg/mý.  Physical Exam  Constitutional:       General: She is not in acute distress.     Appearance: She is well-developed. She is not diaphoretic.   Eyes:      General: Lids are normal.   Neck:      Thyroid: No thyroid mass or thyromegaly.      Vascular: No JVD.      Trachea: No tracheal deviation.   Cardiovascular:      Rate and Rhythm: Normal rate and regular rhythm.      Pulses:           Dorsalis pedis pulses are 2+ on the right side and 2+ on the left side.        Posterior tibial pulses are 2+ on the right side and 2+ on the left side.      Heart sounds: Normal heart sounds. No murmur heard.  Pulmonary:      Effort: Pulmonary effort is normal. No respiratory distress.      Breath sounds: Normal breath " sounds. No wheezing.   Musculoskeletal:         General: No tenderness.      Right foot: No deformity or Charcot foot.      Left foot: No deformity or Charcot foot.   Feet:      Right foot:      Protective Sensation: 5 sites tested.  5 sites sensed.      Skin integrity: No ulcer, blister, skin breakdown, erythema, warmth or callus.      Left foot:      Protective Sensation: 5 sites tested.  5 sites sensed.      Skin integrity: No ulcer, blister, skin breakdown, erythema, warmth or callus.      Comments: Diabetic Foot Exam Performed and Monofilament Test Performed, sensation in left foot decreased but not diminished from hx of CVA      Lymphadenopathy:      Cervical: No cervical adenopathy.   Skin:     General: Skin is warm and dry.      Findings: No erythema or rash.   Neurological:      Mental Status: She is alert and oriented to person, place, and time.   Psychiatric:         Speech: Speech normal.         Behavior: Behavior normal.         Thought Content: Thought content normal.       Labs and Imaging   Lab Results   Component Value Date    HGBA1C 7.5 08/30/2023    HGBA1C 8.40 (H) 05/18/2023    HGBA1C 7.7 01/31/2023     CMP          5/18/2023    09:04   CMP   Glucose 174    BUN 14    Creatinine 0.89    Sodium 146    Potassium 3.7    Chloride 102    Calcium 10.3    Total Protein 7.6    Albumin 4.7    Globulin 2.9    Total Bilirubin 0.8    Alkaline Phosphatase 90    AST (SGOT) 16    ALT (SGPT) 14    BUN/Creatinine Ratio 15.7      CBC          5/18/2023    09:04   CBC   WBC 10.81    RBC 5.39    Hemoglobin 16.2    Hematocrit 47.7    MCV 88.5    MCH 30.1    MCHC 34.0    RDW 12.1    Platelets 330      Lipid Panel          5/18/2023    09:04   Lipid Panel   Total Cholesterol 231    Triglycerides 244    HDL Cholesterol 46    VLDL Cholesterol 44    LDL Cholesterol  141        Most Recent A1C          8/30/2023    08:15   HGBA1C Most Recent   Hemoglobin A1C 7.5      Microalbumin          5/18/2023    09:04   Microalbumin    Microalbumin, Urine 91.3        Assessment / Plan   Diagnoses and all orders for this visit:    1. Type 2 diabetes mellitus with hyperglycemia, without long-term current use of insulin (Primary)  -     POC Glycosylated Hemoglobin (Hb A1C)  -     POC Glucose, Blood  -     Semaglutide,0.25 or 0.5MG/DOS, (Ozempic, 0.25 or 0.5 MG/DOSE,) 2 MG/3ML solution pen-injector; Start with 0.25 mg weekly for 4 weeks then increase to 0.5 mg weekly  Dispense: 9 mL; Refill: 1  -     empagliflozin (Jardiance) 25 MG tablet tablet; Take 1 tablet by mouth Daily.  Dispense: 90 tablet; Refill: 1  -     Continuous Blood Gluc Sensor (Dexcom G7 Sensor) misc; Use 1 each Every 10 (Ten) Days.  Dispense: 9 each; Refill: 3  -     Continuous Blood Gluc  (Dexcom G7 ) device; Use 1 each Continuous.  Dispense: 1 each; Refill: 0        Diabetes Mellitus 2 is under inadequate control.  -A1c 7.5, down from 8.4 5/2023  -stop metformin and lantus  -add glp-1 agonist and sglt2i for added cv/renal benefits  -start ozempic 0.25 mg weekly for 4 weeks then increase to 0.5 mg weekly, risks reviewed  -Start Jardiance 25 mg daily  -Discussed avoiding sugary drinks  -Foot exam updated today, eye exam is up-to-date, labs are up-to-date      Patient Instructions   Stop metformin and Lantus  Start ozempic 0.25 mg weekly for 4 weeks then increase to 0.5 mg weekly  Start Jardiance 25 mg daily      Follow up: Return in about 3 months (around 11/30/2023).    Signed by: Martita Mcdonough PA-C  Endocrinology

## 2023-08-31 ENCOUNTER — PRIOR AUTHORIZATION (OUTPATIENT)
Dept: ENDOCRINOLOGY | Facility: CLINIC | Age: 58
End: 2023-08-31
Payer: MEDICAID

## 2023-09-01 ENCOUNTER — TELEPHONE (OUTPATIENT)
Dept: ENDOCRINOLOGY | Facility: CLINIC | Age: 58
End: 2023-09-01
Payer: MEDICAID

## 2023-09-01 RX ORDER — PROCHLORPERAZINE 25 MG/1
1 SUPPOSITORY RECTAL DAILY
Qty: 1 EACH | Refills: 0 | Status: SHIPPED | OUTPATIENT
Start: 2023-09-01

## 2023-09-01 RX ORDER — PROCHLORPERAZINE 25 MG/1
1 SUPPOSITORY RECTAL
Qty: 1 EACH | Refills: 3 | Status: SHIPPED | OUTPATIENT
Start: 2023-09-01

## 2023-09-01 RX ORDER — PROCHLORPERAZINE 25 MG/1
SUPPOSITORY RECTAL
Qty: 3 EACH | Refills: 3 | Status: SHIPPED | OUTPATIENT
Start: 2023-09-01

## 2023-09-01 NOTE — TELEPHONE ENCOUNTER
Spoke with patient and advised the PA was denied because the G6 is preferred. She request we send in the G6 and submit the PA. Will submit PA once denial is received.

## 2023-09-01 NOTE — TELEPHONE ENCOUNTER
Pt called to say that [employer] sent us a denial for the dexcom   Did we get it?  Please call pt back  [phone]

## 2023-09-03 ENCOUNTER — HOSPITAL ENCOUNTER (EMERGENCY)
Facility: HOSPITAL | Age: 58
Discharge: HOME | End: 2023-09-04
Attending: EMERGENCY MEDICINE | Admitting: EMERGENCY MEDICINE
Payer: MEDICAID

## 2023-09-03 DIAGNOSIS — T78.2XXA ANAPHYLAXIS, INITIAL ENCOUNTER: Primary | ICD-10-CM

## 2023-09-03 PROCEDURE — 25010000002 METHYLPREDNISOLONE PER 125 MG: Performed by: EMERGENCY MEDICINE

## 2023-09-03 PROCEDURE — 96374 THER/PROPH/DIAG INJ IV PUSH: CPT

## 2023-09-03 PROCEDURE — 25010000002 DIPHENHYDRAMINE PER 50 MG: Performed by: EMERGENCY MEDICINE

## 2023-09-03 PROCEDURE — 25010000002 EPINEPHRINE (ANAPHYLAXIS) 1 MG/ML SOLUTION: Performed by: EMERGENCY MEDICINE

## 2023-09-03 PROCEDURE — 99283 EMERGENCY DEPT VISIT LOW MDM: CPT

## 2023-09-03 PROCEDURE — 96375 TX/PRO/DX INJ NEW DRUG ADDON: CPT

## 2023-09-03 PROCEDURE — 96372 THER/PROPH/DIAG INJ SC/IM: CPT

## 2023-09-03 RX ORDER — DIPHENHYDRAMINE HYDROCHLORIDE 50 MG/ML
25 INJECTION INTRAMUSCULAR; INTRAVENOUS ONCE
Status: COMPLETED | OUTPATIENT
Start: 2023-09-03 | End: 2023-09-03

## 2023-09-03 RX ORDER — EPINEPHRINE 1 MG/ML
0.3 INJECTION, SOLUTION INTRAMUSCULAR; SUBCUTANEOUS ONCE
Status: COMPLETED | OUTPATIENT
Start: 2023-09-03 | End: 2023-09-03

## 2023-09-03 RX ORDER — EPINEPHRINE 0.3 MG/.3ML
0.3 INJECTION SUBCUTANEOUS ONCE AS NEEDED
Qty: 1 EACH | Refills: 0 | Status: SHIPPED | OUTPATIENT
Start: 2023-09-03

## 2023-09-03 RX ORDER — FAMOTIDINE 10 MG/ML
20 INJECTION, SOLUTION INTRAVENOUS ONCE
Status: COMPLETED | OUTPATIENT
Start: 2023-09-03 | End: 2023-09-03

## 2023-09-03 RX ORDER — METHYLPREDNISOLONE SODIUM SUCCINATE 125 MG/2ML
125 INJECTION, POWDER, LYOPHILIZED, FOR SOLUTION INTRAMUSCULAR; INTRAVENOUS ONCE
Status: COMPLETED | OUTPATIENT
Start: 2023-09-03 | End: 2023-09-03

## 2023-09-03 RX ADMIN — FAMOTIDINE 20 MG: 10 INJECTION INTRAVENOUS at 21:48

## 2023-09-03 RX ADMIN — EPINEPHRINE 0.3 MG: 1 INJECTION INTRAMUSCULAR; INTRAVENOUS; SUBCUTANEOUS at 21:44

## 2023-09-03 RX ADMIN — DIPHENHYDRAMINE HYDROCHLORIDE 25 MG: 50 INJECTION INTRAMUSCULAR; INTRAVENOUS at 21:49

## 2023-09-03 RX ADMIN — METHYLPREDNISOLONE SODIUM SUCCINATE 125 MG: 125 INJECTION, POWDER, FOR SOLUTION INTRAMUSCULAR; INTRAVENOUS at 21:49

## 2023-09-04 VITALS
TEMPERATURE: 98.2 F | SYSTOLIC BLOOD PRESSURE: 157 MMHG | DIASTOLIC BLOOD PRESSURE: 87 MMHG | HEIGHT: 62 IN | WEIGHT: 160 LBS | HEART RATE: 94 BPM | RESPIRATION RATE: 16 BRPM | OXYGEN SATURATION: 95 % | BODY MASS INDEX: 29.44 KG/M2

## 2023-09-04 NOTE — DISCHARGE INSTRUCTIONS
If you have recurrent symptoms of an anaphylactic reaction including wheezing, difficulty breathing, hives with GI upset you should use the prescribed EpiPen.  You should follow-up with your primary care doctor.  Return to the ER as needed for new or worsening symptoms

## 2023-09-04 NOTE — ED PROVIDER NOTES
"Subjective   History of Present Illness  Patient presents for evaluation of symptoms including rash, itching to her face and neck and trunk along with gastrointestinal symptoms including crampy abdominal pain, nausea without vomiting.  She also feels if there is a lump in her throat.  The symptoms started approximately 30 minutes prior to arrival in the emergency room.  Patient states she believes she is having allergic reaction.  She started 2 new medications today including Ozempic and Jardiance.  She also received a pneumococcal vaccine today in her right shoulder.      Review of Systems    Past Medical History:   Diagnosis Date    Anemia     Colon polyp 2014    Diabetes insipidus     Essential hypertension 2011    GERD (gastroesophageal reflux disease)     H/O gonorrhea 10/2016    High cholesterol 2015    Intramural and subserous leiomyoma of uterus 2018    Type 2 diabetes mellitus 2007    Uterine fibroid 2008       Allergies   Allergen Reactions    Carvedilol Hives    Hydrochlorothiazide Hives    Losartan Potassium-Hctz Hives    Pneumococcal Vaccine Hives       Past Surgical History:   Procedure Laterality Date    COLONOSCOPY      [facility]22 [facility]    D & C HYSTEROSCOPY  09/18/2018    path was benign    LAPAROSCOPIC APPENDECTOMY  2018    LAPAROSCOPIC TUBAL LIGATION  1988       Family History   Problem Relation Age of Onset    Cancer Mother     Ovarian cancer Mother     Diabetes Mother     Hypertension Mother        Social History     Socioeconomic History    Marital status: Single   Tobacco Use    Smoking status: Never    Smokeless tobacco: Never   Vaping Use    Vaping Use: Never used   Substance and Sexual Activity    Alcohol use: Not Currently     Comment: social    Drug use: Yes     Types: \"Crack\" cocaine     Comment: patient stated she will occ. do cocaine 4-6 times a month    Sexual activity: Yes     Partners: Male     Birth control/protection: Condom, Tubal ligation, Vaginal insert contraception "           Objective   Physical Exam  Constitutional:       General: She is not in acute distress.  HENT:      Head: Normocephalic and atraumatic.   Eyes:      Conjunctiva/sclera: Conjunctivae normal.      Pupils: Pupils are equal, round, and reactive to light.   Cardiovascular:      Rate and Rhythm: Normal rate and regular rhythm.      Pulses: Normal pulses.      Heart sounds: No murmur heard.    No gallop.   Pulmonary:      Effort: Pulmonary effort is normal. No respiratory distress.      Breath sounds: No wheezing or rales.   Abdominal:      General: Abdomen is flat. There is no distension.      Tenderness: There is no abdominal tenderness.   Musculoskeletal:         General: No swelling or deformity. Normal range of motion.   Skin:     General: Skin is warm and dry.      Capillary Refill: Capillary refill takes less than 2 seconds.      Findings: Rash present.      Comments: Hives on the face, neck, upper trunk.   Neurological:      General: No focal deficit present.      Mental Status: She is alert and oriented to person, place, and time.   Psychiatric:         Mood and Affect: Mood normal.         Behavior: Behavior normal.       Procedures           ED Course                                           Medical Decision Making  Patient signs and symptoms meet criteria for anaphylaxis with exposure to likely allergen, persistent gastrointestinal and skin symptoms.  She is not hypotensive and is not having wheezing or respiratory compromise.  0.3 mg intramuscular epinephrine, 125 mg IV methylprednisolone, 20 mg IV famotidine, 25 mg IV Benadryl were given.    Patient was reassessed and is significantly improved.  She was observed for period of time to evaluate for potential rebound.  She was discharged from the ER in good condition    Problems Addressed:  Anaphylaxis, initial encounter: complicated acute illness or injury    Risk  Prescription drug management.        Final diagnoses:   Anaphylaxis, initial encounter  "      ED Disposition  ED Disposition       ED Disposition   Discharge    Condition   Stable    Comment   --             No results found for this or any previous visit (from the past 24 hour(s)).  Note: In addition to lab results from this visit, the labs listed above may include labs taken at another facility or during a different encounter within the last 24 hours. Please correlate lab times with ED admission and discharge times for further clarification of the services performed during this visit.    No orders to display     Vitals:    09/03/23 2117 09/04/23 0116 09/04/23 0117 09/04/23 0118   BP: 157/87      BP Location: Left arm      Patient Position: Sitting      Pulse: 110 96 97 94   Resp: 16      Temp: 98.2 °F (36.8 °C)      TempSrc: Oral      SpO2: 96% 95% 95% 95%   Weight: 72.6 kg (160 lb)      Height: 157.5 cm (62\")        Medications   methylPREDNISolone sodium succinate (SOLU-Medrol) injection 125 mg (125 mg Intravenous Given 9/3/23 2149)   EPINEPHrine (Anaphylaxis) (ADRENALIN) injection 0.3 mg (0.3 mg Intramuscular Given 9/3/23 2144)   famotidine (PEPCID) injection 20 mg (20 mg Intravenous Given 9/3/23 2148)   diphenhydrAMINE (BENADRYL) injection 25 mg (25 mg Intravenous Given 9/3/23 2149)     ECG/EMG Results (last 24 hours)       ** No results found for the last 24 hours. **          No orders to display         No follow-up provider specified.       Medication List        New Prescriptions      EPINEPHrine 0.3 MG/0.3ML solution auto-injector injection  Commonly known as: EPIPEN  Inject 0.3 mL under the skin into the appropriate area as directed 1 (One) Time As Needed (anaphylaxis) for up to 1 dose.               Where to Get Your Medications        These medications were sent to [facility] [address] [place] [address] - [address] [facility] - [phone] [facility] [phone] [address] [address]      Phone: [phone]   EPINEPHrine 0.3 MG/0.3ML solution auto-injector " injection            Joseluis Chapa MD  09/04/23 4302

## 2024-02-16 DIAGNOSIS — E78.2 MIXED HYPERLIPIDEMIA: ICD-10-CM

## 2024-02-16 DIAGNOSIS — I10 ACCELERATED ESSENTIAL HYPERTENSION: ICD-10-CM

## 2024-02-16 DIAGNOSIS — E11.65 TYPE 2 DIABETES MELLITUS WITH HYPERGLYCEMIA, WITHOUT LONG-TERM CURRENT USE OF INSULIN: Chronic | ICD-10-CM

## 2024-02-16 RX ORDER — AMLODIPINE BESYLATE 10 MG/1
10 TABLET ORAL DAILY
Qty: 30 TABLET | Refills: 0 | Status: SHIPPED | OUTPATIENT
Start: 2024-02-16

## 2024-02-16 RX ORDER — ATORVASTATIN CALCIUM 80 MG/1
80 TABLET, FILM COATED ORAL DAILY
Qty: 30 TABLET | Refills: 0 | Status: SHIPPED | OUTPATIENT
Start: 2024-02-16

## 2024-02-16 RX ORDER — LOSARTAN POTASSIUM 100 MG/1
100 TABLET ORAL DAILY
Qty: 90 TABLET | Refills: 0 | Status: SHIPPED | OUTPATIENT
Start: 2024-02-16

## 2024-03-18 DIAGNOSIS — I10 ACCELERATED ESSENTIAL HYPERTENSION: ICD-10-CM

## 2024-03-18 DIAGNOSIS — E78.2 MIXED HYPERLIPIDEMIA: ICD-10-CM

## 2024-03-18 RX ORDER — LOSARTAN POTASSIUM 100 MG/1
100 TABLET ORAL DAILY
Qty: 90 TABLET | Refills: 0 | OUTPATIENT
Start: 2024-03-18

## 2024-03-19 RX ORDER — AMLODIPINE BESYLATE 10 MG/1
10 TABLET ORAL DAILY
Qty: 30 TABLET | Refills: 0 | Status: SHIPPED | OUTPATIENT
Start: 2024-03-19

## 2024-03-19 RX ORDER — ATORVASTATIN CALCIUM 80 MG/1
80 TABLET, FILM COATED ORAL DAILY
Qty: 30 TABLET | Refills: 0 | Status: SHIPPED | OUTPATIENT
Start: 2024-03-19

## 2024-03-19 NOTE — TELEPHONE ENCOUNTER
All phone numbers listed for the patient are disconnected, unable to reach patient to schedule a follow up appt    LOV 01/31/2023

## 2024-03-21 RX ORDER — ASPIRIN 81 MG/1
81 TABLET, CHEWABLE ORAL DAILY
Qty: 90 TABLET | Refills: 0 | Status: SHIPPED | OUTPATIENT
Start: 2024-03-21

## 2024-03-21 RX ORDER — LOSARTAN POTASSIUM 100 MG/1
100 TABLET ORAL DAILY
Qty: 90 TABLET | Refills: 0 | OUTPATIENT
Start: 2024-03-21

## 2024-03-21 RX ORDER — ASPIRIN 81 MG/1
81 TABLET, CHEWABLE ORAL DAILY
Qty: 90 TABLET | Refills: 3 | OUTPATIENT
Start: 2024-03-21

## 2024-03-21 NOTE — TELEPHONE ENCOUNTER
Caller: Shital Guzmán    Relationship: Self    Best call back number: [phone]    Requested Prescriptions:   Requested Prescriptions     Pending Prescriptions Disp Refills    aspirin ([facility] Aspirin Adult Low Dose) 81 MG chewable tablet 90 tablet 3     Sig: Chew 1 tablet Daily.    losartan (COZAAR) 100 MG tablet 90 tablet 0     Sig: Take 1 tablet by mouth Daily.        Pharmacy where request should be sent: [address] [phone] [facility] [phone] [facility]     Last office visit with prescribing clinician: 7/3/2023   Last telemedicine visit with prescribing clinician: Visit date not found   Next office visit with prescribing clinician: 7/3/2024     Does the patient have less than a 3 day supply:  [x] Yes  [] No    Would you like a call back once the refill request has been completed: [] Yes [] No    If the office needs to give you a call back, can they leave a voicemail: [] Yes [] No    Juan J Mcelroy Rep   03/21/24 10:22 EDT

## 2024-05-05 DIAGNOSIS — I10 ACCELERATED ESSENTIAL HYPERTENSION: ICD-10-CM

## 2024-05-05 DIAGNOSIS — E11.65 TYPE 2 DIABETES MELLITUS WITH HYPERGLYCEMIA, WITHOUT LONG-TERM CURRENT USE OF INSULIN: Chronic | ICD-10-CM

## 2024-05-06 RX ORDER — LOSARTAN POTASSIUM 100 MG/1
100 TABLET ORAL DAILY
Qty: 90 TABLET | Refills: 0 | Status: SHIPPED | OUTPATIENT
Start: 2024-05-06

## 2024-05-07 RX ORDER — EMPAGLIFLOZIN 25 MG/1
25 TABLET, FILM COATED ORAL DAILY
Qty: 30 TABLET | Refills: 3 | Status: SHIPPED | OUTPATIENT
Start: 2024-05-07

## 2024-05-14 RX ORDER — AMLODIPINE BESYLATE 10 MG/1
10 TABLET ORAL DAILY
Qty: 30 TABLET | Refills: 0 | OUTPATIENT
Start: 2024-05-14

## 2024-05-14 NOTE — TELEPHONE ENCOUNTER
I have tried to call the patient but none of her phone numbers are currently active, LOV 01/31/2023

## 2024-05-31 ENCOUNTER — OFFICE VISIT (OUTPATIENT)
Dept: FAMILY MEDICINE CLINIC | Facility: CLINIC | Age: 59
End: 2024-05-31
Payer: MEDICAID

## 2024-05-31 VITALS
HEART RATE: 92 BPM | WEIGHT: 168 LBS | BODY MASS INDEX: 30.73 KG/M2 | SYSTOLIC BLOOD PRESSURE: 134 MMHG | DIASTOLIC BLOOD PRESSURE: 76 MMHG | TEMPERATURE: 97.8 F | OXYGEN SATURATION: 99 %

## 2024-05-31 DIAGNOSIS — I10 ACCELERATED ESSENTIAL HYPERTENSION: ICD-10-CM

## 2024-05-31 DIAGNOSIS — Z12.31 BREAST CANCER SCREENING BY MAMMOGRAM: ICD-10-CM

## 2024-05-31 DIAGNOSIS — L50.9 HIVES: ICD-10-CM

## 2024-05-31 DIAGNOSIS — E11.65 UNCONTROLLED TYPE 2 DIABETES MELLITUS WITH HYPERGLYCEMIA: Primary | ICD-10-CM

## 2024-05-31 DIAGNOSIS — E78.2 MIXED HYPERLIPIDEMIA: ICD-10-CM

## 2024-05-31 DIAGNOSIS — I10 ESSENTIAL HYPERTENSION: ICD-10-CM

## 2024-05-31 DIAGNOSIS — F32.89 OTHER DEPRESSION: ICD-10-CM

## 2024-05-31 DIAGNOSIS — N76.1 CHRONIC VAGINITIS: ICD-10-CM

## 2024-05-31 LAB
EXPIRATION DATE: ABNORMAL
EXPIRATION DATE: NORMAL
HBA1C MFR BLD: 8.6 % (ref 4.5–5.7)
Lab: ABNORMAL
Lab: NORMAL
POC CREATININE URINE: 8.8
POC MICROALBUMIN URINE: 30

## 2024-05-31 PROCEDURE — 1126F AMNT PAIN NOTED NONE PRSNT: CPT | Performed by: PHYSICIAN ASSISTANT

## 2024-05-31 PROCEDURE — 99214 OFFICE O/P EST MOD 30 MIN: CPT | Performed by: PHYSICIAN ASSISTANT

## 2024-05-31 PROCEDURE — 3075F SYST BP GE 130 - 139MM HG: CPT | Performed by: PHYSICIAN ASSISTANT

## 2024-05-31 PROCEDURE — 3052F HG A1C>EQUAL 8.0%<EQUAL 9.0%: CPT | Performed by: PHYSICIAN ASSISTANT

## 2024-05-31 PROCEDURE — 82044 UR ALBUMIN SEMIQUANTITATIVE: CPT | Performed by: PHYSICIAN ASSISTANT

## 2024-05-31 PROCEDURE — 3078F DIAST BP <80 MM HG: CPT | Performed by: PHYSICIAN ASSISTANT

## 2024-05-31 PROCEDURE — 83036 HEMOGLOBIN GLYCOSYLATED A1C: CPT | Performed by: PHYSICIAN ASSISTANT

## 2024-05-31 RX ORDER — AMLODIPINE BESYLATE 10 MG/1
10 TABLET ORAL DAILY
Qty: 90 TABLET | Refills: 1 | Status: SHIPPED | OUTPATIENT
Start: 2024-05-31

## 2024-05-31 RX ORDER — LEVOCETIRIZINE DIHYDROCHLORIDE 5 MG/1
5 TABLET, FILM COATED ORAL EVERY EVENING
Qty: 30 TABLET | Refills: 11 | Status: SHIPPED | OUTPATIENT
Start: 2024-05-31

## 2024-05-31 RX ORDER — FLUCONAZOLE 200 MG/1
TABLET ORAL
Qty: 10 TABLET | Refills: 1 | Status: SHIPPED | OUTPATIENT
Start: 2024-05-31

## 2024-05-31 RX ORDER — LOSARTAN POTASSIUM 100 MG/1
100 TABLET ORAL DAILY
Qty: 90 TABLET | Refills: 1 | Status: SHIPPED | OUTPATIENT
Start: 2024-05-31

## 2024-05-31 RX ORDER — ATORVASTATIN CALCIUM 80 MG/1
80 TABLET, FILM COATED ORAL DAILY
Qty: 90 TABLET | Refills: 3 | Status: SHIPPED | OUTPATIENT
Start: 2024-05-31

## 2024-05-31 NOTE — PROGRESS NOTES
Subjective   Shital Guzmán is a 59 y.o. female  Diabetes (Follow up diabetes, having yeast infections with Jardiance medication ), Hypertension (Refill on amlodipine ), and Hyperlipidemia (Refill on atorvastatin )      History of Present Illness  History of Present Illness  Shital Guzmán, date of birth 1965, is a 59-year-old female who is coming in for follow-up on hypertension, diabetes, and hyperlipidemia.    Diabetes  She continues to consult with her endocrinologist, Enrique, for her diabetes management, but anticipates a follow-up soon. Her hemoglobin A1c is 8.6%.     Yeast infections  She reports experiencing yeast infections, which she attributes to Jardiance. Despite this, she continues to take Jardiance, which induces fever and itching. She continues to use Ozempic injections. Additionally, she experiences a burning sensation during urination, which she attributes to her elevated glucose levels.    Hypertension  Her blood pressure medication was recently increased due to its ineffectiveness with one medication. However, she reports that the current two medications appear to be effective.    Hyperlipidemia  She continues her cholesterol medication regimen.    Depression  The patient reports feelings of depression, which prevents her from maintaining her job. She does not currently have a mental health specialist.    Health maintenance  She reports experiencing allergies, characterized by bumps and hives. She has discontinued the use of loratadine. These hives are causing her significant discomfort and occur without any apparent reason, causing her distress.  The patient suspects she may have arthritis in her toes. She reports persistent numbness on her left side since her stroke. She has a Mirena IUD in place, which has been in place for 5 years, and is due for removal because of excessive bleeding.. She has an upcoming appointment with her gynecologist. She is due for a mammogram and conveys she has an  appointment with her gynecologist.    The following portions of the patient's history were reviewed and updated as appropriate: allergies, current medications, past social history and problem list    Review of Systems   Constitutional:  Negative for fatigue and unexpected weight change.   Respiratory:  Negative for cough, chest tightness and shortness of breath.    Cardiovascular:  Negative for chest pain, palpitations and leg swelling.   Gastrointestinal:  Negative for nausea.   Genitourinary:  Positive for vaginal discharge and vaginal pain.   Musculoskeletal:  Positive for arthralgias.   Skin:  Positive for rash. Negative for color change.   Allergic/Immunologic: Positive for immunocompromised state.   Neurological:  Negative for dizziness, syncope, weakness and headaches.   Psychiatric/Behavioral:  Positive for dysphoric mood.        Objective     Vitals:    05/31/24 0904   BP: 134/76   Pulse: 92   Temp: 97.8 °F (36.6 °C)   SpO2: 99%       Physical Exam  Vitals and nursing note reviewed.   Constitutional:       General: She is not in acute distress.     Appearance: Normal appearance. She is well-developed. She is not ill-appearing, toxic-appearing or diaphoretic.   HENT:      Head: Normocephalic and atraumatic.   Neck:      Thyroid: No thyroid mass or thyromegaly.      Vascular: No carotid bruit or JVD.   Cardiovascular:      Rate and Rhythm: Normal rate and regular rhythm.      Pulses: Normal pulses.      Heart sounds: Normal heart sounds. No murmur heard.  Pulmonary:      Effort: Pulmonary effort is normal. No respiratory distress.      Breath sounds: Normal breath sounds.   Abdominal:      Palpations: Abdomen is soft.      Tenderness: There is no abdominal tenderness.   Skin:     General: Skin is warm and dry.      Findings: No lesion or rash.   Neurological:      Mental Status: She is alert and oriented to person, place, and time.   Psychiatric:         Attention and Perception: Attention and perception  normal. She is attentive.         Mood and Affect: Mood is anxious. Mood is not depressed. Affect is not angry or inappropriate.         Speech: Speech normal. Speech is rapid and pressured.         Behavior: Behavior normal.         Thought Content: Thought content normal.         Cognition and Memory: Cognition normal.         Judgment: Judgment normal.       Physical Exam      Assessment & Plan   Assessment & Plan      Diagnoses and all orders for this visit:    1. Uncontrolled type 2 diabetes mellitus with hyperglycemia (Primary)  -     POC Glycosylated Hemoglobin (Hb A1C)  -     POCT microalbumin    2. Essential hypertension    3. Mixed hyperlipidemia  -     atorvastatin (LIPITOR) 80 MG tablet; Take 1 tablet by mouth Daily.  Dispense: 90 tablet; Refill: 3    4. Breast cancer screening by mammogram  -     Mammo Screening Digital Tomosynthesis Bilateral With CAD; Future    5. Accelerated essential hypertension  -     amLODIPine (NORVASC) 10 MG tablet; Take 1 tablet by mouth Daily.  Dispense: 90 tablet; Refill: 1    6. Other depression  -     Ambulatory Referral to Psychiatry    7. Chronic vaginitis    8. Hives    Other orders  -     losartan (COZAAR) 100 MG tablet; Take 1 tablet by mouth Daily.  Dispense: 90 tablet; Refill: 1  -     fluconazole (DIFLUCAN) 200 MG tablet; Take one every other day for 10 doses for yeast infection  Dispense: 10 tablet; Refill: 1  -     levocetirizine (XYZAL) 5 MG tablet; Take 1 tablet by mouth Every Evening. For hives  Dispense: 30 tablet; Refill: 11     1. Diabetes Mellitus.  The patient's A1c level is currently at 8.6. The patient has been advised to consult with Enrique's office regarding her complications with her diabetes medications. The possibility of increasing her semaglutide dosage and discontinuation of Jardiance has been discussed.    2. Hypertension.  The patient's blood pressure is well-regulated. Prescriptions for her antihypertensive and cholesterol medications have  been renewed.    3. Yeast Infection.  A prescription for a yeast infection medication has been issued, with instructions to take one tablet every other day for a duration of 10 doses.    4. Depression.  The patient's numbness is likely due to her elevated blood glucose levels. A referral has been made to a psychiatrist    Patient or patient representative verbalized consent for the use of Ambient Listening during the visit with  Ela Brown PA-C for chart documentation. 5/31/2024  12:46 EDT        Transcribed from ambient dictation for Ela Brown PA-C by Mireya Rey.  05/31/24   10:22 EDT    Patient or patient representative verbalized consent to the visit recording.

## 2024-07-03 ENCOUNTER — OFFICE VISIT (OUTPATIENT)
Age: 59
End: 2024-07-03
Payer: MEDICAID

## 2024-07-03 ENCOUNTER — OFFICE VISIT (OUTPATIENT)
Dept: CARDIOLOGY | Facility: CLINIC | Age: 59
End: 2024-07-03
Payer: MEDICAID

## 2024-07-03 VITALS
HEART RATE: 94 BPM | OXYGEN SATURATION: 97 % | WEIGHT: 167.8 LBS | HEIGHT: 60 IN | SYSTOLIC BLOOD PRESSURE: 116 MMHG | DIASTOLIC BLOOD PRESSURE: 62 MMHG | BODY MASS INDEX: 32.94 KG/M2

## 2024-07-03 VITALS
OXYGEN SATURATION: 96 % | HEIGHT: 62 IN | BODY MASS INDEX: 31.03 KG/M2 | DIASTOLIC BLOOD PRESSURE: 84 MMHG | WEIGHT: 168.6 LBS | HEART RATE: 88 BPM | SYSTOLIC BLOOD PRESSURE: 132 MMHG

## 2024-07-03 DIAGNOSIS — F33.2 SEVERE EPISODE OF RECURRENT MAJOR DEPRESSIVE DISORDER, WITHOUT PSYCHOTIC FEATURES: ICD-10-CM

## 2024-07-03 DIAGNOSIS — I35.0 AORTIC STENOSIS, MILD: Primary | ICD-10-CM

## 2024-07-03 DIAGNOSIS — Z51.81 ENCOUNTER FOR THERAPEUTIC DRUG MONITORING: ICD-10-CM

## 2024-07-03 DIAGNOSIS — I10 ESSENTIAL HYPERTENSION: ICD-10-CM

## 2024-07-03 DIAGNOSIS — F43.10 PTSD (POST-TRAUMATIC STRESS DISORDER): Primary | ICD-10-CM

## 2024-07-03 DIAGNOSIS — E78.2 MIXED HYPERLIPIDEMIA: ICD-10-CM

## 2024-07-03 NOTE — PROGRESS NOTES
"[facility]  Office visit  Shital Guzmán  1965  [phone]  There is no work phone number on file.    VISIT DATE:  7/3/2024    PCP: Ela Brown PA-C  [id number]  [facility] [id number]    CC:  Chief Complaint   Patient presents with    Aortic stenosis, mild     PROBLEM LIST:   Acute right hemispheric CVA October 2021  Presentation with left sided paraesthesias  MRI brain 10/21/21: scattered small acute infarcts in right thalamus, right corona radiata/pericallosal white matter; chronic right basal ganglia lacunar infarct   MRA head and neck essentially normal   Echo 10/22/21: EF 56-60%, negative saline test, mild AS, mild LVH  Hypertension   Hyperlipidemia  DM2  Medical noncompliance  Cocaine use      ASSESSMENT:   Diagnosis Plan   1. Aortic stenosis, mild        2. Essential hypertension        3. Mixed hyperlipidemia            PLAN:  Aortic stenosis, mild: Stable exam.  Continue clinical follow-up with echocardiographic surveillance every 3 to 5 years.    Hypertension: Goal less than 130/80 mmHg.  Well-controlled, continue current medical therapy.    Hyperlipidemia: Goal LDL less than 70.  Continue current medical therapy.  Lipid profile pending.    Subjective  Interval assessment: Denies chest pain or dyspnea.  Blood pressures running less than 130/80 mmHg.  Persistent intermittent cocaine use.  Residual left-sided peripheral nerve discomfort and paresthesias which are chronic related to history of stroke.    Initial evaluation: Mrs. Guzmán is a 57 y/o female with above noted history whom we are seeing in consultation for acute stroke. She has a history of hypertension, hyperlipidemia and diabetes, however stopped taking all her medicine several  months ago as she thought she \"did not need it\" after losing some weight. She has since gained most of her weight back, however never went back on her medicines.  She presented to the hospital yesterday " "with a 1 day history of left sided paraesthesias. She reports numbness and tingling along her left arm and left side of face and head. She denies any other focal neurological deficits. MRI brain revealed small acute scattered infarcts in right thalamus, right corona radiata and pericallosal white matter as well as old right basal ganglia lacunar infarct. Her BP on arrival was 170s/100 mmHg, her glucose was 435, and her LDL is 199. She also reports doing cocaine occasionally, with last use about a month ago. No tobacco or alcohol use. She denies prior history of MI, CVA, DVT or PE. She denies any chest pain or unusual dyspnea. She has occasional brief palpitations which last only a few seconds. She has been fully vaccinated for Covid.      PHYSICAL EXAMINATION:  Vitals:    07/03/24 1418   BP: 116/62   BP Location: Right arm   Patient Position: Sitting   Cuff Size: Adult   Pulse: 94   SpO2: 97%   Weight: 76.1 kg (167 lb 12.8 oz)   Height: 152.4 cm (60\")     General Appearance:    Alert, cooperative, no distress, appears stated age   Head:    Normocephalic, without obvious abnormality, atraumatic   Eyes:    conjunctiva/corneas clear   Nose:   Nares normal, septum midline, mucosa normal, no drainage   Throat:   Lips, teeth and gums normal   Neck:   Supple, symmetrical, trachea midline, no carotid    bruit or JVD   Lungs:     Clear to auscultation bilaterally, respirations unlabored   Chest Wall:    No tenderness or deformity    Heart:    Regular rate and rhythm, S1 and S2 normal, 3 of 6 early peaking systolic murmur, right upper sternal border, no rub   or gallop, normal carotid impulse bilaterally without bruit.   Abdomen:     Soft, non-tender   Extremities:   Extremities normal, atraumatic, no cyanosis or edema   Pulses:   2+ and symmetric all extremities   Skin:   Skin color, texture, turgor normal, no rashes or lesions       Diagnostic Data:  Procedures  Lab Results   Component Value Date    CHLPL 231 (H) 05/18/2023 "    TRIG 244 (H) 05/18/2023    HDL 46 05/18/2023     Lab Results   Component Value Date    GLUCOSE 174 (H) 05/18/2023    BUN 14 05/18/2023    CREATININE 0.89 05/18/2023     (H) 05/18/2023    K 3.7 05/18/2023     05/18/2023    CO2 29.2 (H) 05/18/2023     Lab Results   Component Value Date    HGBA1C 8.6 (A) 05/31/2024     Lab Results   Component Value Date    WBC 10.81 (H) 05/18/2023    HGB 16.2 (H) 05/18/2023    HCT 47.7 (H) 05/18/2023     05/18/2023       Allergies  Allergies   Allergen Reactions    Carvedilol Hives    Hydrochlorothiazide Hives    Losartan Potassium-Hctz Hives    Pneumococcal Vaccine Hives       Current Medications    Current Outpatient Medications:     amLODIPine (NORVASC) 10 MG tablet, Take 1 tablet by mouth Daily., Disp: 90 tablet, Rfl: 1    aspirin ([employer] Aspirin Adult Low Dose) 81 MG chewable tablet, Chew 1 tablet Daily., Disp: 90 tablet, Rfl: 0    atorvastatin (LIPITOR) 80 MG tablet, Take 1 tablet by mouth Daily., Disp: 90 tablet, Rfl: 3    Blood Glucose Monitoring Suppl (ONE TOUCH ULTRA 2) w/Device kit, USE TO CHECK GLUCOSE ONCE DAILY, Disp: , Rfl:     Cariprazine HCl (Vraylar) 1.5 MG capsule capsule, Take 1 capsule by mouth Daily., Disp: 30 capsule, Rfl: 0    Continuous Blood Gluc  (Dexcom G6 ) device, Use 1 each Daily., Disp: 1 each, Rfl: 0    Continuous Blood Gluc Sensor (Dexcom G6 Sensor), Change sensors every 10 days. E11.65, Disp: 3 each, Rfl: 3    Continuous Blood Gluc Transmit (Dexcom G6 Transmitter) misc, Use 1 each Every 3 (Three) Months. E11.65, Disp: 1 each, Rfl: 3    empagliflozin (Jardiance) 25 MG tablet tablet, TAKE 1 TABLET BY MOUTH ONCE DAILY . APPOINTMENT REQUIRED FOR FUTURE REFILLS, Disp: 30 tablet, Rfl: 3    EPINEPHrine (EPIPEN) 0.3 MG/0.3ML solution auto-injector injection, Inject 0.3 mL under the skin into the appropriate area as directed 1 (One) Time As Needed (anaphylaxis) for up to 1 dose., Disp: 1 each, Rfl: 0    fluconazole (DIFLUCAN)  "200 MG tablet, Take one every other day for 10 doses for yeast infection, Disp: 10 tablet, Rfl: 1    glucose blood test strip, Check sugar once a day, Disp: 30 each, Rfl: 12    Lancets 30G misc, Check sugar daily, Disp: 30 each, Rfl: 12    levocetirizine (XYZAL) 5 MG tablet, Take 1 tablet by mouth Every Evening. For hives, Disp: 30 tablet, Rfl: 11    losartan (COZAAR) 100 MG tablet, Take 1 tablet by mouth Daily., Disp: 90 tablet, Rfl: 1    Semaglutide,0.25 or 0.5MG/DOS, (Ozempic, 0.25 or 0.5 MG/DOSE,) 2 MG/3ML solution pen-injector, Start with 0.25 mg weekly for 4 weeks then increase to 0.5 mg weekly, Disp: 9 mL, Rfl: 1          ROS  ROS      SOCIAL HX  Social History     Socioeconomic History    Marital status: Single   Tobacco Use    Smoking status: Never    Smokeless tobacco: Never   Vaping Use    Vaping status: Never Used   Substance and Sexual Activity    Alcohol use: Yes     Comment: social    Drug use: Yes     Types: \"Crack\" cocaine, Marijuana    Sexual activity: Yes     Partners: Male     Birth control/protection: Condom, Tubal ligation, Vaginal insert contraception       FAMILY HX  Family History   Problem Relation Age of Onset    Cancer Mother     Ovarian cancer Mother     Diabetes Mother     Hypertension Mother              Faraz Chu III, MD, FACC      "
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
## 2024-07-05 ENCOUNTER — PRIOR AUTHORIZATION (OUTPATIENT)
Age: 59
End: 2024-07-05
Payer: MEDICAID

## 2024-07-05 NOTE — TELEPHONE ENCOUNTER
Vraylar 1.5MG capsules    Form  [place] [employer] Prior Authorization Form    Key: [id number]    Prior Authorization sent through [employer]    This PA has been accessed, but not sent to the plan

## 2024-07-08 NOTE — TELEPHONE ENCOUNTER
"PA sent to plan.    \"Your PA has been faxed to the plan as a paper copy. Please contact the plan directly if you haven't received a determination in a typical timeframe.    You will be notified of the determination electronically and via fax.\"    "

## 2024-07-09 NOTE — TELEPHONE ENCOUNTER
Called Pt to inform the PA was approved and Pt can  Rx Cariprazine HCl (Vraylar) 1.5 MG at [place], [place] - [address] [place] [address]     Advised Pt to call clinic back if needed

## 2024-07-10 LAB
1OH-MIDAZOLAM UR QL SCN: NOT DETECTED NG/MG CREAT
6MAM UR QL SCN: NEGATIVE NG/ML
7AMINOCLONAZEPAM/CREAT UR: NOT DETECTED NG/MG CREAT
A-OH ALPRAZ/CREAT UR: NOT DETECTED NG/MG CREAT
A-OH-TRIAZOLAM/CREAT UR CFM: NOT DETECTED NG/MG CREAT
ACP UR QL CFM: NOT DETECTED
ALPRAZ/CREAT UR CFM: NOT DETECTED NG/MG CREAT
AMPHETAMINES UR QL SCN: NEGATIVE NG/ML
APAP UR QL SCN: NEGATIVE UG/ML
BARBITURATES UR QL SCN: NEGATIVE NG/ML
BENZODIAZ SCN METH UR: NEGATIVE
BUPRENORPHINE UR QL SCN: NEGATIVE
BUPRENORPHINE/CREAT UR: NOT DETECTED NG/MG CREAT
BZE UR CFM-MCNC: >7143 NG/MG CREAT
CANNABINOIDS UR QL SCN: NEGATIVE NG/ML
CARISOPRODOL UR QL: NEGATIVE NG/ML
CLONAZEPAM/CREAT UR CFM: NOT DETECTED NG/MG CREAT
COCAETHYLENE/CREAT UR CFM: NOT DETECTED NG/MG CREAT
COCAINE UR QL: NORMAL
COCAINE+BZE UR QL SCN: NORMAL NG/ML
COCAINE/CREAT UR CFM: 2321 NG/MG CREAT
CREAT UR-MCNC: 70 MG/DL
D-METHORPHAN UR-MCNC: NOT DETECTED NG/ML
D-METHORPHAN+LEVORPHANOL UR QL: NOT DETECTED
DESALKYLFLURAZ/CREAT UR: NOT DETECTED NG/MG CREAT
DIAZEPAM/CREAT UR: NOT DETECTED NG/MG CREAT
ETHANOL UR QL SCN: NEGATIVE G/DL
ETHANOL UR QL SCN: NEGATIVE NG/ML
FENTANYL CTO UR SCN-MCNC: NEGATIVE NG/ML
FENTANYL/CREAT UR: NOT DETECTED NG/MG CREAT
FLUNITRAZEPAM UR QL SCN: NOT DETECTED NG/MG CREAT
GABAPENTIN UR-MCNC: NEGATIVE UG/ML
HALLUCINOGENS UR: NEGATIVE
HYPNOTICS UR QL SCN: NEGATIVE
KETAMINE UR QL: NOT DETECTED
LORAZEPAM/CREAT UR: NOT DETECTED NG/MG CREAT
MEPERIDINE UR QL SCN: NEGATIVE NG/ML
METHADONE UR QL SCN: NEGATIVE NG/ML
METHADONE+METAB UR QL SCN: NEGATIVE NG/ML
MIDAZOLAM/CREAT UR CFM: NOT DETECTED NG/MG CREAT
MISCELLANEOUS, UR: NEGATIVE
NORBUPRENORPHINE/CREAT UR: NOT DETECTED NG/MG CREAT
NORDIAZEPAM/CREAT UR: NOT DETECTED NG/MG CREAT
NORFENTANYL/CREAT UR: NOT DETECTED NG/MG CREAT
NORFLUNITRAZEPAM UR-MCNC: NOT DETECTED NG/MG CREAT
NORKETAMINE UR-MCNC: NOT DETECTED UG/ML
OPIATES UR SCN-MCNC: NEGATIVE NG/ML
OXAZEPAM/CREAT UR: NOT DETECTED NG/MG CREAT
OXYCODONE CTO UR SCN-MCNC: NEGATIVE NG/ML
PCP UR QL SCN: NEGATIVE NG/ML
PRESCRIBED MEDICATIONS: NORMAL
PROPOXYPH UR QL SCN: NEGATIVE NG/ML
TAPENTADOL CTO UR SCN-MCNC: NEGATIVE NG/ML
TEMAZEPAM/CREAT UR: NOT DETECTED NG/MG CREAT
TRAMADOL UR QL SCN: NEGATIVE NG/ML
ZALEPLON UR-MCNC: NOT DETECTED NG/ML
ZOLPIDEM PHENYL-4-CARB UR QL SCN: NOT DETECTED
ZOLPIDEM UR QL SCN: NOT DETECTED
ZOPICLONE-N-OXIDE UR-MCNC: NOT DETECTED NG/ML

## 2024-07-26 ENCOUNTER — HOSPITAL ENCOUNTER (OUTPATIENT)
Dept: MAMMOGRAPHY | Facility: HOSPITAL | Age: 59
Discharge: HOME | End: 2024-07-26
Admitting: PHYSICIAN ASSISTANT
Payer: MEDICAID

## 2024-07-26 DIAGNOSIS — Z12.31 BREAST CANCER SCREENING BY MAMMOGRAM: ICD-10-CM

## 2024-07-26 PROCEDURE — 77063 BREAST TOMOSYNTHESIS BI: CPT

## 2024-07-26 PROCEDURE — 77067 SCR MAMMO BI INCL CAD: CPT

## 2024-08-14 DIAGNOSIS — E11.65 TYPE 2 DIABETES MELLITUS WITH HYPERGLYCEMIA, WITHOUT LONG-TERM CURRENT USE OF INSULIN: Chronic | ICD-10-CM

## 2024-08-15 NOTE — TELEPHONE ENCOUNTER
Rx Refill Note    Requested Prescriptions     Pending Prescriptions Disp Refills    Jardiance 25 MG tablet tablet [Pharmacy Med Name: Jardiance 25 MG Oral Tablet] 90 tablet 0     Sig: TAKE 1 TABLET BY MOUTH ONCE DAILY . APPOINTMENT REQUIRED FOR FUTURE REFILLS        Last office visit with prescribing clinician: 8/30/2023         Next office visit with prescribing clinician: 8/27/2024

## 2024-08-16 ENCOUNTER — PRIOR AUTHORIZATION (OUTPATIENT)
Dept: ENDOCRINOLOGY | Facility: CLINIC | Age: 59
End: 2024-08-16
Payer: MEDICAID

## 2024-08-16 RX ORDER — EMPAGLIFLOZIN 25 MG/1
25 TABLET, FILM COATED ORAL DAILY
Qty: 90 TABLET | Refills: 0 | Status: SHIPPED | OUTPATIENT
Start: 2024-08-16

## 2024-08-16 NOTE — TELEPHONE ENCOUNTER
PA started on CMM for Jardiance 25 mg    Shital Guzmán (Key: [id number])  PA Case ID #: [id number]  Rx #: [id number]  Need Help? Call us at [phone]  Outcome  Approved today by [place] Medicaid MedImpact 2017  The request has been approved. The authorization is effective from 08/16/2024 to 08/16/2025, as long as the member is enrolled in their current health plan. A written notification letter will follow with additional details.  Authorization Expiration Date: 8/15/2025  Drug  Jardiance 25MG tablets  ePA cloud logo  Form  MedImpact [place] Medicaid ePA Form 2017 NCPDP  Original Claim Info  75    Phar notified

## 2024-08-31 DIAGNOSIS — E11.65 TYPE 2 DIABETES MELLITUS WITH HYPERGLYCEMIA, WITHOUT LONG-TERM CURRENT USE OF INSULIN: Chronic | ICD-10-CM

## 2024-09-03 RX ORDER — SEMAGLUTIDE 0.68 MG/ML
INJECTION, SOLUTION SUBCUTANEOUS
Qty: 3 ML | Refills: 0 | OUTPATIENT
Start: 2024-09-03

## 2024-09-07 DIAGNOSIS — E11.65 TYPE 2 DIABETES MELLITUS WITH HYPERGLYCEMIA, WITHOUT LONG-TERM CURRENT USE OF INSULIN: Chronic | ICD-10-CM

## 2024-09-09 RX ORDER — ASPIRIN 81 MG/1
81 TABLET, CHEWABLE ORAL DAILY
Qty: 90 TABLET | Refills: 0 | Status: SHIPPED | OUTPATIENT
Start: 2024-09-09

## 2024-09-09 RX ORDER — SEMAGLUTIDE 0.68 MG/ML
INJECTION, SOLUTION SUBCUTANEOUS
Qty: 3 ML | Refills: 0 | OUTPATIENT
Start: 2024-09-09

## 2024-10-23 ENCOUNTER — HOSPITAL ENCOUNTER (OUTPATIENT)
Dept: MAMMOGRAPHY | Facility: HOSPITAL | Age: 59
Discharge: HOME | End: 2024-10-23
Admitting: RADIOLOGY
Payer: MEDICAID

## 2024-10-23 DIAGNOSIS — R92.8 ABNORMAL MAMMOGRAM: ICD-10-CM

## 2024-10-23 LAB
NCCN CRITERIA FLAG: NORMAL
TYRER CUZICK SCORE: 5.1

## 2024-10-23 PROCEDURE — 77066 DX MAMMO INCL CAD BI: CPT

## 2024-10-23 PROCEDURE — G0279 TOMOSYNTHESIS, MAMMO: HCPCS

## 2024-10-30 DIAGNOSIS — E11.65 TYPE 2 DIABETES MELLITUS WITH HYPERGLYCEMIA, WITHOUT LONG-TERM CURRENT USE OF INSULIN: Chronic | ICD-10-CM

## 2024-10-30 RX ORDER — SEMAGLUTIDE 0.68 MG/ML
INJECTION, SOLUTION SUBCUTANEOUS
Qty: 9 ML | Refills: 1 | OUTPATIENT
Start: 2024-10-30

## 2024-10-30 NOTE — TELEPHONE ENCOUNTER
Rx Refill Note    Requested Prescriptions     Pending Prescriptions Disp Refills    Semaglutide,0.25 or 0.5MG/DOS, (Ozempic, 0.25 or 0.5 MG/DOSE,) 2 MG/3ML solution pen-injector 9 mL 1     Sig: Start with 0.25 mg weekly for 4 weeks then increase to 0.5 mg weekly        Last office visit with prescribing clinician: 8/30/2023         Next office visit with prescribing clinician: 3/14/2025

## 2024-10-30 NOTE — TELEPHONE ENCOUNTER
Caller: Shital Guzmán    Relationship: Self    Best call back number: [phone]      Requested Prescriptions:   Requested Prescriptions     Pending Prescriptions Disp Refills    Semaglutide,0.25 or 0.5MG/DOS, (Ozempic, 0.25 or 0.5 MG/DOSE,) 2 MG/3ML solution pen-injector 9 mL 1     Sig: Start with 0.25 mg weekly for 4 weeks then increase to 0.5 mg weekly        Pharmacy where request should be sent:    [address] - [address] [facility] - [phone] [place] [phone]    [address] [facility] [address]   Phone: [phone] Fax: [phone]   Hours: Not open 24 hours     Last office visit with prescribing clinician: 8/30/2023   Last telemedicine visit with prescribing clinician: Visit date not found   Next office visit with prescribing clinician: 3/14/2025     Additional details provided by patient: PT IS OUT OF MEDICATION PLEASE ADVISE     Does the patient have less than a 3 day supply:  [x] Yes  [] No    Would you like a call back once the refill request has been completed: [x] Yes [] No    If the office needs to give you a call back, can they leave a voicemail: [x] Yes [] No    Sulaiman Lazaro   10/30/24 13:33 EDT

## 2024-12-01 DIAGNOSIS — I10 ACCELERATED ESSENTIAL HYPERTENSION: ICD-10-CM

## 2024-12-02 RX ORDER — AMLODIPINE BESYLATE 10 MG/1
10 TABLET ORAL DAILY
Qty: 90 TABLET | Refills: 0 | Status: SHIPPED | OUTPATIENT
Start: 2024-12-02

## 2024-12-10 ENCOUNTER — TELEMEDICINE (OUTPATIENT)
Dept: PSYCHIATRY | Facility: CLINIC | Age: 59
End: 2024-12-10
Payer: MEDICAID

## 2024-12-10 DIAGNOSIS — F33.1 MDD (MAJOR DEPRESSIVE DISORDER), RECURRENT EPISODE, MODERATE: Primary | ICD-10-CM

## 2024-12-10 DIAGNOSIS — F41.1 GAD (GENERALIZED ANXIETY DISORDER): ICD-10-CM

## 2024-12-10 DIAGNOSIS — F19.90 SUBSTANCE USE DISORDER: ICD-10-CM

## 2024-12-10 DIAGNOSIS — F10.90 ALCOHOL USE DISORDER: ICD-10-CM
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
## 2024-12-12 NOTE — PATIENT INSTRUCTIONS
[place] warm Line: Phone number: [phone]      Monday through Friday 10 AM to 9 PM and Saturdays 5 PM to 9 PM.      A warmline is a NON-CRISIS number to call to get emotional support. You can call to have a conversation with someone who can provide support during hard times. Warmlines are staffed by trained peers who have been through their own mental health struggles and know what it's like to need help.      -Suicide hotline number: 988      -[facility] Resource Connection      The resource line is dedicated to providing behavioral health resources to residents of [place] and [facility], seven days a week, 7 a.m.-7 p.m.      [phone]      [email or web address]  [email or web address]      Should you ever need assistance or just want to reach out to someone when your behavioral health provider is not available due to the office being closed you can contact [email or web address].       -Just text HOME to 295969 and someone will reach out to you within a few minutes.  This is a 24/7 help line and they are open even on holidays.

## 2024-12-19 RX ORDER — LOSARTAN POTASSIUM 100 MG/1
100 TABLET ORAL DAILY
Qty: 30 TABLET | Refills: 0 | Status: SHIPPED | OUTPATIENT
Start: 2024-12-19

## 2024-12-31 ENCOUNTER — OFFICE VISIT (OUTPATIENT)
Dept: OBSTETRICS AND GYNECOLOGY | Facility: CLINIC | Age: 59
End: 2024-12-31
Payer: MEDICAID

## 2024-12-31 VITALS
RESPIRATION RATE: 14 BRPM | SYSTOLIC BLOOD PRESSURE: 130 MMHG | WEIGHT: 170 LBS | BODY MASS INDEX: 33.2 KG/M2 | DIASTOLIC BLOOD PRESSURE: 78 MMHG

## 2024-12-31 DIAGNOSIS — Z30.432 ENCOUNTER FOR REMOVAL OF INTRAUTERINE CONTRACEPTIVE DEVICE: ICD-10-CM

## 2024-12-31 DIAGNOSIS — L29.2 VULVAR ITCHING: Primary | ICD-10-CM

## 2024-12-31 DIAGNOSIS — B37.31 CANDIDIASIS OF VAGINA: ICD-10-CM

## 2024-12-31 PROBLEM — I63.9 ACUTE STROKE DUE TO ISCHEMIA: Status: RESOLVED | Noted: 2021-10-21 | Resolved: 2024-12-31

## 2024-12-31 RX ORDER — TERCONAZOLE 4 MG/G
1 CREAM VAGINAL NIGHTLY
Qty: 45 G | Refills: 0 | Status: SHIPPED | OUTPATIENT
Start: 2024-12-31 | End: 2025-01-07

## 2024-12-31 NOTE — PROGRESS NOTES
Subjective   Chief Complaint   Patient presents with    Gynecologic Exam     Shital Guzmán is a 59 y.o. year old .  No LMP recorded. Patient has had an implant.  She comes today to reestablish care.  I last saw her in early .  She has an IUD in place but is menopausal.  Main reason she comes in today relates to ongoing vulvar itching which she presumes relates to her treatment for her diabetes with Jardiance.  Currently she is not using anything to treat this.      The following portions of the patient's history were reviewed and updated as appropriate:current medications, allergies, past family history, past medical history, past social history, and past surgical history    Social History    Tobacco Use      Smoking status: Never      Smokeless tobacco: Never    Review of Systems  Constitutional POS: nothing reported    NEG: anorexia or night sweats   Genitourinary POS: nothing reported    NEG: dysuria or hematuria   Gastointestinal POS: nothing reported    NEG: bloating, change in bowel habits, melena, or reflux symptoms   Integument POS: nothing reported    NEG: moles that are changing in size, shape, color or rashes   Breast POS: nothing reported    NEG: persistent breast lump, skin dimpling, or nipple discharge         Objective   /78   Resp 14   Wt 77.1 kg (170 lb)   BMI 33.20 kg/m²     General:  well developed; well nourished  no acute distress  mentation appropriate   Pelvis: Clinical staff was present for exam  External genitalia:  normal appearance of the external genitalia including Bartholin's and [place]'s glands.  :  urethral meatus normal;  Vaginal:  normal pink mucosa without prolapse or lesions. discharge present -  white and thick;  Cervix:  normal appearance. IUD string present - 2 cms in length;     Lab Review   No data reviewed    Imaging   No data reviewed        Assessment   Vulvar itching related to candidiasis  Mirena in place     Plan   The importance of keeping all  planned follow-up and taking all medications as prescribed was emphasized.  Follow up for IUD removal today     New Medications Ordered This Visit   Medications    terconazole (TERAZOL 7) 0.4 % vaginal cream     Sig: Insert 1 applicator into the vagina Every Night for 7 days.     Dispense:  45 g     Refill:  0          This note was electronically signed.    Herve Weiss M.D.  December 31, 2024    Part of this note may be an electronic transcription/translation of spoken language to printed text using the Dragon Dictation System.

## 2024-12-31 NOTE — PROGRESS NOTES
IUD Removal    Date of procedure:  12/31/2024    Risks and benefits discussed? yes  All questions answered? yes  Consents given by The patient  Written consent obtained? yes  Reason for removal: Menopause    Local anesthesia used:  no    Procedure documentation:    A speculum was placed in order to view the cervix.  A tenaculum did not need to be placed on the anterior cervical lip.  Cervical dilation did not need to be performed in order to access the string.  The IUD string was easily seen.  The string was grasped and the IUD was removed without difficulty.  The IUD did not appear to be adherent to the uterine cavity. It was removed intact.    She tolerated the procedure without any difficulty.     Post procedure instructions: Patient notified to call with heavy bleeding, fever or increasing pain.    Follow up PRN     This note was electronically signed.    Herve Weiss M.D.  December 31, 2024

## 2025-01-03 ENCOUNTER — PATIENT ROUNDING (BHMG ONLY) (OUTPATIENT)
Dept: OBSTETRICS AND GYNECOLOGY | Facility: CLINIC | Age: 60
End: 2025-01-03

## 2025-01-03 ENCOUNTER — TELEPHONE (OUTPATIENT)
Dept: PSYCHIATRY | Facility: CLINIC | Age: 60
End: 2025-01-03

## 2025-01-03 NOTE — PROGRESS NOTES
My name is StephanieMILI    I am with TONIA CORREA  [facility]  [address]  [place] [address] [phone]    I want to officially welcome you to our practice and ask about your recent visit.    Tell me about your visit with us.  What things went well?    We're always looking for ways to make our patients' experiences even better.  Do you have recommendations on ways we may improve?    Overall were you satisfied with your first visit to our practice?    I appreciate you taking the time to answer a few questions today.  Is there anything else I can do for you?    Thank you, and have a great day.

## 2025-01-13 ENCOUNTER — TELEPHONE (OUTPATIENT)
Dept: PSYCHIATRY | Facility: CLINIC | Age: 60
End: 2025-01-13

## 2025-01-13 NOTE — TELEPHONE ENCOUNTER
Tried calling patient to schedule follow up appointment with Emelia Celeste, no answer or voice mail option. Will send the patient a Stock[employer] message to call the office.

## 2025-01-20 ENCOUNTER — HOSPITAL ENCOUNTER (EMERGENCY)
Facility: HOSPITAL | Age: 60
Discharge: HOME | End: 2025-01-20
Attending: STUDENT IN AN ORGANIZED HEALTH CARE EDUCATION/TRAINING PROGRAM | Admitting: STUDENT IN AN ORGANIZED HEALTH CARE EDUCATION/TRAINING PROGRAM
Payer: MEDICAID

## 2025-01-20 ENCOUNTER — APPOINTMENT (OUTPATIENT)
Dept: GENERAL RADIOLOGY | Facility: HOSPITAL | Age: 60
End: 2025-01-20
Payer: MEDICAID

## 2025-01-20 VITALS
OXYGEN SATURATION: 96 % | TEMPERATURE: 98.8 F | WEIGHT: 160 LBS | HEART RATE: 105 BPM | HEIGHT: 60 IN | BODY MASS INDEX: 31.41 KG/M2 | DIASTOLIC BLOOD PRESSURE: 102 MMHG | RESPIRATION RATE: 14 BRPM | SYSTOLIC BLOOD PRESSURE: 145 MMHG

## 2025-01-20 DIAGNOSIS — E11.65 TYPE 2 DIABETES MELLITUS WITH HYPERGLYCEMIA, WITHOUT LONG-TERM CURRENT USE OF INSULIN: Chronic | ICD-10-CM

## 2025-01-20 DIAGNOSIS — J98.4 PNEUMONITIS: Primary | ICD-10-CM

## 2025-01-20 DIAGNOSIS — R91.8 MASS OF UPPER LOBE OF LEFT LUNG: ICD-10-CM

## 2025-01-20 PROCEDURE — 99283 EMERGENCY DEPT VISIT LOW MDM: CPT

## 2025-01-20 PROCEDURE — 71046 X-RAY EXAM CHEST 2 VIEWS: CPT

## 2025-01-20 RX ORDER — EMPAGLIFLOZIN 25 MG/1
25 TABLET, FILM COATED ORAL DAILY
Qty: 90 TABLET | Refills: 0 | OUTPATIENT
Start: 2025-01-20

## 2025-01-20 RX ORDER — DOXYCYCLINE 100 MG/1
100 CAPSULE ORAL 2 TIMES DAILY
Qty: 20 CAPSULE | Refills: 0 | Status: SHIPPED | OUTPATIENT
Start: 2025-01-20

## 2025-01-20 RX ORDER — DEXTROMETHORPHAN HYDROBROMIDE AND PROMETHAZINE HYDROCHLORIDE 15; 6.25 MG/5ML; MG/5ML
5 SYRUP ORAL 4 TIMES DAILY PRN
Qty: 118 ML | Refills: 0 | Status: SHIPPED | OUTPATIENT
Start: 2025-01-20

## 2025-01-20 NOTE — Clinical Note
[facility] EMERGENCY DEPARTMENT  [address]  [facility] [address]  Phone: [phone]    Shital Guzmán was seen and treated in our emergency department on 1/20/2025.  She may return to work on 01/21/2025.         Thank you for choosing [facility].    Faraz Haynes PA

## 2025-01-20 NOTE — ED PROVIDER NOTES
Subjective   History of Present Illness  59-year-old female presents emergency department today with cough for 2 weeks and then coughed up some blood today.  She reports that she had a little bleeding from her nose because it has been so dry.  States she had a cup productive cough going on for couple weeks does not seem to be getting better today she coughed up some bright red blood.  She has not had any chest pain.  She is not having shortness of breath.  She does not smoke and has no chronic lung disease.    History provided by:  Patient   used: No    Cough  Cough characteristics:  Productive  Sputum characteristics:  Bloody  Severity:  Mild  Onset quality:  Sudden  Progression:  Resolved  Chronicity:  New  Smoker: no    Context: upper respiratory infection    Context: not animal exposure, not exposure to allergens and not smoke exposure    Relieved by:  Nothing  Worsened by:  Activity  Ineffective treatments:  Rest and decongestant  Associated symptoms: no chest pain, no diaphoresis, no ear pain, no eye discharge, no fever, no headaches, no myalgias, no rhinorrhea, no shortness of breath, no weight loss and no wheezing    Risk factors: recent infection    Risk factors: no chemical exposure and no recent travel        Review of Systems   Constitutional:  Negative for diaphoresis, fever and weight loss.   HENT:  Negative for ear pain and rhinorrhea.    Eyes:  Negative for discharge.   Respiratory:  Positive for cough. Negative for shortness of breath and wheezing.    Cardiovascular:  Negative for chest pain.   Genitourinary:  Negative for dysuria, frequency and urgency.   Musculoskeletal:  Negative for myalgias.   Neurological:  Negative for headaches.       Past Medical History:   Diagnosis Date    Anemia     Colon polyp 2014    Essential hypertension 2011    GERD (gastroesophageal reflux disease)     H/O gonorrhea 10/2016    High cholesterol 2015    Intramural and subserous leiomyoma of uterus  "2018    Ischemic stroke 10/2021    Type 2 diabetes mellitus 2014    Uterine fibroid 2008       Allergies   Allergen Reactions    Carvedilol Hives    Hydrochlorothiazide Hives    Losartan Potassium-Hctz Hives    Pneumococcal Vaccine Hives       Past Surgical History:   Procedure Laterality Date    COLONOSCOPY  2022 2022 [facility]    D & C HYSTEROSCOPY  09/18/2018    path was benign    LAPAROSCOPIC APPENDECTOMY  2018    LAPAROSCOPIC TUBAL LIGATION  1988       Family History   Problem Relation Age of Onset    Ovarian cancer Mother     Diabetes Mother     Hypertension Mother     Breast cancer Neg Hx        Social History     Socioeconomic History    Marital status: Single   Tobacco Use    Smoking status: Never    Smokeless tobacco: Never   Vaping Use    Vaping status: Never Used   Substance and Sexual Activity    Alcohol use: Yes     Comment: social    Drug use: Yes     Types: \"Crack\" cocaine, Marijuana    Sexual activity: Yes     Partners: Male     Birth control/protection: Condom, Tubal ligation, Vaginal insert contraception           Objective   Physical Exam  Vitals and nursing note reviewed.   Constitutional:       General: She is not in acute distress.     Appearance: She is well-developed. She is not diaphoretic.   HENT:      Head: Normocephalic and atraumatic.      Nose: Nose normal.   Eyes:      General: No scleral icterus.     Conjunctiva/sclera: Conjunctivae normal.   Cardiovascular:      Rate and Rhythm: Normal rate and regular rhythm.      Heart sounds: Normal heart sounds. No murmur heard.  Pulmonary:      Effort: Pulmonary effort is normal. No respiratory distress.      Breath sounds: Normal breath sounds.   Abdominal:      General: Bowel sounds are normal.      Palpations: Abdomen is soft.      Tenderness: There is no abdominal tenderness.   Musculoskeletal:         General: Normal range of motion.      Cervical back: Normal range of motion and neck supple.   Skin:     General: Skin is warm and dry. "   Neurological:      Mental Status: She is alert and oriented to person, place, and time.   Psychiatric:         Behavior: Behavior normal.         Procedures           ED Course  ED Course as of 01/20/25 1309   Mon Jan 20, 2025   1308 Chest x-ray revealed a pleural-based small mass in the left apex of the lung.  Unchanged from 2021.  We discussed this that she needs an outpatient CT of the chest just to be done through her primary care doctor. [DEANNA]      ED Course User Index  [DEANNA] Faraz Haynes PA                                                       Medical Decision Making  Problems Addressed:  Mass of upper lobe of left lung: complicated acute illness or injury  Pneumonitis: complicated acute illness or injury    Amount and/or Complexity of Data Reviewed  Radiology: ordered.    Risk  Prescription drug management.        Final diagnoses:   Pneumonitis   Mass of upper lobe of left lung       ED Disposition  ED Disposition       ED Disposition   Discharge    Condition   Stable    Comment   --               Ela Brown, PA-C  [address]  [place] [address]  [phone]      for out patient CT evaluate mass left lung since 2021         Medication List        New Prescriptions      doxycycline 100 MG capsule  Commonly known as: MONODOX  Take 1 capsule by mouth 2 (Two) Times a Day.     promethazine-dextromethorphan 6.25-15 MG/5ML syrup  Commonly known as: PROMETHAZINE-DM  Take 5 mL by mouth 4 (Four) Times a Day As Needed for Cough.               Where to Get Your Medications        These medications were sent to [facility] - [place], [place] - [address] - [phone]  - [phone]   [address] [address]      Phone: [phone]   doxycycline 100 MG capsule  promethazine-dextromethorphan 6.25-15 MG/5ML syrup            Faraz Haynes PA  01/22/25 3143

## 2025-01-23 ENCOUNTER — PATIENT OUTREACH (OUTPATIENT)
Dept: OTHER | Facility: HOSPITAL | Age: 60
End: 2025-01-23
Payer: MEDICAID

## 2025-01-26 DIAGNOSIS — E11.65 TYPE 2 DIABETES MELLITUS WITH HYPERGLYCEMIA, WITHOUT LONG-TERM CURRENT USE OF INSULIN: Chronic | ICD-10-CM

## 2025-01-27 ENCOUNTER — TELEPHONE (OUTPATIENT)
Dept: FAMILY MEDICINE CLINIC | Facility: CLINIC | Age: 60
End: 2025-01-27
Payer: MEDICAID

## 2025-01-27 DIAGNOSIS — E11.65 TYPE 2 DIABETES MELLITUS WITH HYPERGLYCEMIA, WITHOUT LONG-TERM CURRENT USE OF INSULIN: Chronic | ICD-10-CM

## 2025-01-27 NOTE — TELEPHONE ENCOUNTER
Caller: Shital Guzmán    Relationship: Self    Best call back number: [phone]    Requested Prescriptions:   Requested Prescriptions     Pending Prescriptions Disp Refills    empagliflozin (Jardiance) 25 MG tablet tablet 30 tablet 0     Sig: Take 1 tablet by mouth Daily. NEEDS SOONER APPT    YEAST INFECTION MEDICATION   VAGINAL BACTERIA MEDICATION     Pharmacy where request should be sent: [address] [phone] [facility] [phone]      Last office visit with prescribing clinician: 5/31/2024   Last telemedicine visit with prescribing clinician: Visit date not found   Next office visit with prescribing clinician: Visit date not found     Additional details provided by patient: THE PATIENT IS OUT OF JARDIENCE AND NEEDS MEDICATION FOR YEAST INFECTION AND VAGINAL BACTERIA     Does the patient have less than a 3 day supply:  [x] Yes  [] No    Would you like a call back once the refill request has been completed: [] Yes [x] No    If the office needs to give you a call back, can they leave a voicemail: [] Yes [x] No    Juan J Oh Rep   01/27/25 10:22 EST

## 2025-01-27 NOTE — TELEPHONE ENCOUNTER
Caller: Shital Guzmán    Relationship: Self    Best call back number: [phone]    What orders are you requesting (i.e. lab or imaging): CT SCAN OF LUNGS       Additional notes: THE PATIENT STATES THAT SHE WENT TO [facility] BECAUSE SHE WAS COUGHING BLOOD THE PATIENT STATES THAT SHE WAS TOLD THAT SHE HAS A MASS IN HER LUNG THE PATIENT WAS TOLD TO HAVE ANOTHER CT SCAN DONE TO CHECK ON THE MASS IN HER LUNGS PLEASE CALL PATIENT TO HELP HER SCHEDULE THAT

## 2025-01-27 NOTE — TELEPHONE ENCOUNTER
Rx Refill Note  Requested Prescriptions     Pending Prescriptions Disp Refills    empagliflozin (Jardiance) 25 MG tablet tablet [Pharmacy Med Name: Jardiance 25 MG Oral Tablet] 60 tablet 0     Sig: TAKE 1 TABLET BY MOUTH ONCE DAILY . APPOINTMENT REQUIRED FOR FUTURE REFILLS      Last office visit with prescribing clinician: 8/30/2023     Next office visit with prescribing clinician: 3/17/2025     Nori Lin MA  01/27/25, 09:09 EST

## 2025-01-28 NOTE — TELEPHONE ENCOUNTER
Pt was seen at ED on 01/20          Chest x-ray revealed a pleural-based small mass in the left apex of the lung.  Unchanged from 2021.  We discussed this that she needs an outpatient CT of the chest just to be done through her primary care doctor. [DEANNA]     Josue Murray MD

## 2025-01-28 NOTE — TELEPHONE ENCOUNTER
Patient will have to see me in the office for me to get proper documentation to be able to order this.  Patient must schedule an in person appointment with me in the office for this.

## 2025-01-28 NOTE — TELEPHONE ENCOUNTER
PATIENT IS CALLING TO CHECK ON THE CT SCAN SHE HAS REQUESTED    PLEASE CALL [phone] (Mobile)

## 2025-01-29 ENCOUNTER — TELEPHONE (OUTPATIENT)
Dept: FAMILY MEDICINE CLINIC | Facility: CLINIC | Age: 60
End: 2025-01-29
Payer: MEDICAID

## 2025-01-29 NOTE — TELEPHONE ENCOUNTER
Hub staff attempted to follow warm transfer process and was unsuccessful     Caller: Shital Guzmán    Relationship to patient: Self    Best call back number: [phone]    Patient is needing: PATIENT WAS RETURNING TELEPHONE CALL TO [facility] HAUSER.

## 2025-02-07 ENCOUNTER — OFFICE VISIT (OUTPATIENT)
Dept: FAMILY MEDICINE CLINIC | Facility: CLINIC | Age: 60
End: 2025-02-07
Payer: MEDICAID

## 2025-02-07 VITALS
HEIGHT: 60 IN | HEART RATE: 97 BPM | SYSTOLIC BLOOD PRESSURE: 164 MMHG | WEIGHT: 172 LBS | BODY MASS INDEX: 33.77 KG/M2 | OXYGEN SATURATION: 97 % | DIASTOLIC BLOOD PRESSURE: 110 MMHG | TEMPERATURE: 98.1 F

## 2025-02-07 DIAGNOSIS — M25.561 CHRONIC PAIN OF BOTH KNEES: ICD-10-CM

## 2025-02-07 DIAGNOSIS — G89.29 CHRONIC MIDLINE LOW BACK PAIN, UNSPECIFIED WHETHER SCIATICA PRESENT: ICD-10-CM

## 2025-02-07 DIAGNOSIS — I10 ACCELERATED ESSENTIAL HYPERTENSION: ICD-10-CM

## 2025-02-07 DIAGNOSIS — E11.65 UNCONTROLLED TYPE 2 DIABETES MELLITUS WITH HYPERGLYCEMIA: Primary | ICD-10-CM

## 2025-02-07 DIAGNOSIS — R91.8 LUNG MASS: ICD-10-CM

## 2025-02-07 DIAGNOSIS — M54.50 CHRONIC MIDLINE LOW BACK PAIN, UNSPECIFIED WHETHER SCIATICA PRESENT: ICD-10-CM

## 2025-02-07 DIAGNOSIS — G89.29 CHRONIC PAIN OF BOTH KNEES: ICD-10-CM

## 2025-02-07 DIAGNOSIS — M25.562 CHRONIC PAIN OF BOTH KNEES: ICD-10-CM

## 2025-02-07 DIAGNOSIS — M25.552 CHRONIC PAIN OF BOTH HIPS: ICD-10-CM

## 2025-02-07 DIAGNOSIS — I10 ESSENTIAL HYPERTENSION: ICD-10-CM

## 2025-02-07 DIAGNOSIS — M25.551 CHRONIC PAIN OF BOTH HIPS: ICD-10-CM

## 2025-02-07 DIAGNOSIS — G89.29 CHRONIC PAIN OF BOTH HIPS: ICD-10-CM

## 2025-02-07 DIAGNOSIS — E11.65 TYPE 2 DIABETES MELLITUS WITH HYPERGLYCEMIA, WITHOUT LONG-TERM CURRENT USE OF INSULIN: Chronic | ICD-10-CM

## 2025-02-07 LAB
EXPIRATION DATE: ABNORMAL
HBA1C MFR BLD: 8.8 % (ref 4.5–5.7)
Lab: ABNORMAL

## 2025-02-07 RX ORDER — LOSARTAN POTASSIUM 100 MG/1
100 TABLET ORAL DAILY
Qty: 90 TABLET | Refills: 0 | Status: SHIPPED | OUTPATIENT
Start: 2025-02-07

## 2025-02-07 RX ORDER — AMLODIPINE BESYLATE 10 MG/1
10 TABLET ORAL DAILY
Qty: 90 TABLET | Refills: 0 | Status: SHIPPED | OUTPATIENT
Start: 2025-02-07

## 2025-02-07 RX ORDER — SEMAGLUTIDE 0.68 MG/ML
INJECTION, SOLUTION SUBCUTANEOUS
Qty: 9 ML | Refills: 0 | Status: SHIPPED | OUTPATIENT
Start: 2025-02-07

## 2025-02-07 RX ORDER — ALBUTEROL SULFATE 90 UG/1
2 INHALANT RESPIRATORY (INHALATION) EVERY 4 HOURS PRN
Qty: 16 G | Refills: 1 | Status: SHIPPED | OUTPATIENT
Start: 2025-02-07

## 2025-02-07 NOTE — PROGRESS NOTES
Subjective   Shital Guzmán is a 59 y.o. female  Mass (Recommended by  ED to have CT for pleural-based small mass in the left apex of the lung) and Diabetes      History of Present Illness  History of Present Illness  I have personally reviewed her radiologist report on her chest x-ray from the emergency department at [facility] on January 20, 2025 showing stable probable pleural based mass in the left apex since 2021 favored benign and radiologist recommended a nonemergent finding can be assessed by outpatient chest CT      The patient is a 59-year-old female who is coming in today for follow-up on her diabetes and hypertension, as well as an abnormal finding on her chest x-ray showing a nodule that was recommended to have a CT scan for further evaluation.    The following portions of the patient's history were reviewed and updated as appropriate: allergies, current medications, past social history and problem list    Review of Systems   Constitutional:  Positive for activity change and fatigue. Negative for chills and fever.   HENT:  Positive for congestion and dental problem.    Respiratory:  Positive for cough and wheezing. Negative for chest tightness and shortness of breath.    Cardiovascular:  Negative for chest pain.   Gastrointestinal:  Negative for nausea.   Musculoskeletal:  Positive for arthralgias, back pain and myalgias. Negative for gait problem and joint swelling.   Skin: Negative.    Neurological:  Positive for weakness and numbness. Negative for dizziness.       Objective     Vitals:    02/07/25 1603   BP: (!) 164/110   Pulse: 97   Temp: 98.1 °F (36.7 °C)   SpO2: 97%       Physical Exam  Vitals and nursing note reviewed.   Constitutional:       General: She is not in acute distress.     Appearance: Normal appearance. She is well-developed. She is obese. She is not ill-appearing, toxic-appearing or diaphoretic.      Comments: [id number] noted     HENT:      Head: Normocephalic and  atraumatic.   Neck:      Thyroid: No thyromegaly.   Cardiovascular:      Rate and Rhythm: Normal rate and regular rhythm.      Heart sounds: Normal heart sounds. No murmur heard.  Pulmonary:      Effort: Pulmonary effort is normal. No respiratory distress.      Breath sounds: Normal breath sounds.   Abdominal:      Palpations: Abdomen is soft. There is no mass.      Tenderness: There is no abdominal tenderness.   Musculoskeletal:         General: Normal range of motion.   Neurological:      Mental Status: She is alert and oriented to person, place, and time.      Motor: No weakness.      Gait: Gait normal.   Psychiatric:         Mood and Affect: Mood normal.         Behavior: Behavior normal.         Thought Content: Thought content normal.         Judgment: Judgment normal.       Physical Exam  Wheezing noted in the lungs.    Assessment & Plan   Assessment & Plan  1. Potential asthma.  Her symptoms, including wheezing and coughing, may be indicative of developing asthma, particularly given her history of smoking and exposure to secondhand smoke. The presence of mold in her environment could also be a contributing factor. A CT scan with IV contrast will be ordered to further evaluate the mass identified on her chest x-ray and to assess for potential emphysema. An inhaler will be prescribed to aid in her breathing.    2. Diabetes mellitus.  Her blood glucose levels are elevated. Prescriptions for Jardiance and Ozempic will be renewed for a 90-day period. She is advised to maintain her scheduled follow-up appointment with endocrinology in March 2025.    3. Hypertension.  Her blood pressure readings are significantly high. A 90-day refill of her antihypertensive medications will be provided.    4. Knee, hip, and back pain.  She reports knee instability and difficulty standing up. She also experiences hip pain and numbness in her arms, which she believes are residual effects from a stroke she suffered four years ago.  She is under the care of Dr. Chu for her cardiac health. She also reports tingling in her feet, which she attributes to her diabetes. She has been experiencing persistent numbness on the left side of her body for the past four years and is seeking medication to manage this symptom. A referral to physical therapy will be made to address her back, hip, and knee issues.    Follow-up  The patient will follow up in 1 month.    Diagnoses and all orders for this visit:    1. Uncontrolled type 2 diabetes mellitus with hyperglycemia (Primary)  -     POC Glycosylated Hemoglobin (Hb A1C)    2. Essential hypertension    3. Type 2 diabetes mellitus with hyperglycemia, without long-term current use of insulin  -     empagliflozin (Jardiance) 25 MG tablet tablet; Take 1 tablet by mouth Daily.  Dispense: 90 tablet; Refill: 0  -     Semaglutide,0.25 or 0.5MG/DOS, (Ozempic, 0.25 or 0.5 MG/DOSE,) 2 MG/3ML solution pen-injector; Start with 0.25 mg weekly for 4 weeks then increase to 0.5 mg weekly  Dispense: 9 mL; Refill: 0    4. Accelerated essential hypertension  -     amLODIPine (NORVASC) 10 MG tablet; Take 1 tablet by mouth Daily.  Dispense: 90 tablet; Refill: 0    5. Chronic pain of both hips  -     Ambulatory Referral to Physical Therapy for Evaluation & Treatment    6. Chronic midline low back pain, unspecified whether sciatica present  -     Ambulatory Referral to Physical Therapy for Evaluation & Treatment    7. Chronic pain of both knees  -     Ambulatory Referral to Physical Therapy for Evaluation & Treatment    8. Lung mass  -     CT Chest With Contrast Diagnostic; Future    Other orders  -     albuterol sulfate  (90 Base) MCG/ACT inhaler; Inhale 2 puffs Every 4 (Four) Hours As Needed for Wheezing or Shortness of Air.  Dispense: 16 g; Refill: 1  -     losartan (COZAAR) 100 MG tablet; Take 1 tablet by mouth Daily.  Dispense: 90 tablet; Refill: 0         Patient or patient representative verbalized consent for the use  of Ambient Listening during the visit with  Ela Brown PA-C for chart documentation. 2/7/2025  17:06 EST

## 2025-02-10 ENCOUNTER — TELEPHONE (OUTPATIENT)
Dept: FAMILY MEDICINE CLINIC | Facility: CLINIC | Age: 60
End: 2025-02-10
Payer: MEDICAID

## 2025-02-10 NOTE — TELEPHONE ENCOUNTER
PATIENT CALLED SAID THAT SHE HAD AN APPOINTMENT WITH [facility] ON FRIDAY 2/7/25 AND DISCUSSED HIGH BLOOD SUGAR AND INSULIN, WHICH WAS SENT TO [facility], SHE SAID THEY TOLD HER THEY WON'T FILL IT BECAUSE THEY NEED A PA.    PATIENT WANTED TO KNOW THE STATUS OF THE PA.    PLEASE LET HER KNOW.     THANK YOU

## 2025-03-01 ENCOUNTER — HOSPITAL ENCOUNTER (OUTPATIENT)
Facility: HOSPITAL | Age: 60
Discharge: HOME | End: 2025-03-01
Payer: MEDICAID

## 2025-03-01 DIAGNOSIS — R91.8 LUNG MASS: ICD-10-CM

## 2025-03-01 PROCEDURE — 71260 CT THORAX DX C+: CPT

## 2025-03-01 PROCEDURE — 25510000001 IOPAMIDOL 61 % SOLUTION: Performed by: PHYSICIAN ASSISTANT

## 2025-03-01 RX ORDER — IOPAMIDOL 612 MG/ML
100 INJECTION, SOLUTION INTRAVASCULAR
Status: COMPLETED | OUTPATIENT
Start: 2025-03-01 | End: 2025-03-01

## 2025-03-01 RX ADMIN — IOPAMIDOL 75 ML: 612 INJECTION, SOLUTION INTRAVENOUS at 14:03

## 2025-03-05 DIAGNOSIS — K76.0 FATTY LIVER: Primary | ICD-10-CM

## 2025-03-05 DIAGNOSIS — I25.10 CORONARY ARTERY DISEASE INVOLVING NATIVE HEART, UNSPECIFIED VESSEL OR LESION TYPE, UNSPECIFIED WHETHER ANGINA PRESENT: ICD-10-CM

## 2025-03-19 ENCOUNTER — OFFICE VISIT (OUTPATIENT)
Dept: CARDIOLOGY | Facility: CLINIC | Age: 60
End: 2025-03-19
Payer: MEDICAID

## 2025-03-19 VITALS
SYSTOLIC BLOOD PRESSURE: 130 MMHG | BODY MASS INDEX: 31.65 KG/M2 | WEIGHT: 161.2 LBS | HEART RATE: 93 BPM | HEIGHT: 60 IN | OXYGEN SATURATION: 98 % | DIASTOLIC BLOOD PRESSURE: 90 MMHG

## 2025-03-19 DIAGNOSIS — I10 ESSENTIAL HYPERTENSION: ICD-10-CM

## 2025-03-19 DIAGNOSIS — E78.2 MIXED HYPERLIPIDEMIA: Primary | ICD-10-CM

## 2025-03-19 DIAGNOSIS — R06.09 DYSPNEA ON EXERTION: ICD-10-CM

## 2025-03-19 DIAGNOSIS — I35.0 AORTIC STENOSIS, MILD: ICD-10-CM

## 2025-03-19 PROCEDURE — 3080F DIAST BP >= 90 MM HG: CPT | Performed by: INTERNAL MEDICINE

## 2025-03-19 PROCEDURE — 3075F SYST BP GE 130 - 139MM HG: CPT | Performed by: INTERNAL MEDICINE

## 2025-03-19 PROCEDURE — 99214 OFFICE O/P EST MOD 30 MIN: CPT | Performed by: INTERNAL MEDICINE

## 2025-03-19 RX ORDER — PREDNISOLONE ACETATE 10 MG/ML
SUSPENSION/ DROPS OPHTHALMIC
COMMUNITY
Start: 2025-03-10 | End: 2025-03-19

## 2025-03-19 RX ORDER — KETOROLAC TROMETHAMINE 4 MG/ML
SOLUTION/ DROPS OPHTHALMIC
COMMUNITY
Start: 2025-03-10 | End: 2025-03-19

## 2025-03-19 RX ORDER — OFLOXACIN 3 MG/ML
SOLUTION/ DROPS OPHTHALMIC
COMMUNITY
Start: 2025-03-10 | End: 2025-03-19

## 2025-03-19 NOTE — PROGRESS NOTES
[facility] Cardiology  Office visit  Shital Guzmán  1965  [phone]  There is no work phone number on file.    VISIT DATE:  3/19/2025    PCP: Ela Brown PA-C  [address]  [facility] [address]    CC:  Chief Complaint   Patient presents with    Aortic stenosis, mild     F/U     PROBLEM LIST:   Acute right hemispheric CVA October 2021  Presentation with left sided paraesthesias  MRI brain 10/21/21: scattered small acute infarcts in right thalamus, right corona radiata/pericallosal white matter; chronic right basal ganglia lacunar infarct   MRA head and neck essentially normal   Echo 10/22/21: EF 56-60%, negative saline test, mild AS, mild LVH  Hypertension   Hyperlipidemia  DM2  Medical noncompliance  Cocaine use    March 2025 CT chest  Moderate to severe calcified coronary atherosclerotic disease    ASSESSMENT:   Diagnosis Plan   1. Mixed hyperlipidemia        2. Essential hypertension        3. Aortic stenosis, mild        4. Dyspnea on exertion  Treadmill Stress Test          PLAN:  Aortic stenosis, mild: Stable exam.  Continue clinical follow-up with echocardiographic surveillance every 3 to 5 years.  Echo pending to reassess.    Hypertension: Goal less than 130/80 mmHg.  Well-controlled, continue current medical therapy.    Hyperlipidemia: Goal LDL less than 70.  Continue current medical therapy.  Lipid profile pending.    Coronary calcifications: Dyspnea on exertion, atypical chest pain.  Recommending exercise treadmill test for ischemia evaluation.  Continue aspirin, statin and afterload reduction.    Medication adherence issues.    Subjective  Interval assessment: History of persistent intermittent cocaine use.  Residual left-sided peripheral nerve discomfort and paresthesias which are chronic related to history of stroke.  Still sporadically taking medical therapy.  Has not obtained follow-up labs as directed by primary care provider.  Shortness of  "breath, class II pattern.  Intermittent wheezing.  Intermittent odynophagia.  Upcoming GI evaluation pending.    Initial evaluation: Mrs. Guzmán is a 57 y/o female with above noted history whom we are seeing in consultation for acute stroke. She has a history of hypertension, hyperlipidemia and diabetes, however stopped taking all her medicine several  months ago as she thought she \"did not need it\" after losing some weight. She has since gained most of her weight back, however never went back on her medicines.  She presented to the hospital yesterday with a 1 day history of left sided paraesthesias. She reports numbness and tingling along her left arm and left side of face and head. She denies any other focal neurological deficits. MRI brain revealed small acute scattered infarcts in right thalamus, right corona radiata and pericallosal white matter as well as old right basal ganglia lacunar infarct. Her BP on arrival was 170s/100 mmHg, her glucose was 435, and her LDL is 199. She also reports doing cocaine occasionally, with last use about a month ago. No tobacco or alcohol use. She denies prior history of MI, CVA, DVT or PE. She denies any chest pain or unusual dyspnea. She has occasional brief palpitations which last only a few seconds. She has been fully vaccinated for Covid.      PHYSICAL EXAMINATION:  Vitals:    03/19/25 1341   BP: 130/90   BP Location: Left arm   Patient Position: Sitting   Cuff Size: Adult   Pulse: 93   SpO2: 98%   Weight: 73.1 kg (161 lb 3.2 oz)   Height: 152.4 cm (60\")     General Appearance:    Alert, cooperative, no distress, appears stated age   Head:    Normocephalic, without obvious abnormality, atraumatic   Eyes:    conjunctiva/corneas clear   Nose:   Nares normal, septum midline, mucosa normal, no drainage   Throat:   Lips, teeth and gums normal   Neck:   Supple, symmetrical, trachea midline, no carotid    bruit or JVD   Lungs:     Clear to auscultation bilaterally, respirations " unlabored   Chest Wall:    No tenderness or deformity    Heart:    Regular rate and rhythm, S1 and S2 normal, 3 of 6 early peaking systolic murmur, right upper sternal border, no rub   or gallop, normal carotid impulse bilaterally without bruit.   Abdomen:     Soft, non-tender   Extremities:   Extremities normal, atraumatic, no cyanosis or edema   Pulses:   2+ and symmetric all extremities   Skin:   Skin color, texture, turgor normal, no rashes or lesions       Diagnostic Data:  Procedures  Lab Results   Component Value Date    CHLPL 231 (H) 05/18/2023    TRIG 244 (H) 05/18/2023    HDL 46 05/18/2023     Lab Results   Component Value Date    GLUCOSE 174 (H) 05/18/2023    BUN 14 05/18/2023    CREATININE 0.89 05/18/2023     (H) 05/18/2023    K 3.7 05/18/2023     05/18/2023    CO2 29.2 (H) 05/18/2023     Lab Results   Component Value Date    HGBA1C 8.8 (A) 02/07/2025     Lab Results   Component Value Date    WBC 10.81 (H) 05/18/2023    HGB 16.2 (H) 05/18/2023    HCT 47.7 (H) 05/18/2023     05/18/2023       Allergies  Allergies   Allergen Reactions    Carvedilol Hives     Coreg    Hydrochlorothiazide Hives    Losartan Potassium-Hctz Hives    Pneumococcal Vaccine Hives       Current Medications    Current Outpatient Medications:     albuterol sulfate  (90 Base) MCG/ACT inhaler, Inhale 2 puffs Every 4 (Four) Hours As Needed for Wheezing or Shortness of Air., Disp: 16 g, Rfl: 1    amLODIPine (NORVASC) 10 MG tablet, Take 1 tablet by mouth Daily., Disp: 90 tablet, Rfl: 0    atorvastatin (LIPITOR) 80 MG tablet, Take 1 tablet by mouth Daily., Disp: 90 tablet, Rfl: 3    Blood Glucose Monitoring Suppl (ONE TOUCH ULTRA 2) w/Device kit, USE TO CHECK GLUCOSE ONCE DAILY, Disp: , Rfl:     Cariprazine HCl (Vraylar) 1.5 MG capsule capsule, Take 1 capsule by mouth Daily., Disp: 30 capsule, Rfl: 0    empagliflozin (Jardiance) 25 MG tablet tablet, Take 1 tablet by mouth Daily., Disp: 90 tablet, Rfl: 0     EPINEPHrine (EPIPEN) 0.3 MG/0.3ML solution auto-injector injection, Inject 0.3 mL under the skin into the appropriate area as directed 1 (One) Time As Needed (anaphylaxis) for up to 1 dose., Disp: 1 each, Rfl: 0    EQ Aspirin Low Dose 81 MG chewable tablet, CHEW AND SWALLOW 1 TABLET BY MOUTH ONCE DAILY, Disp: 90 tablet, Rfl: 0    glucose blood test strip, Check sugar once a day, Disp: 30 each, Rfl: 12    Lancets 30G misc, Check sugar daily, Disp: 30 each, Rfl: 12    levocetirizine (XYZAL) 5 MG tablet, Take 1 tablet by mouth Every Evening. For hives, Disp: 30 tablet, Rfl: 11    losartan (COZAAR) 100 MG tablet, Take 1 tablet by mouth Daily., Disp: 90 tablet, Rfl: 0    Semaglutide,0.25 or 0.5MG/DOS, (Ozempic, 0.25 or 0.5 MG/DOSE,) 2 MG/3ML solution pen-injector, Start with 0.25 mg weekly for 4 weeks then increase to 0.5 mg weekly, Disp: 9 mL, Rfl: 0          ROS  ROS      SOCIAL HX  Social History     Socioeconomic History    Marital status: Single   Tobacco Use    Smoking status: Former     Current packs/day: 0.00     Average packs/day: 3.0 packs/day for 15.0 years (45.0 ttl pk-yrs)     Types: Cigarettes     Start date:      Quit date:      Years since quittin.2     Passive exposure: Current    Smokeless tobacco: Never   Vaping Use    Vaping status: Never Used    Passive vaping exposure: Yes   Substance and Sexual Activity    Alcohol use: Yes     Comment: social    Drug use: Yes     Types: Marijuana, Cocaine(coke)     Comment: Occasional (Cocaine with friends)    Sexual activity: Yes     Partners: Female, Male     Birth control/protection: Condom, Tubal ligation, Vaginal insert contraception       FAMILY HX  Family History   Problem Relation Age of Onset    Ovarian cancer Mother     Diabetes Mother     Hypertension Mother     Anxiety disorder Mother     Depression Mother     Suicide Attempts Mother     Breast cancer Neg Hx              Faraz Chu III, MD, FACC

## 2025-03-20 ENCOUNTER — OFFICE VISIT (OUTPATIENT)
Dept: GASTROENTEROLOGY | Facility: CLINIC | Age: 60
End: 2025-03-20
Payer: MEDICAID

## 2025-03-20 VITALS
WEIGHT: 162.2 LBS | BODY MASS INDEX: 31.84 KG/M2 | HEIGHT: 60 IN | DIASTOLIC BLOOD PRESSURE: 84 MMHG | HEART RATE: 91 BPM | SYSTOLIC BLOOD PRESSURE: 123 MMHG | TEMPERATURE: 96.9 F

## 2025-03-20 DIAGNOSIS — E11.65 TYPE 2 DIABETES MELLITUS WITH HYPERGLYCEMIA, WITH LONG-TERM CURRENT USE OF INSULIN: ICD-10-CM

## 2025-03-20 DIAGNOSIS — K76.0 HEPATIC STEATOSIS: Primary | ICD-10-CM

## 2025-03-20 DIAGNOSIS — Z79.4 TYPE 2 DIABETES MELLITUS WITH HYPERGLYCEMIA, WITH LONG-TERM CURRENT USE OF INSULIN: ICD-10-CM

## 2025-03-20 DIAGNOSIS — K21.9 GASTROESOPHAGEAL REFLUX DISEASE, UNSPECIFIED WHETHER ESOPHAGITIS PRESENT: ICD-10-CM

## 2025-03-20 DIAGNOSIS — F43.10 PTSD (POST-TRAUMATIC STRESS DISORDER): ICD-10-CM

## 2025-03-20 DIAGNOSIS — F33.2 SEVERE EPISODE OF RECURRENT MAJOR DEPRESSIVE DISORDER, WITHOUT PSYCHOTIC FEATURES: ICD-10-CM

## 2025-03-20 PROCEDURE — 1160F RVW MEDS BY RX/DR IN RCRD: CPT

## 2025-03-20 PROCEDURE — 3074F SYST BP LT 130 MM HG: CPT

## 2025-03-20 PROCEDURE — 99214 OFFICE O/P EST MOD 30 MIN: CPT

## 2025-03-20 PROCEDURE — 3079F DIAST BP 80-89 MM HG: CPT

## 2025-03-20 PROCEDURE — 1159F MED LIST DOCD IN RCRD: CPT

## 2025-03-20 RX ORDER — PANTOPRAZOLE SODIUM 40 MG/1
40 TABLET, DELAYED RELEASE ORAL DAILY
Qty: 90 TABLET | Refills: 3 | Status: SHIPPED | OUTPATIENT
Start: 2025-03-20

## 2025-03-20 RX ORDER — ALCOHOL ANTISEPTIC PADS
PADS, MEDICATED (EA) TOPICAL
Qty: 30 EACH | Refills: 12 | OUTPATIENT
Start: 2025-03-20

## 2025-03-20 RX ORDER — BLOOD-GLUCOSE METER
EACH MISCELLANEOUS
OUTPATIENT
Start: 2025-03-20

## 2025-03-20 RX ORDER — CARIPRAZINE 1.5 MG/1
1.5 CAPSULE, GELATIN COATED ORAL DAILY
Qty: 30 CAPSULE | Refills: 0 | OUTPATIENT
Start: 2025-03-20

## 2025-03-20 NOTE — PROGRESS NOTES
Office Note     Name: Shital Guzmán    : 1965     MRN: [id number]     Chief Complaint  Hepatic Disease    Subjective     History of Present Illness:  History of Present Illness  The patient is a 59-year-old female who presents today for further evaluation and management of fatty liver.    She was referred by her primary care physician due to elevated liver enzymes. She reports intermittent pain in the right upper quadrant, which she describes as a sensation of something being lodged there. She has not sought any treatment for this discomfort and does not use over-the-counter analgesics such as ibuprofen, Aleve, or naproxen. She has no history of alcohol consumption, smoking, hepatitis, or intravenous drug use, but does have remote history of substance abuse and continues to use marijuana.     She recently started Ozempic and has been using home remedies such as turmeric, garlic, eloisa, lemon, and cinnamon to manage her blood sugar levels.    She underwent a colonoscopy three years ago at [facility], during which 3 to 5 polyps were removed. She experiences constipation, with bowel movements occurring every 2 to 3 days, and also reports excessive gas production. She avoids dairy products due to lactose intolerance, which causes diarrhea.    She has a history of appendicitis, for which she underwent surgery in .    She reports difficulty swallowing, a symptom that has been present for some time. She also experiences reflux but does not take any medication for it. She has noticed a worsening of these symptoms, describing a sensation of something being stuck in her throat.    SOCIAL HISTORY  She does not drink alcohol. She does not smoke.    FAMILY HISTORY  She does not have a family history of liver disease, colon cancer, or stomach cancer.    MEDICATIONS  Simvastatin, Jardiance, Ozempic    Past Medical History:   Past Medical History:   Diagnosis Date    Anemia     Bipolar disorder     I get  frustrated and get angry at [facility]    Colon polyp     Depression     Essential hypertension     GERD (gastroesophageal reflux disease)     H/O gonorrhea 10/2016    High cholesterol 2015    Intramural and subserous leiomyoma of uterus 2018    Ischemic stroke 10/2021    Panic disorder 2018    When i cant get things done    Psychiatric illness 2018    Depression    PTSD (post-traumatic stress disorder) 10/20/2017    After i got fired at my job    Substance abuse 10/01/2020    Mercedes used cocaine occasinally when im down    Suicide attempt 2018    Took a lot of sleeping pills    Type 2 diabetes mellitus     Uterine fibroid        Past Surgical History:   Past Surgical History:   Procedure Laterality Date    ABDOMINAL SURGERY      Appendix    APPENDECTOMY      I almost  barely made it to [facility]    COLON SURGERY      Polyps    [facility]  [facility]    D & C HYSTEROSCOPY  2018    path was benign    ENDOSCOPY      Had a stroke    GASTRECTOMY      So much issues in my stomach    LAPAROSCOPIC APPENDECTOMY      LAPAROSCOPIC TUBAL LIGATION         Immunizations:   Immunization History   Administered Date(s) Administered    COVID-19 (MODERNA) 1st,2nd,3rd Dose Monovalent 2022    COVID-19 (PFIZER) Purple Cap Monovalent 2021, 2021    Fluzone  >6mos 10/19/2010    Hepatitis A 2019    Pneumococcal Conjugate 20-Valent (PCV20) 2023        Medications:     Current Outpatient Medications:     albuterol sulfate  (90 Base) MCG/ACT inhaler, Inhale 2 puffs Every 4 (Four) Hours As Needed for Wheezing or Shortness of Air., Disp: 16 g, Rfl: 1    amLODIPine (NORVASC) 10 MG tablet, Take 1 tablet by mouth Daily., Disp: 90 tablet, Rfl: 0    atorvastatin (LIPITOR) 80 MG tablet, Take 1 tablet by mouth Daily., Disp: 90 tablet, Rfl: 3    Blood Glucose Monitoring Suppl (ONE TOUCH ULTRA 2) w/Device kit, USE TO CHECK GLUCOSE ONCE DAILY, Disp: , Rfl:      Cariprazine HCl (Vraylar) 1.5 MG capsule capsule, Take 1 capsule by mouth Daily., Disp: 30 capsule, Rfl: 0    empagliflozin (Jardiance) 25 MG tablet tablet, Take 1 tablet by mouth Daily., Disp: 90 tablet, Rfl: 0    EPINEPHrine (EPIPEN) 0.3 MG/0.3ML solution auto-injector injection, Inject 0.3 mL under the skin into the appropriate area as directed 1 (One) Time As Needed (anaphylaxis) for up to 1 dose., Disp: 1 each, Rfl: 0    EQ Aspirin Low Dose 81 MG chewable tablet, CHEW AND SWALLOW 1 TABLET BY MOUTH ONCE DAILY, Disp: 90 tablet, Rfl: 0    glucose blood test strip, Check sugar once a day, Disp: 30 each, Rfl: 12    Lancets 30G misc, Check sugar daily, Disp: 30 each, Rfl: 12    levocetirizine (XYZAL) 5 MG tablet, Take 1 tablet by mouth Every Evening. For hives, Disp: 30 tablet, Rfl: 11    losartan (COZAAR) 100 MG tablet, Take 1 tablet by mouth Daily., Disp: 90 tablet, Rfl: 0    Semaglutide,0.25 or 0.5MG/DOS, (Ozempic, 0.25 or 0.5 MG/DOSE,) 2 MG/3ML solution pen-injector, Start with 0.25 mg weekly for 4 weeks then increase to 0.5 mg weekly, Disp: 9 mL, Rfl: 0    Allergies:   Allergies   Allergen Reactions    Carvedilol Hives     Coreg    Hydrochlorothiazide Hives    Losartan Potassium-Hctz Hives    Pneumococcal Vaccine Hives       Family History:   Family History   Problem Relation Age of Onset    Ovarian cancer Mother     Diabetes Mother     Hypertension Mother     Anxiety disorder Mother     Depression Mother     Suicide Attempts Mother     Breast cancer Neg Hx     Colon cancer Neg Hx        Social History:   Social History     Socioeconomic History    Marital status: Single   Tobacco Use    Smoking status: Former     Current packs/day: 0.00     Average packs/day: 3.0 packs/day for 15.0 years (45.0 ttl pk-yrs)     Types: Cigarettes     Start date:      Quit date:      Years since quittin.2     Passive exposure: Current    Smokeless tobacco: Never   Vaping Use    Vaping status: Never Used    Passive  "vaping exposure: Yes   Substance and Sexual Activity    Alcohol use: Yes     Comment: social    Drug use: Yes     Types: Marijuana, Cocaine(coke)     Comment: Occasional (Cocaine with friends)    Sexual activity: Yes     Partners: Female, Male     Birth control/protection: Condom, Tubal ligation, Vaginal insert contraception         Objective     Vital Signs  /84 (BP Location: Left arm, Patient Position: Sitting, Cuff Size: Adult)   Pulse 91   Temp 96.9 °F (36.1 °C) (Temporal)   Ht 152.4 cm (60\")   Wt 73.6 kg (162 lb 3.2 oz)   BMI 31.68 kg/m²   Estimated body mass index is 31.68 kg/m² as calculated from the following:    Height as of this encounter: 152.4 cm (60\").    Weight as of this encounter: 73.6 kg (162 lb 3.2 oz).            Physical Exam  Vitals reviewed.   Constitutional:       General: She is awake.   Cardiovascular:      Rate and Rhythm: Normal rate.   Pulmonary:      Effort: Pulmonary effort is normal.   Abdominal:      Palpations: Abdomen is soft.      Tenderness: There is no abdominal tenderness.   Skin:     General: Skin is warm and dry.   Neurological:      Mental Status: She is alert.      Physical Exam      Results  Laboratory Studies  Cholesterol levels are elevated. Liver enzymes were normal in 2023.    Imaging  CT scan of chest showed fatty liver and calcified mediastinal lymph nodes. Esophagus was unremarkable.      Assessment and Plan     Assessment & Plan  Hepatic steatosis    Orders:    Comprehensive Metabolic Panel    CBC & Differential    SOUSA Fibrosure Plus; Future    Gastroesophageal reflux disease, unspecified whether esophagitis present    Orders:    pantoprazole (PROTONIX) 40 MG EC tablet; Take 1 tablet by mouth Daily.       Assessment & Plan  1. Fatty liver.  Her CT scan revealed a fatty liver, likely due to obesity, high cholesterol, and poorly controlled diabetes. Liver enzymes were normal in 2023. A FibroSure panel will be ordered to assess for fibrosis or stiffening. " She is advised to maintain a healthy diet and exercise regularly. If the FibroSure panel indicates fibrosis, further management will be discussed.    2. Diabetes Mellitus.  Her diabetes is not well controlled, which may contribute to her fatty liver. She is currently on Jardiance and Ozempic. She is advised to continue her current medications and follow up with her primary care provider for diabetes management.    3. Hypercholesterolemia.  Her cholesterol levels are elevated, contributing to her fatty liver. She is currently taking simvastatin daily. She is advised to continue this medication and follow up with her primary care provider for cholesterol management.    4. Constipation.  She reports significant constipation, having bowel movements every 2-3 days. This may also contribute to her abdominal pain. She is advised to take MiraLAX daily, starting with one capful and adjusting the dose as needed. FiberCon is recommended if she is unable to consume 25-30 g of fiber daily.    5. Gastroesophageal Reflux Disease (GERD).  She reports symptoms of reflux and a sensation of something stuck in her throat. She is advised to start Protonix 40 mg daily for GERD, which may improve her difficulty swallowing. If not, will plan for EGD and dilation.     PROCEDURE  Colonoscopy three years ago at [facility] revealed 3 to 5 polyps, which were removed.      Follow Up  3-6 months    Patient or patient representative verbalized consent for the use of Ambient Listening during the visit with  ELDON Fine for chart documentation. 3/20/2025  11:16 EDT    ELDON Fine  [facility] [address] [place] [address] GASTROENTEROLOGY  [address] [place] [id number]

## 2025-03-20 NOTE — PATIENT INSTRUCTIONS
Recommend bowel cleanse with Miralax, which is over the counter. Dissolve 5 capfuls into 32 ounces of sugar-free gatorade/powerade and drink 8 ounces every 30 minutes until gone. Follow this with regular use of Miralax, titrating to achieve/maintain soft/formed bowel movements. Miralax is safe to use long-term. You can also repeat the bowel cleanse as needed.   Recommend 25-30 grams of fiber daily. May use Fibercon tablets to supplement as needed. Fibercon is less likely to cause gas/bloating.   Drink 64-80 ounces of water daily, with ideally half of that containing electrolytes (sugar free gatorade/powerade, electrolyte tablets)  Exercise is helpful for maintaining healthy bowel habits.    For heartburn/reflux  Follow a diet as recommended by your health care provider. This may involve avoiding foods and drinks such as:  Coffee and tea (with or without caffeine).  Drinks that contain alcohol.  Energy drinks and sports drinks.  Carbonated drinks or sodas.  Chocolate and cocoa.  Peppermint and mint flavorings.  Garlic and onions.  Horseradish.  Spicy and acidic foods, including peppers, chili powder, castillo powder, vinegar, hot sauces, and barbecue sauce.  Citrus fruit juices and citrus fruits, such as oranges, fredy, and limes.  Tomato-based foods, such as red sauce, chili, salsa, and pizza with red sauce.  Fried and fatty foods, such as donuts, french fries, potato chips, and high-fat dressings.    Eat small, frequent meals instead of large meals.  Avoid drinking large amounts of liquid with your meals.  Avoid eating meals during the 2-3 hours before bedtime.  Avoid lying down right after you eat.

## 2025-03-20 NOTE — TELEPHONE ENCOUNTER
Caller: Shital Guzmán    Relationship: Self    Best call back number: [phone]     Requested Prescriptions:   Requested Prescriptions     Pending Prescriptions Disp Refills    Blood Glucose Monitoring Suppl (ONE TOUCH ULTRA 2) w/Device kit      glucose blood test strip 30 each 12     Sig: Check sugar once a day    Lancets 30G misc 30 each 12     Sig: Check sugar daily        Pharmacy where request should be sent: [address] [phone] [facility] [phone]      Last office visit with prescribing clinician: 2/7/2025   Last telemedicine visit with prescribing clinician: Visit date not found   Next office visit with prescribing clinician: Visit date not found     Does the patient have less than a 3 day supply:  [x] Yes  [] No    Would you like a call back once the refill request has been completed: [] Yes [x] No    If the office needs to give you a call back, can they leave a voicemail: [] Yes [x] No    Juan J Olson Rep   03/20/25 15:14 EDT

## 2025-03-26 ENCOUNTER — RESULTS FOLLOW-UP (OUTPATIENT)
Dept: CARDIOLOGY | Facility: CLINIC | Age: 60
End: 2025-03-26

## 2025-03-26 ENCOUNTER — HOSPITAL ENCOUNTER (OUTPATIENT)
Dept: CARDIOLOGY | Facility: HOSPITAL | Age: 60
Discharge: HOME | End: 2025-03-26
Admitting: INTERNAL MEDICINE
Payer: MEDICAID

## 2025-03-26 VITALS — SYSTOLIC BLOOD PRESSURE: 126 MMHG | HEART RATE: 106 BPM | DIASTOLIC BLOOD PRESSURE: 88 MMHG | OXYGEN SATURATION: 97 %

## 2025-03-26 LAB
BH CV STRESS BP STAGE 1: NORMAL
BH CV STRESS BP STAGE 2: NORMAL
BH CV STRESS BP STAGE 3: NORMAL
BH CV STRESS DURATION MIN STAGE 1: 3
BH CV STRESS DURATION MIN STAGE 2: 3
BH CV STRESS DURATION MIN STAGE 3: 0
BH CV STRESS DURATION SEC STAGE 1: 0
BH CV STRESS DURATION SEC STAGE 2: 0
BH CV STRESS DURATION SEC STAGE 3: 59
BH CV STRESS GRADE STAGE 1: 10
BH CV STRESS GRADE STAGE 2: 12
BH CV STRESS GRADE STAGE 3: 14
BH CV STRESS HR STAGE 1: 126
BH CV STRESS HR STAGE 2: 146
BH CV STRESS HR STAGE 3: 160
BH CV STRESS METS STAGE 1: 5
BH CV STRESS METS STAGE 2: 7.5
BH CV STRESS METS STAGE 3: 10
BH CV STRESS O2 STAGE 1: 95
BH CV STRESS O2 STAGE 2: 96
BH CV STRESS O2 STAGE 3: 95
BH CV STRESS PROTOCOL 1: NORMAL
BH CV STRESS RECOVERY BP: NORMAL MMHG
BH CV STRESS RECOVERY HR: 117 BPM
BH CV STRESS RECOVERY O2: 97 %
BH CV STRESS SPEED STAGE 1: 1.7
BH CV STRESS SPEED STAGE 2: 2.5
BH CV STRESS SPEED STAGE 3: 3.4
BH CV STRESS STAGE 1: 1
BH CV STRESS STAGE 2: 2
BH CV STRESS STAGE 3: 3
MAXIMAL PREDICTED HEART RATE: 161 BPM
PERCENT MAX PREDICTED HR: 99.38 %
STRESS BASELINE BP: NORMAL MMHG
STRESS BASELINE HR: 106 BPM
STRESS O2 SAT REST: 97 %
STRESS PERCENT HR: 117 %
STRESS POST ESTIMATED WORKLOAD: 8.4 METS
STRESS POST EXERCISE DUR MIN: 6 MIN
STRESS POST EXERCISE DUR SEC: 59 SEC
STRESS POST O2 SAT PEAK: 95 %
STRESS POST PEAK BP: NORMAL MMHG
STRESS POST PEAK HR: 160 BPM
STRESS TARGET HR: 137 BPM

## 2025-03-26 PROCEDURE — 93017 CV STRESS TEST TRACING ONLY: CPT

## 2025-03-27 ENCOUNTER — HOSPITAL ENCOUNTER (OUTPATIENT)
Dept: CARDIOLOGY | Facility: HOSPITAL | Age: 60
Discharge: HOME | End: 2025-03-27
Admitting: INTERNAL MEDICINE
Payer: MEDICAID

## 2025-03-27 VITALS
DIASTOLIC BLOOD PRESSURE: 90 MMHG | HEIGHT: 60 IN | SYSTOLIC BLOOD PRESSURE: 126 MMHG | BODY MASS INDEX: 31.86 KG/M2 | WEIGHT: 162.26 LBS

## 2025-03-27 DIAGNOSIS — I35.0 AORTIC STENOSIS, MILD: ICD-10-CM

## 2025-03-27 PROCEDURE — 93306 TTE W/DOPPLER COMPLETE: CPT

## 2025-03-28 LAB
AORTIC DIMENSIONLESS INDEX: 0.42 (DI)
ASCENDING AORTA: 3.4 CM
AV MEAN PRESS GRAD SYS DOP V1V2: 10 MMHG
AV VMAX SYS DOP: 215.1 CM/SEC
BH CV ECHO MEAS - AO MAX PG: 18 MMHG
BH CV ECHO MEAS - AO ROOT DIAM: 3.1 CM
BH CV ECHO MEAS - AO V2 VTI: 34.4 CM
BH CV ECHO MEAS - AVA(I,D): 1.2 CM2
BH CV ECHO MEAS - IVS/LVPW: 1 CM
BH CV ECHO MEAS - IVSD: 1.1 CM
BH CV ECHO MEAS - LA DIMENSION: 3.7 CM
BH CV ECHO MEAS - LAT PEAK E' VEL: 9.4 CM/SEC
BH CV ECHO MEAS - LV MAX PG: 2.33 MMHG
BH CV ECHO MEAS - LV MEAN PG: 1.24 MMHG
BH CV ECHO MEAS - LV V1 MAX: 76.2 CM/SEC
BH CV ECHO MEAS - LV V1 VTI: 13.6 CM
BH CV ECHO MEAS - LVIDD: 3.5 CM
BH CV ECHO MEAS - LVIDS: 2.3 CM
BH CV ECHO MEAS - LVOT AREA: 3.1 CM2
BH CV ECHO MEAS - LVOT DIAM: 2 CM
BH CV ECHO MEAS - LVPWD: 1.1 CM
BH CV ECHO MEAS - MED PEAK E' VEL: 6.8 CM/SEC
BH CV ECHO MEAS - MV A MAX VEL: 98.1 CM/SEC
BH CV ECHO MEAS - MV DEC SLOPE: 250.2 CM/SEC2
BH CV ECHO MEAS - MV E MAX VEL: 58.4 CM/SEC
BH CV ECHO MEAS - MV E/A: 0.6
BH CV ECHO MEAS - MV MAX PG: 5.8 MMHG
BH CV ECHO MEAS - MV MEAN PG: 2.4 MMHG
BH CV ECHO MEAS - MV P1/2T: 90 MSEC
BH CV ECHO MEAS - MV V2 VTI: 23.8 CM
BH CV ECHO MEAS - MVA(P1/2T): 2.4 CM2
BH CV ECHO MEAS - MVA(VTI): 1.8 CM2
BH CV ECHO MEAS - PA ACC TIME: 0.09 SEC
BH CV ECHO MEAS - PA V2 MAX: 89.1 CM/SEC
BH CV ECHO MEAS - PI END-D VEL: 97.9 CM/SEC
BH CV ECHO MEAS - RAP SYSTOLE: 3 MMHG
BH CV ECHO MEAS - RVSP: 14 MMHG
BH CV ECHO MEAS - SV(LVOT): 42.9 ML
BH CV ECHO MEAS - SVI(LVOT): 25.2 ML/M2
BH CV ECHO MEAS - TAPSE (>1.6): 1.7 CM
BH CV ECHO MEAS - TR MAX PG: 11.2 MMHG
BH CV ECHO MEAS - TR MAX VEL: 167.2 CM/SEC
BH CV ECHO MEASUREMENTS AVERAGE E/E' RATIO: 7.21
BH CV VAS BP RIGHT ARM: NORMAL MMHG
BH CV XLRA - RV BASE: 2.6 CM
BH CV XLRA - RV LENGTH: 6.1 CM
BH CV XLRA - RV MID: 3.1 CM
BH CV XLRA - TDI S': 13.6 CM/SEC
IVRT: 89 MS
LEFT ATRIUM VOLUME INDEX: 25.9 ML/M2
LV EF BIPLANE MOD: 62.8 %

## 2025-04-28 ENCOUNTER — TELEMEDICINE (OUTPATIENT)
Dept: PSYCHIATRY | Facility: CLINIC | Age: 60
End: 2025-04-28
Payer: MEDICAID

## 2025-04-28 DIAGNOSIS — F33.2 MDD (MAJOR DEPRESSIVE DISORDER), RECURRENT SEVERE, WITHOUT PSYCHOSIS: ICD-10-CM

## 2025-04-28 DIAGNOSIS — F41.1 GAD (GENERALIZED ANXIETY DISORDER): Primary | ICD-10-CM

## 2025-04-28 PROCEDURE — 90832 PSYTX W PT 30 MINUTES: CPT | Performed by: COUNSELOR

## 2025-04-28 NOTE — PATIENT INSTRUCTIONS
[place] warm Line: Phone number: [phone]      Monday through Friday 10 AM to 9 PM and Saturdays 5 PM to 9 PM.      A warmline is a NON-CRISIS number to call to get emotional support. You can call to have a conversation with someone who can provide support during hard times. Warmlines are staffed by trained peers who have been through their own mental health struggles and know what it's like to need help.      -Suicide hotline number: 988      -[facility] Resource Connection      The resource line is dedicated to providing behavioral health resources to residents of [place] and [facility], seven days a week, 7 a.m.-7 p.m.      [phone]      [email or web address]  [facility]"[employer]BehavioralHealth      Should you ever need assistance or just want to reach out to someone when your behavioral health provider is not available due to the office being closed you can contact [email or web address].       -Just text HOME to 504002 and someone will reach out to you within a few minutes.  This is a 24/7 help line and they are open even on holidays.

## 2025-04-28 NOTE — PROGRESS NOTES
"Date: April 28, 2025  Time In: 3:00  Time out: 3:33      This provider is located at the [facility]), [address], [place] [address] using a secure [employer] Video Visit through [employer]. Patient is being seen remotely via telehealth, and they are in a secure environment for this session. The patient's condition being diagnosed/treated is appropriate for telemedicine. The provider identified herself as well as her credentials. The patient, and/or patients guardian, consent to be seen remotely, and when consent is given they understand that the consent allows for patient identifiable information to be sent to a third party as needed. They may refuse to be seen remotely at any time. The electronic data is encrypted and password protected, and the patient and/or guardian has been advised of the potential risks to privacy not withstanding such measures.     Mode of Visit: Video  Location of patient: Home  Location of provider: Provider home  You have chosen to receive care through a telehealth visit.  The patient has signed the video visit consent form.  The visit included audio and video interaction. No technical issues occurred during this visit    Subjective   Shital Guzmán is a 59 y.o. female who presents today fully oriented, appropriately dressed and groomed, and open to communication for follow up appointment.    Chief Complaint:   Chief Complaint   Patient presents with    Anxiety    Depression    Alcohol Problem        History of Present Illness: Rapport was established through conversation and unconditional positive regard. Recent events were discussed and how these events impact Pt's emotional health.   Pt reports successful use of learned coping skills since last report.  Pt reports continuation of symptoms and states the intensity and duration of symptoms has worsened since last report. Pt rates anxiety at 10/10 and depression at 10/10.  \"Ten " "right on everything now with everything going on.\"       Sleep: Pt reports sleep has remained unchanged since last report. Healthy sleep habits were discussed such as maintaining a schedule, routine, avoiding caffeine, and limiting screen time before bed.    Appetite:  Pt reports appetite has been good since last report.  Discussed the importance of hydration and eating a healthy diet for overall and mental health.    Medication compliance: Pt reports medications are being taken daily as prescribed. Discussed the importance of compliance with prescriber's directions and Pt was instructed to report questions/concerns, as well as missed doses or discontinuation of medication by Pt.     Safety Plan in Place: Yes. Details: [facility] Safety Plan on file from December 2024. Reviewed and updated with Pt.   Pt denies SI/HI/SIB recent or current.  States \"I'll call 911 if I need to.\"      Daily Functioning:  Since last report, Pt states symptoms are causing Severe difficulty in daily functioning.      Content Discussion:   Pt reports life updates since previous session. Pt identified current stressors. Pt acknowledged stressors within and outside of one's control. Pt identified successes and issues with utilizing coping mechanisms.  Pt states she fell while walking to the store and she damaged her phone and that the reason Pt has missed so many medical appointments over the past two weeks.  Pt states the man Pt was staying with was deported to [place] after being picked up for a traffic violation. Pt states she is living in his apartment and has been served [employer] paperwork.  States she is going to court this week and likely will be made to leave the apartment at that time.  She is packing her things is probably going to a shelter or possibly stay with a friend. Referral to case management was offered to Pt and she declined.  States she has not been able to get to her disability medical appointments due to " "transportation issues. Pt states she continues to try and get disability social security and she has an .  Reports she no longer uses cocaine, admits she drinks some wine with neighbor and uses occasional marijuana.  States \"I don't do no pills.\"   Discussed in detail the importance of self care and prioritizing her own health and wellness. Encouraged Pt to attend medical appointments and to contact public transportation if she is not able to get a ride.  Pt states she has a food stamp card to get food and she has a place to stay for now.  States she can get a transportation card from [facility] but she only gets a limited number of trips per month.  Reviewed relaxation skills to assist with anxiety.  Reviewed coping skills to assist with depression.    Counselor supported Pt in continued exploration of underlying belief systems which contribute to difficulty in connecting/vulnerability.  Through discussion, Pt identifies themes of preservation and overt emotional and physical reactivity when physical and/or emotional statis is in question, even in low or perceived threatening situations. Counselor supported Pt in identifying past experiences and underlying beliefs which contribute to these feelings and reactions.  Pt was supported and encouraged in processing emotions related to frustrations with the expectations of self and others.  Pt was encouraged to identify cultural and nuclear familial contexts which contribute to these concerns.  Pt was receptive and engaged in conversation, and Pt reports this was beneficial and applicable to their situation.      Reviewed coping skills and encouraged Pt to continue to practicing coping skills daily when not under distress. Pt was praised for their attempts to decrease symptoms and mitigate activating events.    Clinical Maneuvering/Intervention:  CBT was utilized to encourage Pt to identify maladaptive thoughts and behaviors and replace with more affirming and " positive.Pt encouraged to set and maintain appropriate and healthy boundaries with others. Pt was instructed to practice daily using appropriate and specific words to communicate feelings to others.  Motivational interviewing used to encourage Pt to identify strengths which can be utilized in working toward treatment goals. Pt encouraged to practice daily learned skills such as controlled breathing, grounding, and mindfulness. Pt was encouraged to ask for help from support persons to assist them in maintaining stability and alleviate symptoms. Discussed the importance of being one's own mental health advocate and to refrain from seeing the need to ask for help as a weakness. Pt was encouraged to formulate a plan of action to be more proactive in managing stressors and refrain from using reactive and automatic heightened emotional responses.     Solution-focused therapy employed to identify how Pt would like their life to be if they were to make positive changes. Pt encouraged to identify effective coping skills and strengths they can use to continue utilizing those skills. Pt encouraged to discontinue utilizing non-effective coping mechanisms. Pt provided with feedback to highlight achievements and personal and other resources. Encouraged use of SMART goals    Assisted patient in processing above session content; acknowledged and normalized patient’s thoughts, feelings, and concerns.  Rationalized patient thought process regarding concerns presented at session.  Discussed triggers associated with patient's  anxiety  and depression  Also discussed coping skills for patient to implement such as grounding , 4:6 breathing , mindfulness , increasing activity , and self care     Allowed patient to freely discuss issues without interruption or judgment. Provided safe, confidential environment to facilitate the development of positive therapeutic relationship and encourage open, honest communication. Assisted patient in  "identifying risk factors which would indicate the need for higher level of care including thoughts to harm self or others and/or self-harming behavior and encouraged patient to contact this office, call 911, or present to the nearest emergency room should any of these events occur. Discussed crisis intervention services and means to access. Patient adamantly and convincingly denies current suicidal or homicidal ideation or perceptual disturbance.    Assessment:     Patient appears to maintain relative stability as compared to their baseline.  However, patient persistently struggles with symptoms which continue to cause impairment in important areas of functioning.  As a result, they can be reasonably expected to continue to benefit from treatment and would likely be at increased risk for decompensation otherwise.      PHQ-Score Total:  PHQ-9 Total Score: PHQ-9 Depression Screening  Little interest or pleasure in doing things?  3   Feeling down, depressed, or hopeless?  3   PHQ-2 Total Score     Trouble falling or staying asleep, or sleeping too much?  3   Feeling tired or having little energy?  3   Poor appetite or overeating?  3   Feeling bad about yourself - or that you are a failure or have let yourself or your family down?  3   Trouble concentrating on things, such as reading the newspaper or watching television?  3   Moving or speaking so slowly that other people could have noticed? Or the opposite - being so fidgety or restless that you have been moving around a lot more than usual?  3   Thoughts that you would be better off dead, or of hurting yourself in some way?  2   PHQ-9 Total Score  26   If you checked off any problems, how difficult have these problems made it for you to do your work, take care of things at home, or get along with other people?  Extreme       \"I'm just really depressed\"   \"I do not want to kill myself.\"    I don't need to go to the hospital.\"   \"I'm staying with my neighbor Ghislaine for " "the next two days.  I'm fine.\"      Mental Status Exam:   Hygiene:   poor  Hair messy and in her face  Cooperation:  Evasive  Eye Contact:  Poor  Psychomotor Behavior:   Pt rubbed her face throughout session, mild agitation  Affect:  Blunted  Mood: depressed and anxious  Speech:  Pressured, Rapid, and Rambling  Thought Process:  Linear  Thought Content:  Normal and Mood congruent  Suicidal:  Suicidal Ideation   Homicidal: None  Hallucinations:  None  Delusion: None  Memory:  Intact  Orientation:  Grossly Intact  Reliability:  poor  Insight:  Poor  Judgement:  Poor  Impulse Control:  Good  Physical/Medical Issues:  No      Functional Status: Severe impairment    Progress toward goal: Not at goal    Prognosis: Good with continued therapeutic intervention        Plan:    Patient will continue in individual outpatient therapy with focus on improved functioning and coping skills, maintaining stability, and avoiding decompensation and the need for higher level of care.    Patient will adhere to medication regimen as prescribed and report any side effects. Patient will contact this office, call 911 or present to the nearest emergency room should suicidal or homicidal ideations occur. Provide Cognitive Behavioral Therapy and Solution Focused Therapy to improve functioning, maintain stability, and avoid decompensation and the need for higher level of care.     Return in about 2 weeks (around 5/12/2025).      VISIT DIAGNOSIS:    Diagnosis Plan   1. MOON (generalized anxiety disorder)        2. MDD (major depressive disorder), recurrent severe, without psychosis         15:00 EDT       This document has been electronically signed by MALIK Hernandez  April 28, 2025      Part of this note may be an electronic transcription/translation of spoken language to printed text using the Dragon Dictation System.  "

## 2025-07-21 DIAGNOSIS — E11.65 TYPE 2 DIABETES MELLITUS WITH HYPERGLYCEMIA, WITHOUT LONG-TERM CURRENT USE OF INSULIN: Chronic | ICD-10-CM

## 2025-07-21 RX ORDER — EMPAGLIFLOZIN 25 MG/1
25 TABLET, FILM COATED ORAL DAILY
Qty: 30 TABLET | Refills: 0 | Status: SHIPPED | OUTPATIENT
Start: 2025-07-21

## 2025-07-21 RX ORDER — ALBUTEROL SULFATE 90 UG/1
AEROSOL, METERED RESPIRATORY (INHALATION)
Qty: 18 G | Refills: 0 | Status: SHIPPED | OUTPATIENT
Start: 2025-07-21

## 2025-07-21 RX ORDER — SEMAGLUTIDE 0.68 MG/ML
INJECTION, SOLUTION SUBCUTANEOUS
Qty: 3 ML | Refills: 0 | Status: SHIPPED | OUTPATIENT
Start: 2025-07-21

## 2025-08-06 DIAGNOSIS — E11.65 TYPE 2 DIABETES MELLITUS WITH HYPERGLYCEMIA, WITHOUT LONG-TERM CURRENT USE OF INSULIN: Chronic | ICD-10-CM

## 2025-08-06 DIAGNOSIS — I10 ACCELERATED ESSENTIAL HYPERTENSION: ICD-10-CM

## 2025-08-06 RX ORDER — AMLODIPINE BESYLATE 10 MG/1
10 TABLET ORAL DAILY
Qty: 30 TABLET | Refills: 0 | Status: SHIPPED | OUTPATIENT
Start: 2025-08-06

## 2025-08-06 RX ORDER — LOSARTAN POTASSIUM 100 MG/1
100 TABLET ORAL DAILY
Qty: 30 TABLET | Refills: 0 | Status: SHIPPED | OUTPATIENT
Start: 2025-08-06

## 2025-08-06 RX ORDER — SEMAGLUTIDE 0.68 MG/ML
INJECTION, SOLUTION SUBCUTANEOUS
Qty: 3 ML | Refills: 0 | Status: SHIPPED | OUTPATIENT
Start: 2025-08-06

## 2025-08-06 RX ORDER — ALBUTEROL SULFATE 90 UG/1
AEROSOL, METERED RESPIRATORY (INHALATION)
Qty: 18 G | Refills: 0 | Status: SHIPPED | OUTPATIENT
Start: 2025-08-06

## 2025-08-06 RX ORDER — EMPAGLIFLOZIN 25 MG/1
25 TABLET, FILM COATED ORAL DAILY
Qty: 30 TABLET | Refills: 0 | Status: SHIPPED | OUTPATIENT
Start: 2025-08-06

## 2025-08-18 ENCOUNTER — TELEPHONE (OUTPATIENT)
Dept: OBSTETRICS AND GYNECOLOGY | Facility: CLINIC | Age: 60
End: 2025-08-18
Payer: MEDICAID

## 2025-08-20 DIAGNOSIS — E78.2 MIXED HYPERLIPIDEMIA: ICD-10-CM

## 2025-08-20 RX ORDER — TERCONAZOLE 4 MG/G
1 CREAM VAGINAL NIGHTLY
Qty: 45 G | Refills: 0 | Status: SHIPPED | OUTPATIENT
Start: 2025-08-20 | End: 2025-08-27

## 2025-08-20 RX ORDER — ATORVASTATIN CALCIUM 80 MG/1
80 TABLET, FILM COATED ORAL DAILY
Qty: 90 TABLET | Refills: 3 | Status: SHIPPED | OUTPATIENT
Start: 2025-08-20